# Patient Record
Sex: MALE | Race: WHITE | URBAN - METROPOLITAN AREA
[De-identification: names, ages, dates, MRNs, and addresses within clinical notes are randomized per-mention and may not be internally consistent; named-entity substitution may affect disease eponyms.]

---

## 2018-05-24 ENCOUNTER — APPOINTMENT (RX ONLY)
Dept: URBAN - METROPOLITAN AREA CLINIC 97 | Facility: CLINIC | Age: 83
Setting detail: DERMATOLOGY
End: 2018-05-24

## 2018-05-24 DIAGNOSIS — L85.3 XEROSIS CUTIS: ICD-10-CM

## 2018-05-24 DIAGNOSIS — L81.4 OTHER MELANIN HYPERPIGMENTATION: ICD-10-CM

## 2018-05-24 DIAGNOSIS — D485 NEOPLASM OF UNCERTAIN BEHAVIOR OF SKIN: ICD-10-CM

## 2018-05-24 DIAGNOSIS — L82.1 OTHER SEBORRHEIC KERATOSIS: ICD-10-CM

## 2018-05-24 DIAGNOSIS — L57.0 ACTINIC KERATOSIS: ICD-10-CM

## 2018-05-24 PROBLEM — D48.5 NEOPLASM OF UNCERTAIN BEHAVIOR OF SKIN: Status: ACTIVE | Noted: 2018-05-24

## 2018-05-24 PROCEDURE — 11101: CPT

## 2018-05-24 PROCEDURE — ? COUNSELING

## 2018-05-24 PROCEDURE — ? BIOPSY BY SHAVE METHOD

## 2018-05-24 PROCEDURE — 17003 DESTRUCT PREMALG LES 2-14: CPT

## 2018-05-24 PROCEDURE — 11100: CPT | Mod: 59

## 2018-05-24 PROCEDURE — ? TREATMENT REGIMEN

## 2018-05-24 PROCEDURE — 99212 OFFICE O/P EST SF 10 MIN: CPT | Mod: 25

## 2018-05-24 PROCEDURE — ? LIQUID NITROGEN

## 2018-05-24 PROCEDURE — 17000 DESTRUCT PREMALG LESION: CPT

## 2018-05-24 ASSESSMENT — LOCATION DETAILED DESCRIPTION DERM
LOCATION DETAILED: RIGHT DISTAL PRETIBIAL REGION
LOCATION DETAILED: LEFT ANTERIOR PROXIMAL UPPER ARM
LOCATION DETAILED: LEFT PROXIMAL DORSAL FOREARM
LOCATION DETAILED: RIGHT PROXIMAL DORSAL FOREARM
LOCATION DETAILED: RIGHT MEDIAL FRONTAL SCALP
LOCATION DETAILED: EPIGASTRIC SKIN
LOCATION DETAILED: INFERIOR THORACIC SPINE
LOCATION DETAILED: RIGHT ANTERIOR SHOULDER
LOCATION DETAILED: RIGHT SUPERIOR MEDIAL FOREHEAD
LOCATION DETAILED: SUPERIOR THORACIC SPINE
LOCATION DETAILED: LEFT PROXIMAL PRETIBIAL REGION
LOCATION DETAILED: LEFT ANTERIOR DISTAL THIGH
LOCATION DETAILED: RIGHT SUPERIOR UPPER BACK
LOCATION DETAILED: RIGHT ANTERIOR DISTAL THIGH
LOCATION DETAILED: LEFT SUPERIOR PARIETAL SCALP
LOCATION DETAILED: LEFT SUPERIOR FOREHEAD

## 2018-05-24 ASSESSMENT — LOCATION SIMPLE DESCRIPTION DERM
LOCATION SIMPLE: LEFT UPPER ARM
LOCATION SIMPLE: RIGHT FOREHEAD
LOCATION SIMPLE: LEFT THIGH
LOCATION SIMPLE: RIGHT FOREARM
LOCATION SIMPLE: RIGHT THIGH
LOCATION SIMPLE: RIGHT SCALP
LOCATION SIMPLE: ABDOMEN
LOCATION SIMPLE: SCALP
LOCATION SIMPLE: LEFT FOREARM
LOCATION SIMPLE: RIGHT UPPER BACK
LOCATION SIMPLE: LEFT PRETIBIAL REGION
LOCATION SIMPLE: RIGHT PRETIBIAL REGION
LOCATION SIMPLE: RIGHT SHOULDER
LOCATION SIMPLE: LEFT FOREHEAD
LOCATION SIMPLE: UPPER BACK

## 2018-05-24 ASSESSMENT — LOCATION ZONE DERM
LOCATION ZONE: ARM
LOCATION ZONE: SCALP
LOCATION ZONE: FACE
LOCATION ZONE: LEG
LOCATION ZONE: TRUNK

## 2018-05-24 NOTE — PROCEDURE: BIOPSY BY SHAVE METHOD
Size Of Lesion In Cm: 0
Anesthesia Volume In Cc (Will Not Render If 0): 0.5
Destruction After The Procedure: No
Electrodesiccation Text: The wound bed was treated with electrodesiccation after the biopsy was performed.
Consent: Written consent was obtained and risks were reviewed including but not limited to scarring, infection, bleeding, scabbing, incomplete removal, nerve damage and allergy to anesthesia.
Anesthesia Type: 1% lidocaine with epinephrine
Detail Level: Simple
Hemostasis: Zee's
Lab: [facility]
Wound Care: Petrolatum
Dressing: bandage
Was A Bandage Applied: Yes
Type Of Destruction Used: Curettage
Biopsy Type: H and E
Biopsy Method: sterile single edge surgical blade
Body Location Override (Optional - Billing Will Still Be Based On Selected Body Map Location If Applicable): Mid Back
Electrodesiccation And Curettage Text: The wound bed was treated with electrodesiccation and curettage after the biopsy was performed.
Post-Care Instructions: I reviewed with the patient in detail post-care instructions. Patient is to keep the biopsy site dry overnight, and then apply bacitracin twice daily until healed. Patient may apply hydrogen peroxide soaks to remove any crusting.
Lab Facility: [id number] [facility]
Notification Instructions: Patient will be notified of biopsy results. However, patient instructed to call the office if not contacted within 2 weeks.
Silver Nitrate Text: The wound bed was treated with silver nitrate after the biopsy was performed.
Billing Type: [employer]
Billing Type: Third-Party Bill
Lab Facility: [id number]
Lab: [id number]
Body Location Override (Optional - Billing Will Still Be Based On Selected Body Map Location If Applicable): Right Shoulder

## 2018-07-13 ENCOUNTER — EMERGENCY (EMERGENCY)
Facility: HOSPITAL | Age: 83
LOS: 0 days | Discharge: HOME | End: 2018-07-13
Attending: EMERGENCY MEDICINE | Admitting: EMERGENCY MEDICINE

## 2018-07-13 VITALS
DIASTOLIC BLOOD PRESSURE: 68 MMHG | OXYGEN SATURATION: 99 % | RESPIRATION RATE: 18 BRPM | HEART RATE: 65 BPM | SYSTOLIC BLOOD PRESSURE: 171 MMHG | TEMPERATURE: 96 F

## 2018-07-13 VITALS
RESPIRATION RATE: 17 BRPM | OXYGEN SATURATION: 98 % | HEART RATE: 58 BPM | DIASTOLIC BLOOD PRESSURE: 85 MMHG | SYSTOLIC BLOOD PRESSURE: 160 MMHG

## 2018-07-13 DIAGNOSIS — R20.2 PARESTHESIA OF SKIN: ICD-10-CM

## 2018-07-13 DIAGNOSIS — E78.5 HYPERLIPIDEMIA, UNSPECIFIED: ICD-10-CM

## 2018-07-13 DIAGNOSIS — I10 ESSENTIAL (PRIMARY) HYPERTENSION: ICD-10-CM

## 2018-07-13 LAB
ALBUMIN SERPL ELPH-MCNC: 4.1 G/DL — SIGNIFICANT CHANGE UP (ref 3.5–5.2)
ALP SERPL-CCNC: 87 U/L — SIGNIFICANT CHANGE UP (ref 30–115)
ALT FLD-CCNC: 8 U/L — SIGNIFICANT CHANGE UP (ref 0–41)
ANION GAP SERPL CALC-SCNC: 11 MMOL/L — SIGNIFICANT CHANGE UP (ref 7–14)
APTT BLD: 30.1 SEC — SIGNIFICANT CHANGE UP (ref 27–39.2)
AST SERPL-CCNC: 12 U/L — SIGNIFICANT CHANGE UP (ref 0–41)
BASOPHILS # BLD AUTO: 0.09 K/UL — SIGNIFICANT CHANGE UP (ref 0–0.2)
BASOPHILS NFR BLD AUTO: 1 % — SIGNIFICANT CHANGE UP (ref 0–1)
BILIRUB SERPL-MCNC: 0.5 MG/DL — SIGNIFICANT CHANGE UP (ref 0.2–1.2)
BUN SERPL-MCNC: 15 MG/DL — SIGNIFICANT CHANGE UP (ref 10–20)
CALCIUM SERPL-MCNC: 9 MG/DL — SIGNIFICANT CHANGE UP (ref 8.5–10.1)
CHLORIDE SERPL-SCNC: 102 MMOL/L — SIGNIFICANT CHANGE UP (ref 98–110)
CK MB CFR SERPL CALC: 2.1 NG/ML — SIGNIFICANT CHANGE UP (ref 0.6–6.3)
CK SERPL-CCNC: 76 U/L — SIGNIFICANT CHANGE UP (ref 0–225)
CO2 SERPL-SCNC: 28 MMOL/L — SIGNIFICANT CHANGE UP (ref 17–32)
CREAT SERPL-MCNC: 0.8 MG/DL — SIGNIFICANT CHANGE UP (ref 0.7–1.5)
EOSINOPHIL # BLD AUTO: 0.47 K/UL — SIGNIFICANT CHANGE UP (ref 0–0.7)
EOSINOPHIL NFR BLD AUTO: 5.4 % — SIGNIFICANT CHANGE UP (ref 0–8)
GLUCOSE SERPL-MCNC: 93 MG/DL — SIGNIFICANT CHANGE UP (ref 70–99)
HCT VFR BLD CALC: 40 % — LOW (ref 42–52)
HGB BLD-MCNC: 13.6 G/DL — LOW (ref 14–18)
IMM GRANULOCYTES NFR BLD AUTO: 0.2 % — SIGNIFICANT CHANGE UP (ref 0.1–0.3)
INR BLD: 1.1 RATIO — SIGNIFICANT CHANGE UP (ref 0.65–1.3)
LYMPHOCYTES # BLD AUTO: 1.76 K/UL — SIGNIFICANT CHANGE UP (ref 1.2–3.4)
LYMPHOCYTES # BLD AUTO: 20.2 % — LOW (ref 20.5–51.1)
MCHC RBC-ENTMCNC: 30.2 PG — SIGNIFICANT CHANGE UP (ref 27–31)
MCHC RBC-ENTMCNC: 34 G/DL — SIGNIFICANT CHANGE UP (ref 32–37)
MCV RBC AUTO: 88.7 FL — SIGNIFICANT CHANGE UP (ref 80–94)
MONOCYTES # BLD AUTO: 1.05 K/UL — HIGH (ref 0.1–0.6)
MONOCYTES NFR BLD AUTO: 12.1 % — HIGH (ref 1.7–9.3)
NEUTROPHILS # BLD AUTO: 5.31 K/UL — SIGNIFICANT CHANGE UP (ref 1.4–6.5)
NEUTROPHILS NFR BLD AUTO: 61.1 % — SIGNIFICANT CHANGE UP (ref 42.2–75.2)
PLATELET # BLD AUTO: 207 K/UL — SIGNIFICANT CHANGE UP (ref 130–400)
POTASSIUM SERPL-MCNC: 4.3 MMOL/L — SIGNIFICANT CHANGE UP (ref 3.5–5)
POTASSIUM SERPL-SCNC: 4.3 MMOL/L — SIGNIFICANT CHANGE UP (ref 3.5–5)
PROT SERPL-MCNC: 6.7 G/DL — SIGNIFICANT CHANGE UP (ref 6–8)
PROTHROM AB SERPL-ACNC: 11.9 SEC — SIGNIFICANT CHANGE UP (ref 9.95–12.87)
RBC # BLD: 4.51 M/UL — LOW (ref 4.7–6.1)
RBC # FLD: 14.4 % — SIGNIFICANT CHANGE UP (ref 11.5–14.5)
SODIUM SERPL-SCNC: 141 MMOL/L — SIGNIFICANT CHANGE UP (ref 135–146)
TROPONIN T SERPL-MCNC: <0.01 NG/ML — SIGNIFICANT CHANGE UP
WBC # BLD: 8.7 K/UL — SIGNIFICANT CHANGE UP (ref 4.8–10.8)
WBC # FLD AUTO: 8.7 K/UL — SIGNIFICANT CHANGE UP (ref 4.8–10.8)

## 2018-07-13 NOTE — ED PROVIDER NOTE - PROGRESS NOTE DETAILS
ATTENDING NOTE:   84 y/o M had outpatient XR showing  questionable fracture of cervical spine so sent to ED for CT scan. Here pt notes +associated tingling sensation of LUE. Denies trauma.  No HA, neck pain, CP, SOB, abdominal pain, rash.   AVSS; exam as noted. (+)TTP to cervical spine. L arm tingling reproduced with axial load. CTAB. RRR. Abdomen soft NTND, (+) bowel sounds. Neuro nonfocal. Labs, imaging reviewed. Will discharge.

## 2018-07-13 NOTE — ED ADULT TRIAGE NOTE - CHIEF COMPLAINT QUOTE
Pt went to urgent care for eval of left arm tingling x 1 month, was found to have C-spine FX, referral for ortho Pt went to urgent care for eval of left arm tingling x 1 month, was found to have C-spine FX, referral for ortho & sent for "abnormal EKG" pt denies CP

## 2018-07-13 NOTE — ED ADULT NURSE NOTE - CHIEF COMPLAINT QUOTE
Pt went to urgent care for eval of left arm tingling x 1 month, was found to have C-spine FX, referral for ortho & sent for "abnormal EKG" pt denies CP

## 2018-07-13 NOTE — ED PROVIDER NOTE - NS ED ROS FT
Constitutional: No fever, chills.  Eyes: No visual changes.  ENT: No hearing changes. No sore throat.  Neck: No neck pain or stiffness.  Cardiovascular: No chest pain, palpitations, edema.  Pulmonary: No SOB, cough. No hemoptysis.  Abdominal: No abdominal pain, nausea, vomiting, diarrhea.  : No dysuria, frequency.  Neuro: No headache, syncope, dizziness. + tingling to left forearm.  MS: No back pain. No calf pain/swelling.  Psych: No suicidal ideations.

## 2018-07-13 NOTE — ED PROVIDER NOTE - OBJECTIVE STATEMENT
Pt is an 82 y/o male with hx of HTN, DLD, who presents to ED for tingling to left forearm. PT states has been having intermittent tingling for 4 weeks, non exertional. No chest pain, SOB, diaphoresis, n/v. PT was seen at urgent care last night, had EKg which was abnormal and Cspine XR which showed possible fx so was sent here for eval. No neck pain. Pt had negative stress test last year.

## 2018-07-13 NOTE — ED ADULT NURSE NOTE - NS PRO PASSIVE SMOKE EXP
Called the number provided pt not available left msg with Latisha Anna/Wife to have pt call Dr Ramirez's office. Voiced understanding   No

## 2018-07-13 NOTE — ED PROVIDER NOTE - PHYSICAL EXAMINATION
Constitutional: Well developed, well nourished. NAD.  Head: Normocephalic, atraumatic.  Eyes: PERRL. EOMI.  ENT: No nasal discharge. Mucous membranes moist.  Neck: Supple. Painless ROM. No midline tenderness. + Spurlings.  Cardiovascular: Normal S1, S2. Regular rate and rhythm. No murmurs, rubs, or gallops.  Pulmonary: Normal respiratory rate and effort. Lungs clear to auscultation bilaterally. No wheezing, rales, or rhonchi.  Abdominal: Soft. Nondistended. Nontender. No rebound, guarding, rigidity.  Extremities. Pelvis stable. No lower extremity edema, symmetric calves.  Skin: No rashes, cyanosis.  Neuro: AAOx3. No focal neurological deficits.  Psych: Normal mood. Normal affect.

## 2018-10-23 PROBLEM — E78.00 PURE HYPERCHOLESTEROLEMIA, UNSPECIFIED: Chronic | Status: ACTIVE | Noted: 2018-07-13

## 2018-10-23 PROBLEM — I10 ESSENTIAL (PRIMARY) HYPERTENSION: Chronic | Status: ACTIVE | Noted: 2018-07-13

## 2018-10-29 ENCOUNTER — OUTPATIENT (OUTPATIENT)
Dept: OUTPATIENT SERVICES | Facility: HOSPITAL | Age: 83
LOS: 1 days | Discharge: HOME | End: 2018-10-29

## 2018-10-29 DIAGNOSIS — R25.1 TREMOR, UNSPECIFIED: ICD-10-CM

## 2018-10-29 DIAGNOSIS — R41.3 OTHER AMNESIA: ICD-10-CM

## 2018-11-02 ENCOUNTER — OUTPATIENT (OUTPATIENT)
Dept: OUTPATIENT SERVICES | Facility: HOSPITAL | Age: 83
LOS: 1 days | Discharge: HOME | End: 2018-11-02

## 2018-11-02 DIAGNOSIS — R25.1 TREMOR, UNSPECIFIED: ICD-10-CM

## 2018-11-02 DIAGNOSIS — G20 PARKINSON'S DISEASE: ICD-10-CM

## 2018-11-19 ENCOUNTER — EMERGENCY (EMERGENCY)
Facility: HOSPITAL | Age: 83
LOS: 0 days | Discharge: HOME | End: 2018-11-19
Attending: EMERGENCY MEDICINE | Admitting: EMERGENCY MEDICINE

## 2018-11-19 VITALS
TEMPERATURE: 98 F | SYSTOLIC BLOOD PRESSURE: 158 MMHG | DIASTOLIC BLOOD PRESSURE: 70 MMHG | OXYGEN SATURATION: 99 % | RESPIRATION RATE: 18 BRPM | HEART RATE: 71 BPM

## 2018-11-19 VITALS
RESPIRATION RATE: 18 BRPM | DIASTOLIC BLOOD PRESSURE: 80 MMHG | SYSTOLIC BLOOD PRESSURE: 145 MMHG | OXYGEN SATURATION: 100 % | HEART RATE: 71 BPM | TEMPERATURE: 98 F

## 2018-11-19 DIAGNOSIS — R11.0 NAUSEA: ICD-10-CM

## 2018-11-19 DIAGNOSIS — R06.2 WHEEZING: ICD-10-CM

## 2018-11-19 DIAGNOSIS — R05 COUGH: ICD-10-CM

## 2018-11-19 DIAGNOSIS — R07.89 OTHER CHEST PAIN: ICD-10-CM

## 2018-11-19 DIAGNOSIS — R09.81 NASAL CONGESTION: ICD-10-CM

## 2018-11-19 DIAGNOSIS — R06.02 SHORTNESS OF BREATH: ICD-10-CM

## 2018-11-19 LAB
ALBUMIN SERPL ELPH-MCNC: 4.5 G/DL — SIGNIFICANT CHANGE UP (ref 3.5–5.2)
ALP SERPL-CCNC: 86 U/L — SIGNIFICANT CHANGE UP (ref 30–115)
ALT FLD-CCNC: 15 U/L — SIGNIFICANT CHANGE UP (ref 0–41)
ANION GAP SERPL CALC-SCNC: 16 MMOL/L — HIGH (ref 7–14)
AST SERPL-CCNC: 14 U/L — SIGNIFICANT CHANGE UP (ref 0–41)
BILIRUB SERPL-MCNC: 0.5 MG/DL — SIGNIFICANT CHANGE UP (ref 0.2–1.2)
BUN SERPL-MCNC: 25 MG/DL — HIGH (ref 10–20)
CALCIUM SERPL-MCNC: 9.7 MG/DL — SIGNIFICANT CHANGE UP (ref 8.5–10.1)
CHLORIDE SERPL-SCNC: 101 MMOL/L — SIGNIFICANT CHANGE UP (ref 98–110)
CO2 SERPL-SCNC: 26 MMOL/L — SIGNIFICANT CHANGE UP (ref 17–32)
CREAT SERPL-MCNC: 0.9 MG/DL — SIGNIFICANT CHANGE UP (ref 0.7–1.5)
GAS PNL BLDV: SIGNIFICANT CHANGE UP
GLUCOSE SERPL-MCNC: 117 MG/DL — HIGH (ref 70–99)
HCT VFR BLD CALC: 41.9 % — LOW (ref 42–52)
HGB BLD-MCNC: 14 G/DL — SIGNIFICANT CHANGE UP (ref 14–18)
LIDOCAIN IGE QN: 21 U/L — SIGNIFICANT CHANGE UP (ref 7–60)
MAGNESIUM SERPL-MCNC: 1.8 MG/DL — SIGNIFICANT CHANGE UP (ref 1.8–2.4)
MCHC RBC-ENTMCNC: 30.4 PG — SIGNIFICANT CHANGE UP (ref 27–31)
MCHC RBC-ENTMCNC: 33.4 G/DL — SIGNIFICANT CHANGE UP (ref 32–37)
MCV RBC AUTO: 90.9 FL — SIGNIFICANT CHANGE UP (ref 80–94)
NRBC # BLD: 0 /100 WBCS — SIGNIFICANT CHANGE UP (ref 0–0)
NT-PROBNP SERPL-SCNC: 135 PG/ML — SIGNIFICANT CHANGE UP (ref 0–300)
PLATELET # BLD AUTO: 270 K/UL — SIGNIFICANT CHANGE UP (ref 130–400)
POTASSIUM SERPL-MCNC: 4.8 MMOL/L — SIGNIFICANT CHANGE UP (ref 3.5–5)
POTASSIUM SERPL-SCNC: 4.8 MMOL/L — SIGNIFICANT CHANGE UP (ref 3.5–5)
PROT SERPL-MCNC: 7.1 G/DL — SIGNIFICANT CHANGE UP (ref 6–8)
RBC # BLD: 4.61 M/UL — LOW (ref 4.7–6.1)
RBC # FLD: 14.4 % — SIGNIFICANT CHANGE UP (ref 11.5–14.5)
SODIUM SERPL-SCNC: 143 MMOL/L — SIGNIFICANT CHANGE UP (ref 135–146)
TROPONIN T SERPL-MCNC: <0.01 NG/ML — SIGNIFICANT CHANGE UP
WBC # BLD: 15.75 K/UL — HIGH (ref 4.8–10.8)
WBC # FLD AUTO: 15.75 K/UL — HIGH (ref 4.8–10.8)

## 2018-11-19 NOTE — ED PROVIDER NOTE - NS_ ATTENDINGSCRIBEDETAILS _ED_A_ED_FT
I personally performed the services described in the documentation  recorded by the scribe in my presence, and it accurately and completely records my words and action

## 2018-11-19 NOTE — ED PROVIDER NOTE - OBJECTIVE STATEMENT
82 y/o M pmh HTN, HLD, parkinson's p/w burning, non radiating, mid chest pain that started last night, associated w/ SOB and nausea. Pt states that he has had cough, wheezing and congestion for past two weeks, recently saw Dr Huber and was diagnosed w/ URI, given prednisone with improvement in cough. Pt denies calf pain, admits to some b/l LE edema.

## 2018-11-19 NOTE — ED PROVIDER NOTE - ATTENDING CONTRIBUTION TO CARE
82 y/o M with no significant PMHx here c/o burning sensation in esophagus, occurring last night while pt was asleep into this AM. Pt states the burning sensation is intermittent. and feels like it is going up and down his throat. Pt denies SOB, abdominal pain, or neck pain. He was recently tx with prednisone for bronchitis for the past 2 weeks. he has no exertional symptoms.  PE: heart RRR, lungs CTAB, abdomen soft, NTND, FROM all ext. IMP: likely GI related given burning occurring at night without exertional SX. SX are intermittent and pt had normal stress test x8 months ago. Plan: one set of enzymes, EKG and treat for GI related acid reflux.

## 2018-11-19 NOTE — ED PROVIDER NOTE - MEDICAL DECISION MAKING DETAILS
evaluated for cough and burning chest pain. given his sytmposm appears to be more gi related and unliekly cardiac. we did perform 1 set of enyzmes hwoever seamus refused to wait for full resutls. I discussed that these results are essential to evaluate his sytmpoms and without it I can not make a determination of his sytmpoms. he undrstands this limitations and he will leave ama. I attempted to call lab to speed results but it still did not return in time, he is of sound mind, aox3 and able to comprehend his decisions of leaving without all the blood results.

## 2018-11-19 NOTE — ED ADULT NURSE NOTE - NSIMPLEMENTINTERV_GEN_ALL_ED
Implemented All Universal Safety Interventions:  Lee to call system. Call bell, personal items and telephone within reach. Instruct patient to call for assistance. Room bathroom lighting operational. Non-slip footwear when patient is off stretcher. Physically safe environment: no spills, clutter or unnecessary equipment. Stretcher in lowest position, wheels locked, appropriate side rails in place.

## 2018-11-19 NOTE — ED PROVIDER NOTE - PHYSICAL EXAMINATION
General: AOx4, Non toxic appearing, NAD, speaking in full sentences.   Skin: Warm and dry, no acute rash.   Head:  Normocephalic, atraumatic.   EENT: PERRLA/EOMI, conjunctiva and sclera clear. MM moist, no nasal discharge.  Pharynx and TM's unremarkable.  No mastoid or temporal ttp.   Neck: Supple nt, no meningeal signs.   Heart RRR s1s2 nl, no rub/murmur.   Lungs- No retractions, BS equal, CTAB.   Abdomen: Soft ntnd no r/g.   Extremities- moves all, +equal distal pulses, brisk cap refill. +1 LE edema, calves nttp b/l.  Neuro: Sensation wnl, CN 2-12 grossly intact. +5/5 muscle strength throughout.  Psych: Cooperative, appropriate

## 2018-11-19 NOTE — ED PROVIDER NOTE - NS ED ROS FT
Constitutional: NAD  Eyes: No visual changes, eye pain or discharge.  ENMT:+URI sx. No neck pain or stiffness.  Cardiac: +chest pain, SOB, edema. No chest pain with exertion.  Respiratory: +cough, SOB  GI: + nausea. No vomiting, diarrhea or abdominal pain.  : No dysuria, frequency or burning.  MS: No myalgia, muscle weakness, joint pain or back pain.  Neuro: No headache or weakness. No LOC.  Skin: No skin rash.  Heme: No bruising

## 2018-11-19 NOTE — ED ADULT NURSE NOTE - CHPI ED NUR SYMPTOMS NEG
no chills/no decreased eating/drinking/no fever/no nausea/no pain/no vomiting/no dizziness/no tingling/no weakness

## 2019-03-14 ENCOUNTER — APPOINTMENT (OUTPATIENT)
Dept: SURGERY | Facility: CLINIC | Age: 84
End: 2019-03-14
Payer: MEDICARE

## 2019-03-14 VITALS
BODY MASS INDEX: 23.46 KG/M2 | HEIGHT: 66 IN | WEIGHT: 146 LBS | DIASTOLIC BLOOD PRESSURE: 78 MMHG | SYSTOLIC BLOOD PRESSURE: 118 MMHG

## 2019-03-14 DIAGNOSIS — R15.9 FULL INCONTINENCE OF FECES: ICD-10-CM

## 2019-03-14 DIAGNOSIS — F17.200 NICOTINE DEPENDENCE, UNSPECIFIED, UNCOMPLICATED: ICD-10-CM

## 2019-03-14 DIAGNOSIS — Z78.9 OTHER SPECIFIED HEALTH STATUS: ICD-10-CM

## 2019-03-14 PROCEDURE — 99203 OFFICE O/P NEW LOW 30 MIN: CPT | Mod: 25

## 2019-03-14 PROCEDURE — 46600 DIAGNOSTIC ANOSCOPY SPX: CPT

## 2019-03-14 NOTE — HISTORY OF PRESENT ILLNESS
[FreeTextEntry1] : This is a new patient visit for Mr. POPE who is referred by Dr. Arechiga with anal incontinence. Mr. Pope states that he had a hemorrhoidectomy approximately a 10 years ago and since that time he's had difficulty controlling his bowel movements. He states that he has occasional accidents. He has not noted that this is become now worse over time but that it has stayed constant.

## 2019-03-14 NOTE — PROCEDURE
[FreeTextEntry1] : Anoscopy performed using a disposable anoscope.  The patient was place in the left lateral decubitus position. After LIGIA was performed the anoscope was inserted and the distal rectum was evaluated along with the anal canal and anal margin.\par \par Findings:\par Perianal tissues are without significant erythema induration, mild fecal soiling. moderate perianal skin tags.\par \par LIGIA, reasonable tone, reasonable squeeze, no palpable mass.\par \par Anoscopy, grade 2-3 internal hemorrhoids.

## 2019-03-14 NOTE — ASSESSMENT
[FreeTextEntry1] : Mr. Pinon has complained of anal incontinence with fecal soilage. We will get him scheduled for anorectal manometry. The procedure was described to him in detail and informed consent was obtained. The procedure will be scheduled for him at his earliest convenience.

## 2019-03-14 NOTE — PHYSICAL EXAM
[Excoriation] : excoriations [Reduce Spontaneously] : a spontaneously reducible (grade II) [Skin Tags] : residual hemorrhoidal skin tags were noted [Normal] : was normal [None] : there was no rectal mass  [Alert] : alert [Oriented to Person] : oriented to person [Oriented to Place] : oriented to place [Oriented to Time] : oriented to time [Calm] : calm [Multiple Sinus Tracts] : no perianal sinus tracts [Fistula] : no fistulas [Wart] : no warts [Ulcer ___ cm] : no ulcers [Pilonidal Cyst] : no pilonidal cysts [Pilonidal Sinus] : no pilonidal sinus [Pilonidal Sinus Draining] : no pilonidal sinus drainage [de-identified] : Healthy male, NAD [de-identified] : Full range of motion [de-identified] : No focal deficits.

## 2019-03-14 NOTE — CONSULT LETTER
[Dear  ___] : Dear  [unfilled], [Consult Letter:] : I had the pleasure of evaluating your patient, [unfilled]. [Please see my note below.] : Please see my note below. [Consult Closing:] : Thank you very much for allowing me to participate in the care of this patient.  If you have any questions, please do not hesitate to contact me. [Sincerely,] : Sincerely, [FreeTextEntry1] : Mr. Pinon has complained of anal incontinence with fecal soilage. We will get him scheduled for anorectal manometry. The procedure was described to him in detail and informed consent was obtained. The procedure will be scheduled for him at his earliest convenience. [FreeTextEntry3] : Steve Bland MD.\par Colon and Rectal Surgery\par [facility]\par [facility]\par [facility], 3rd floor\par [address] [place] [address]\par [place], [place], [address]\par [phone]\par \par \par

## 2019-03-14 NOTE — PHYSICAL EXAM
[Excoriation] : excoriations [Reduce Spontaneously] : a spontaneously reducible (grade II) [Skin Tags] : residual hemorrhoidal skin tags were noted [Normal] : was normal [None] : there was no rectal mass  [Alert] : alert [Oriented to Person] : oriented to person [Oriented to Place] : oriented to place [Oriented to Time] : oriented to time [Calm] : calm [Multiple Sinus Tracts] : no perianal sinus tracts [Fistula] : no fistulas [Wart] : no warts [Ulcer ___ cm] : no ulcers [Pilonidal Cyst] : no pilonidal cysts [Pilonidal Sinus] : no pilonidal sinus [Pilonidal Sinus Draining] : no pilonidal sinus drainage [de-identified] : Healthy male, NAD [de-identified] : Full range of motion [de-identified] : No focal deficits.

## 2019-03-14 NOTE — CONSULT LETTER
[Dear  ___] : Dear  [unfilled], [Consult Letter:] : I had the pleasure of evaluating your patient, [unfilled]. [Please see my note below.] : Please see my note below. [Consult Closing:] : Thank you very much for allowing me to participate in the care of this patient.  If you have any questions, please do not hesitate to contact me. [Sincerely,] : Sincerely, [FreeTextEntry1] : Mr. Pinon has complained of anal incontinence with fecal soilage. We will get him scheduled for anorectal manometry. The procedure was described to him in detail and informed consent was obtained. The procedure will be scheduled for him at his earliest convenience. [FreeTextEntry3] : Steve Bland MD.\par Colon and Rectal Surgery\par [facility]\par [facility]\par [facility], 3rd floor\par [address]\par [place], [place], [address]\par [phone]\par \par \par

## 2019-03-19 ENCOUNTER — MESSAGE (OUTPATIENT)
Age: 84
End: 2019-03-19

## 2019-03-22 ENCOUNTER — MESSAGE (OUTPATIENT)
Age: 84
End: 2019-03-22

## 2019-05-13 ENCOUNTER — INPATIENT (INPATIENT)
Facility: HOSPITAL | Age: 84
LOS: 1 days | Discharge: NURSING, REHAB OR LONG-TERM CARE | End: 2019-05-15
Attending: INTERNAL MEDICINE | Admitting: INTERNAL MEDICINE
Payer: MEDICARE

## 2019-05-13 VITALS
RESPIRATION RATE: 17 BRPM | OXYGEN SATURATION: 99 % | DIASTOLIC BLOOD PRESSURE: 88 MMHG | SYSTOLIC BLOOD PRESSURE: 203 MMHG | HEART RATE: 75 BPM | TEMPERATURE: 97 F

## 2019-05-13 LAB
ALBUMIN SERPL ELPH-MCNC: 4.3 G/DL — SIGNIFICANT CHANGE UP (ref 3.5–5.2)
ALP SERPL-CCNC: 102 U/L — SIGNIFICANT CHANGE UP (ref 30–115)
ALT FLD-CCNC: 24 U/L — SIGNIFICANT CHANGE UP (ref 0–41)
ANION GAP SERPL CALC-SCNC: 13 MMOL/L — SIGNIFICANT CHANGE UP (ref 7–14)
AST SERPL-CCNC: 14 U/L — SIGNIFICANT CHANGE UP (ref 0–41)
BASOPHILS # BLD AUTO: 0.1 K/UL — SIGNIFICANT CHANGE UP (ref 0–0.2)
BASOPHILS NFR BLD AUTO: 0.8 % — SIGNIFICANT CHANGE UP (ref 0–1)
BILIRUB SERPL-MCNC: 0.3 MG/DL — SIGNIFICANT CHANGE UP (ref 0.2–1.2)
BUN SERPL-MCNC: 22 MG/DL — HIGH (ref 10–20)
CALCIUM SERPL-MCNC: 9.8 MG/DL — SIGNIFICANT CHANGE UP (ref 8.5–10.1)
CHLORIDE SERPL-SCNC: 107 MMOL/L — SIGNIFICANT CHANGE UP (ref 98–110)
CO2 SERPL-SCNC: 27 MMOL/L — SIGNIFICANT CHANGE UP (ref 17–32)
CREAT SERPL-MCNC: 1 MG/DL — SIGNIFICANT CHANGE UP (ref 0.7–1.5)
EOSINOPHIL # BLD AUTO: 0.53 K/UL — SIGNIFICANT CHANGE UP (ref 0–0.7)
EOSINOPHIL NFR BLD AUTO: 4.5 % — SIGNIFICANT CHANGE UP (ref 0–8)
GLUCOSE SERPL-MCNC: 85 MG/DL — SIGNIFICANT CHANGE UP (ref 70–99)
HCT VFR BLD CALC: 45.4 % — SIGNIFICANT CHANGE UP (ref 42–52)
HGB BLD-MCNC: 15 G/DL — SIGNIFICANT CHANGE UP (ref 14–18)
IMM GRANULOCYTES NFR BLD AUTO: 0.7 % — HIGH (ref 0.1–0.3)
LYMPHOCYTES # BLD AUTO: 1.46 K/UL — SIGNIFICANT CHANGE UP (ref 1.2–3.4)
LYMPHOCYTES # BLD AUTO: 12.3 % — LOW (ref 20.5–51.1)
MCHC RBC-ENTMCNC: 30.2 PG — SIGNIFICANT CHANGE UP (ref 27–31)
MCHC RBC-ENTMCNC: 33 G/DL — SIGNIFICANT CHANGE UP (ref 32–37)
MCV RBC AUTO: 91.5 FL — SIGNIFICANT CHANGE UP (ref 80–94)
MONOCYTES # BLD AUTO: 1.08 K/UL — HIGH (ref 0.1–0.6)
MONOCYTES NFR BLD AUTO: 9.1 % — SIGNIFICANT CHANGE UP (ref 1.7–9.3)
NEUTROPHILS # BLD AUTO: 8.65 K/UL — HIGH (ref 1.4–6.5)
NEUTROPHILS NFR BLD AUTO: 72.6 % — SIGNIFICANT CHANGE UP (ref 42.2–75.2)
NRBC # BLD: 0 /100 WBCS — SIGNIFICANT CHANGE UP (ref 0–0)
PLATELET # BLD AUTO: 241 K/UL — SIGNIFICANT CHANGE UP (ref 130–400)
POTASSIUM SERPL-MCNC: 4.5 MMOL/L — SIGNIFICANT CHANGE UP (ref 3.5–5)
POTASSIUM SERPL-SCNC: 4.5 MMOL/L — SIGNIFICANT CHANGE UP (ref 3.5–5)
PROT SERPL-MCNC: 6.7 G/DL — SIGNIFICANT CHANGE UP (ref 6–8)
RBC # BLD: 4.96 M/UL — SIGNIFICANT CHANGE UP (ref 4.7–6.1)
RBC # FLD: 13.9 % — SIGNIFICANT CHANGE UP (ref 11.5–14.5)
SODIUM SERPL-SCNC: 147 MMOL/L — HIGH (ref 135–146)
WBC # BLD: 11.9 K/UL — HIGH (ref 4.8–10.8)
WBC # FLD AUTO: 11.9 K/UL — HIGH (ref 4.8–10.8)

## 2019-05-13 PROCEDURE — 99285 EMERGENCY DEPT VISIT HI MDM: CPT | Mod: GC

## 2019-05-13 PROCEDURE — 73552 X-RAY EXAM OF FEMUR 2/>: CPT | Mod: 26,LT

## 2019-05-13 PROCEDURE — 73110 X-RAY EXAM OF WRIST: CPT | Mod: 26,LT

## 2019-05-13 PROCEDURE — 71045 X-RAY EXAM CHEST 1 VIEW: CPT | Mod: 26

## 2019-05-13 PROCEDURE — 73562 X-RAY EXAM OF KNEE 3: CPT | Mod: 26,LT

## 2019-05-13 PROCEDURE — 72192 CT PELVIS W/O DYE: CPT | Mod: 26

## 2019-05-13 PROCEDURE — 72170 X-RAY EXAM OF PELVIS: CPT | Mod: 26

## 2019-05-13 RX ORDER — LABETALOL HCL 100 MG
10 TABLET ORAL ONCE
Refills: 0 | Status: DISCONTINUED | OUTPATIENT
Start: 2019-05-13 | End: 2019-05-15

## 2019-05-13 RX ORDER — LISINOPRIL 2.5 MG/1
1 TABLET ORAL
Qty: 0 | Refills: 0 | DISCHARGE

## 2019-05-13 RX ORDER — ACETAMINOPHEN 500 MG
650 TABLET ORAL ONCE
Refills: 0 | Status: COMPLETED | OUTPATIENT
Start: 2019-05-13 | End: 2019-05-13

## 2019-05-13 RX ORDER — MORPHINE SULFATE 50 MG/1
2 CAPSULE, EXTENDED RELEASE ORAL EVERY 4 HOURS
Refills: 0 | Status: DISCONTINUED | OUTPATIENT
Start: 2019-05-13 | End: 2019-05-15

## 2019-05-13 RX ORDER — IBUPROFEN 200 MG
400 TABLET ORAL EVERY 6 HOURS
Refills: 0 | Status: DISCONTINUED | OUTPATIENT
Start: 2019-05-13 | End: 2019-05-15

## 2019-05-13 RX ORDER — SODIUM CHLORIDE 9 MG/ML
1000 INJECTION INTRAMUSCULAR; INTRAVENOUS; SUBCUTANEOUS
Refills: 0 | Status: DISCONTINUED | OUTPATIENT
Start: 2019-05-13 | End: 2019-05-14

## 2019-05-13 RX ADMIN — Medication 650 MILLIGRAM(S): at 19:24

## 2019-05-13 RX ADMIN — Medication 650 MILLIGRAM(S): at 17:06

## 2019-05-13 NOTE — ED ADULT NURSE REASSESSMENT NOTE - NS ED NURSE REASSESS COMMENT FT1
Report given to oncoming RN Taylor, pts history, current condition and reason for admission discussed, safety concerns addressed and reviewed, pt currently in stable condition, IV flushes for patency and site shows no signs or symptoms of infiltrate, dressing is clean dry and intact, pt is aware of plan of care. Pt education deemed successful at time of report after patient demonstrates successful teach back for proficiency.
Dr. Ordoñez made aware of pts BP and pain

## 2019-05-13 NOTE — ED PROVIDER NOTE - OBJECTIVE STATEMENT
84y/oi M w/ hx of htn and high cholesterol presents after mechanical fall to l. hip, no trauma to head no loc.  no cp or sob before or after fall.  pt was able to walk a few steps after fall, but then had to stop up because could bare weight to l.

## 2019-05-13 NOTE — ED PROVIDER NOTE - PHYSICAL EXAMINATION
VITAL SIGNS: I have reviewed nursing notes and confirm.  CONSTITUTIONAL: Well-developed; well-nourished; in no acute distress. pt comfortable.  SKIN: skin exam is warm and dry, no acute rash.   HEAD: Normocephalic; atraumatic.  EYES:  EOM intact; conjunctiva and sclera clear.  ENT: No nasal discharge; airway clear. moist oral mucosa;  NECK: Supple; non tender.  CARD: S1, S2 normal; no murmurs, gallops, or rubs. Regular rate and rhythm. posterior tibial and radial pulses 2+  RESP: No wheezes, rales or rhonchi. cta b/l. no use of accessory muscles. no retractions  ABD: Normal bowel sounds; soft; non-distended; non-tender; no rebound.   EXT: Normal ROM. No  cyanosis or edema.  BACK: No cva tenderness  LYMPH: No acute cervical adenopathy.  NEURO: Alert, oriented, grossly unremarkable.  +limping.  PSYCH: Cooperative, appropriate.

## 2019-05-13 NOTE — ED ADULT NURSE NOTE - OBJECTIVE STATEMENT
Pt is an 84YOM who is here after suffering from a fall, pt was ambulating at home on the street when he kicked an piece of the ground that was not level and fell down, ladning on his left hip and arm. Pt denies hitting his head, pt denies any LOC, pt denies any blood thinners but states he takes an 81mg ASA.

## 2019-05-14 LAB
ALBUMIN SERPL ELPH-MCNC: 3.9 G/DL — SIGNIFICANT CHANGE UP (ref 3.5–5.2)
ALP SERPL-CCNC: 85 U/L — SIGNIFICANT CHANGE UP (ref 30–115)
ALT FLD-CCNC: 16 U/L — SIGNIFICANT CHANGE UP (ref 0–41)
ANION GAP SERPL CALC-SCNC: 12 MMOL/L — SIGNIFICANT CHANGE UP (ref 7–14)
AST SERPL-CCNC: 10 U/L — SIGNIFICANT CHANGE UP (ref 0–41)
BASOPHILS # BLD AUTO: 0.1 K/UL — SIGNIFICANT CHANGE UP (ref 0–0.2)
BASOPHILS NFR BLD AUTO: 1.1 % — HIGH (ref 0–1)
BILIRUB SERPL-MCNC: 0.3 MG/DL — SIGNIFICANT CHANGE UP (ref 0.2–1.2)
BLD GP AB SCN SERPL QL: SIGNIFICANT CHANGE UP
BUN SERPL-MCNC: 15 MG/DL — SIGNIFICANT CHANGE UP (ref 10–20)
CALCIUM SERPL-MCNC: 9 MG/DL — SIGNIFICANT CHANGE UP (ref 8.5–10.1)
CHLORIDE SERPL-SCNC: 106 MMOL/L — SIGNIFICANT CHANGE UP (ref 98–110)
CK SERPL-CCNC: 41 U/L — SIGNIFICANT CHANGE UP (ref 0–225)
CO2 SERPL-SCNC: 25 MMOL/L — SIGNIFICANT CHANGE UP (ref 17–32)
CREAT SERPL-MCNC: 0.8 MG/DL — SIGNIFICANT CHANGE UP (ref 0.7–1.5)
EOSINOPHIL # BLD AUTO: 0.49 K/UL — SIGNIFICANT CHANGE UP (ref 0–0.7)
EOSINOPHIL NFR BLD AUTO: 5.5 % — SIGNIFICANT CHANGE UP (ref 0–8)
GLUCOSE SERPL-MCNC: 89 MG/DL — SIGNIFICANT CHANGE UP (ref 70–99)
HCT VFR BLD CALC: 41.6 % — LOW (ref 42–52)
HGB BLD-MCNC: 14 G/DL — SIGNIFICANT CHANGE UP (ref 14–18)
IMM GRANULOCYTES NFR BLD AUTO: 0.2 % — SIGNIFICANT CHANGE UP (ref 0.1–0.3)
LYMPHOCYTES # BLD AUTO: 1.23 K/UL — SIGNIFICANT CHANGE UP (ref 1.2–3.4)
LYMPHOCYTES # BLD AUTO: 13.9 % — LOW (ref 20.5–51.1)
MAGNESIUM SERPL-MCNC: 1.8 MG/DL — SIGNIFICANT CHANGE UP (ref 1.8–2.4)
MCHC RBC-ENTMCNC: 30.7 PG — SIGNIFICANT CHANGE UP (ref 27–31)
MCHC RBC-ENTMCNC: 33.7 G/DL — SIGNIFICANT CHANGE UP (ref 32–37)
MCV RBC AUTO: 91.2 FL — SIGNIFICANT CHANGE UP (ref 80–94)
MONOCYTES # BLD AUTO: 1.03 K/UL — HIGH (ref 0.1–0.6)
MONOCYTES NFR BLD AUTO: 11.6 % — HIGH (ref 1.7–9.3)
NEUTROPHILS # BLD AUTO: 6 K/UL — SIGNIFICANT CHANGE UP (ref 1.4–6.5)
NEUTROPHILS NFR BLD AUTO: 67.7 % — SIGNIFICANT CHANGE UP (ref 42.2–75.2)
NRBC # BLD: 0 /100 WBCS — SIGNIFICANT CHANGE UP (ref 0–0)
PLATELET # BLD AUTO: 213 K/UL — SIGNIFICANT CHANGE UP (ref 130–400)
POTASSIUM SERPL-MCNC: 3.7 MMOL/L — SIGNIFICANT CHANGE UP (ref 3.5–5)
POTASSIUM SERPL-SCNC: 3.7 MMOL/L — SIGNIFICANT CHANGE UP (ref 3.5–5)
PROT SERPL-MCNC: 6 G/DL — SIGNIFICANT CHANGE UP (ref 6–8)
RBC # BLD: 4.56 M/UL — LOW (ref 4.7–6.1)
RBC # FLD: 14 % — SIGNIFICANT CHANGE UP (ref 11.5–14.5)
SODIUM SERPL-SCNC: 143 MMOL/L — SIGNIFICANT CHANGE UP (ref 135–146)
TYPE + AB SCN PNL BLD: SIGNIFICANT CHANGE UP
WBC # BLD: 8.87 K/UL — SIGNIFICANT CHANGE UP (ref 4.8–10.8)
WBC # FLD AUTO: 8.87 K/UL — SIGNIFICANT CHANGE UP (ref 4.8–10.8)

## 2019-05-14 PROCEDURE — 93010 ELECTROCARDIOGRAM REPORT: CPT

## 2019-05-14 RX ORDER — ASPIRIN/CALCIUM CARB/MAGNESIUM 324 MG
81 TABLET ORAL DAILY
Refills: 0 | Status: DISCONTINUED | OUTPATIENT
Start: 2019-05-14 | End: 2019-05-15

## 2019-05-14 RX ORDER — BUPROPION HYDROCHLORIDE 150 MG/1
300 TABLET, EXTENDED RELEASE ORAL DAILY
Refills: 0 | Status: DISCONTINUED | OUTPATIENT
Start: 2019-05-14 | End: 2019-05-15

## 2019-05-14 RX ORDER — ATORVASTATIN CALCIUM 80 MG/1
80 TABLET, FILM COATED ORAL AT BEDTIME
Refills: 0 | Status: DISCONTINUED | OUTPATIENT
Start: 2019-05-14 | End: 2019-05-15

## 2019-05-14 RX ORDER — ENOXAPARIN SODIUM 100 MG/ML
40 INJECTION SUBCUTANEOUS DAILY
Refills: 0 | Status: DISCONTINUED | OUTPATIENT
Start: 2019-05-14 | End: 2019-05-15

## 2019-05-14 RX ORDER — DILTIAZEM HCL 120 MG
240 CAPSULE, EXT RELEASE 24 HR ORAL DAILY
Refills: 0 | Status: DISCONTINUED | OUTPATIENT
Start: 2019-05-14 | End: 2019-05-15

## 2019-05-14 RX ORDER — PROPRANOLOL HCL 160 MG
20 CAPSULE, EXTENDED RELEASE 24HR ORAL DAILY
Refills: 0 | Status: DISCONTINUED | OUTPATIENT
Start: 2019-05-14 | End: 2019-05-14

## 2019-05-14 RX ORDER — CHLORHEXIDINE GLUCONATE 213 G/1000ML
1 SOLUTION TOPICAL
Refills: 0 | Status: DISCONTINUED | OUTPATIENT
Start: 2019-05-14 | End: 2019-05-15

## 2019-05-14 RX ORDER — LISINOPRIL 2.5 MG/1
20 TABLET ORAL DAILY
Refills: 0 | Status: DISCONTINUED | OUTPATIENT
Start: 2019-05-14 | End: 2019-05-15

## 2019-05-14 RX ORDER — ENOXAPARIN SODIUM 100 MG/ML
30 INJECTION SUBCUTANEOUS DAILY
Refills: 0 | Status: DISCONTINUED | OUTPATIENT
Start: 2019-05-14 | End: 2019-05-14

## 2019-05-14 RX ADMIN — MORPHINE SULFATE 2 MILLIGRAM(S): 50 CAPSULE, EXTENDED RELEASE ORAL at 13:15

## 2019-05-14 RX ADMIN — ENOXAPARIN SODIUM 40 MILLIGRAM(S): 100 INJECTION SUBCUTANEOUS at 12:37

## 2019-05-14 RX ADMIN — Medication 400 MILLIGRAM(S): at 00:10

## 2019-05-14 RX ADMIN — ATORVASTATIN CALCIUM 80 MILLIGRAM(S): 80 TABLET, FILM COATED ORAL at 21:45

## 2019-05-14 RX ADMIN — Medication 81 MILLIGRAM(S): at 12:37

## 2019-05-14 RX ADMIN — Medication 240 MILLIGRAM(S): at 06:26

## 2019-05-14 RX ADMIN — SODIUM CHLORIDE 50 MILLILITER(S): 9 INJECTION INTRAMUSCULAR; INTRAVENOUS; SUBCUTANEOUS at 01:16

## 2019-05-14 RX ADMIN — LISINOPRIL 20 MILLIGRAM(S): 2.5 TABLET ORAL at 06:26

## 2019-05-14 RX ADMIN — MORPHINE SULFATE 2 MILLIGRAM(S): 50 CAPSULE, EXTENDED RELEASE ORAL at 12:45

## 2019-05-14 RX ADMIN — BUPROPION HYDROCHLORIDE 300 MILLIGRAM(S): 150 TABLET, EXTENDED RELEASE ORAL at 12:37

## 2019-05-14 NOTE — H&P ADULT - ASSESSMENT
# Mechanical Fall  -Trauma workup negative except for non displaced fracture in left anterior acetabulum  -Pain control, PT rehab evaluation.  -No motor or sensory weakness grossly.   -Slight Hypernatremia, Give IV fluids and Monitor CMP    # HTN  - Continue with Diltiazem, lisinopril and aspirin    # Dyslipidemia  -On Statin    # Smoking cessation  -On Bupropion    # DVT prophylaxis  -On Lovenox    # Patient Unsure of the Medication Dosages. Denies any tremor or parkinson's disease. Confirm from the Pharmacy in the morning.

## 2019-05-14 NOTE — H&P ADULT - HISTORY OF PRESENT ILLNESS
JOSEPH POPE 84y Male  MRN#: [id number]       SUBJECTIVE  84y old Male with past medical history of HTN and dyslipidemia presented here after a mechanical fall. As per the patient this afternoon, he was walking on the side walk going for his physiotherapy session. There was construction work going on the side walk where he slipped and landed on his left hip. He denies hitting his head or back, no loss of consciousness, no bleeding, no change in vision, no headaches, no chest pain or abdominal pain. He only complains of sharp radiating pain in his left leg starting from his groin to the knee and is not able to move his leg because of pain. He denies any weakness or numbness.

## 2019-05-14 NOTE — H&P ADULT - ATTENDING COMMENTS
pt seen and examined, I have read and agree with above exam and poa,  wife at bedside  mild pain    unsteady gait  s/p mech fall  fracture acetabulem  no loc/head trauma    ortho eval  pain meds  stool softerners  dvt proph  rehab/pt will need str/snf

## 2019-05-14 NOTE — H&P ADULT - NSHPPHYSICALEXAM_GEN_ALL_CORE
GENERAL: NAD, well-developed, AAOx3  HEENT:  Atraumatic, Normocephalic.  PULMONARY: Clear to auscultation bilaterally  CARDIOVASCULAR: Regular rate and rhythm  GASTROINTESTINAL: Soft, Nontender, Nondistended, Left groin tenderness noted.   EXTREMITIES:  2+ Peripheral Pulses, No edema  NEUROLOGY: non-focal, Lower extremity left movement restricted due to pain, pain unable to bear weight. Right lower extremity motor strength normal. Reflexes +2 knee bilaterally.

## 2019-05-14 NOTE — CONSULT NOTE ADULT - SUBJECTIVE AND OBJECTIVE BOX
HPI:  JOSEPH POPE 84y Male  MRN#: [id number]       SUBJECTIVE  84y old Male with past medical history of HTN and dyslipidemia presented here after a mechanical fall. As per the patient this afternoon, he was walking on the side walk going for his physiotherapy session. There was construction work going on the side walk where he slipped and landed on his left hip. He denies hitting his head or back, no loss of consciousness, no bleeding, no change in vision, no headaches, no chest pain or abdominal pain. He only complains of sharp radiating pain in his left leg starting from his groin to the knee and is not able to move his leg because of pain. He denies any weakness or numbness. (14 May 2019 00:17)      PAST MEDICAL & SURGICAL HISTORY:  High cholesterol  Hypertension, unspecified type      Hospital Course:  He fell tripping over unlevel surface with sidewalk/road contruction. He suffered a left acetabular ant column fx. he is cleared for WBAT LLE. He was going to PT for right knee OA.  TODAY'S SUBJECTIVE & REVIEW OF SYMPTOMS:     Constitutional WNL   Cardio WNL   Resp WNL   GI WNL  Heme WNL  Endo WNL  Skin WNL  MSK WNL  Neuro WNL  Cognitive WNL  Psych WNL      MEDICATIONS  (STANDING):  aspirin  chewable 81 milliGRAM(s) Oral daily  atorvastatin 80 milliGRAM(s) Oral at bedtime  buPROPion XL . 300 milliGRAM(s) Oral daily  chlorhexidine 4% Liquid 1 Application(s) Topical <User Schedule>  diltiazem    milliGRAM(s) Oral daily  enoxaparin Injectable 40 milliGRAM(s) SubCutaneous daily  labetalol Injectable 10 milliGRAM(s) IV Push once  lisinopril 20 milliGRAM(s) Oral daily    MEDICATIONS  (PRN):  ibuprofen  Tablet. 400 milliGRAM(s) Oral every 6 hours PRN Mild Pain (1 - 3)  morphine  - Injectable 2 milliGRAM(s) IV Push every 4 hours PRN Moderate Pain (4 - 6)      FAMILY HISTORY:      Allergies    No Known Allergies    Intolerances        SOCIAL HISTORY:    [  ] Etoh  [  ] Smoking  [  ] Substance abuse     Home Environment:  [  ] Home Alone  [ x ] Lives with friend  [  ] Home Health Aid    Dwelling:  [  ] Apartment  [  ] Private House  [  ] Adult Home  [  ] Skilled Nursing Facility      [  ] Short Term  [  ] Long Term  [  ] Stairs       Elevator [  ]    FUNCTIONAL STATUS PTA: (Check all that apply)  Ambulation: [   ]Independent    [  ] Dependent     [  ] Non-Ambulatory  Assistive Device: [  ] SA Cane  [  ]  Q Cane  [  ] Walker  [  ]  Wheelchair  ADL : [  ] Independent  [  ]  Dependent       Vital Signs Last 24 Hrs  T(C): 36.1 (14 May 2019 14:12), Max: 37.1 (13 May 2019 19:24)  T(F): 97 (14 May 2019 14:12), Max: 98.7 (13 May 2019 19:24)  HR: 67 (14 May 2019 14:12) (65 - 89)  BP: 168/69 (14 May 2019 14:12) (130/60 - 200/87)  BP(mean): --  RR: 18 (14 May 2019 14:12) (16 - 18)  SpO2: 100% (14 May 2019 12:34) (98% - 100%)      PHYSICAL EXAM: Alert & Oriented X3  GENERAL: NAD, well-groomed, well-developed  HEAD:  Atraumatic, Normocephalic  EYES: EOMI, PERRLA, conjunctiva and sclera clear  NECK: Supple, No JVD, Normal thyroid  CHEST/LUNG: Clear to percussion bilaterally; No rales, rhonchi, wheezing, or rubs  HEART: Regular rate and rhythm; No murmurs, rubs, or gallops  ABDOMEN: Soft, Nontender, Nondistended; Bowel sounds present  EXTREMITIES:  2+ Peripheral Pulses, No clubbing, cyanosis, or edema    NERVOUS SYSTEM:  Cranial Nerves 2-12 intact [  ] Abnormal  [  ]  ROM: WFL all extremities [  ]  Abnormal [  ]  Motor Strength: WFL all extremities  [  ]  Abnormal [  ]some pain with left hip flexion; ankle DF/PF 5/5  Sensation: intact to light touch [  ] Abnormal [  ]  Reflexes: Symmetric [  ]  Abnormal [  ]    FUNCTIONAL STATUS:  Bed Mobility: Independent [  ]  Supervision [  ]  Needs Assistance [  ]  N/A [  ]  Transfers: Independent [  ]  Supervision [  ]  Needs Assistance [  ]  N/A [  ]   Ambulation: Independent [  ]  Supervision [  ]  Needs Assistance [  ]  N/A [  ]  ADL: Independent [  ] Requires Assistance [  ] N/A [  ]      LABS:                        14.0   8.87  )-----------( 213      ( 14 May 2019 11:56 )             41.6     05-14    143  |  106  |  15  ----------------------------<  89  3.7   |  25  |  0.8    Ca    9.0      14 May 2019 11:56  Mg     1.8     05-14    TPro  6.0  /  Alb  3.9  /  TBili  0.3  /  DBili  x   /  AST  10  /  ALT  16  /  AlkPhos  85  05-14          RADIOLOGY & ADDITIONAL STUDIES:    Assesment:

## 2019-05-14 NOTE — CONSULT NOTE ADULT - SUBJECTIVE AND OBJECTIVE BOX
ORTHO INPATIENT CONSULT  JOSEPH POPE    84M community ambulator without assistive device presents with left hip pain. The patient states he was walking outdoors when he fell onto his left hip. Complaining of pain localized to his left hip. Denies pain or injury to rest of extremity. Denies head trauma or LOC.    PMHx:  HTN, HLD    PSx:  Denies    ALL:  NKDA    Social Hx:  Denies alcohol or tobacco use    PE :  B/L UE :  No open skin or wounds  NTTP extremity  FAROM extremity  AIN PIN U motor intact  SILT R U M Ax  2+ radial pulse    RLE :  No open skin or wounds  No pain with log roll, or axial loading at the hip  Able to SLR  NTTP extremity  FAROM extremity without pain  EHL TA GS motor intact  SILT distally  Foot warm and perfused    LLE :  No open skin or wounds  Mild pain with log roll, axial loading at the hip  Able to SLR  NTTP extremity  FAROM knee and ankle without pain  EHL TA GS motor intact  SILT distally  Foot warm and perfused    Imaging reviewed: Left anterior column acetabulum/high superior pubic ramus fracture nondisplaced    A/P  84M with left nondisplaced anterior column acetabulum fracture:  - No acute ortho intervention  - WBAT LLE  - PT OT OOBTC  - Dispo per primary team  - Medical management per primary team  - Follow-up with Cayden Sauceda MD as outpatient, call  to make appointment

## 2019-05-14 NOTE — H&P ADULT - NSHPLABSRESULTS_GEN_ALL_CORE
VITAL SIGNS: Last 24 Hours  T(C): 36.8 (13 May 2019 22:23), Max: 37.1 (13 May 2019 19:24)  T(F): 98.2 (13 May 2019 22:23), Max: 98.7 (13 May 2019 19:24)  HR: 89 (13 May 2019 22:23) (75 - 89)  BP: 136/80 (13 May 2019 22:23) (136/80 - 203/88)  BP(mean): --  RR: 18 (13 May 2019 22:23) (16 - 18)  SpO2: 99% (13 May 2019 22:23) (98% - 99%)    LABS:                        15.0   11.90 )-----------( 241      ( 13 May 2019 17:20 )             45.4     05-13    147<H>  |  107  |  22<H>  ----------------------------<  85  4.5   |  27  |  1.0    Ca    9.8      13 May 2019 17:20    TPro  6.7  /  Alb  4.3  /  TBili  0.3  /  DBili  x   /  AST  14  /  ALT  24  /  AlkPhos  102  05-13    < from: CT Cervical Spine No Cont (07.13.18 @ 13:07) >    IMPRESSION:    1.  No evidence of acute cervical spine fracture or subluxation.    2.  Multilevel degenerative changes as described.    < end of copied text >    < from: CT Pelvis No Cont (05.13.19 @ 18:55) >    Impression:    Nondisplaced fracture of the left anterior acetabulum.    Wall thickening of the terminal ileum, may be postinfectious or   inflammatory nature. Further evaluation is necessary as underlying   neoplasm is not excluded    < end of copied text >    < from: Xray Chest 1 View- PORTABLE-Routine (05.13.19 @ 15:36) >    Impression:      No radiographic evidence of acute cardiopulmonary disease.    < end of copied text >    < from: Xray Wrist 3 Views, Left (05.13.19 @ 15:36) >    Impression:  No evidence of acute fracture.    < from: Xray Femur 2 Views, Left (05.13.19 @ 15:34) >    Impression:  No evidence of acute fracture.    < end of copied text >    < from: Xray Knee 3 Views, Left (05.13.19 @ 15:33) >      Impression:  No evidence of acute fracture.    < end of copied text >

## 2019-05-14 NOTE — CONSULT NOTE ADULT - ASSESSMENT
IMPRESSION: Rehab of  left acetabular fx anterior column; right knee OA    PRECAUTIONS: [ x ] Cardiac  [  ] Respiratory  [  ] Seizures [  ] Contact Isolation  [  ] Droplet Isolation  [  ] Other    Weight Bearing Status: WBAT LLE    RECOMMENDATION:    Out of Bed to Chair     DVT/Decubiti Prophylaxis    REHAB PLAN:     [  xx ] Bedside P/T 3-5 times a week   [   ]   Bedside O/T  2-3 times a week             [   ] No Rehab Therapy Indicated                   [   ]  Speech Therapy   Conditioning/ROM                                    ADL  Bed Mobility                                               Conditioning/ROM  Transfers                                                     Bed Mobility  Sitting /Standing Balance                         Transfers                                        Gait Training                                               Sitting/Standing Balance  Stair Training [   ]Applicable                    Home equipment Eval                                                                        Splinting  [   ] Only      GOALS:   ADL   [ x  ]   Independent                    Transfers  [ x  ] Independent                          Ambulation  [  x ] Independent     [    ] With device                            [   ]  CG                                                         [   ]  CG                                                                  [   ] CG                            [    ] Min A                                                   [   ] Min A                                                              [   ] Min  A          DISCHARGE PLAN:   [   ]  Good candidate for Intensive Rehabilitation/Hospital based-[facility]                                             Will tolerate 3hrs Intensive Rehab Daily                                       [    ]  Short Term Rehab in Skilled Nursing Facility                                       [    ]  Home with Outpatient or  services                                         [    ]  Possible Candidate for Intensive Hospital based Rehab

## 2019-05-15 ENCOUNTER — TRANSCRIPTION ENCOUNTER (OUTPATIENT)
Age: 84
End: 2019-05-15

## 2019-05-15 ENCOUNTER — INPATIENT (INPATIENT)
Facility: HOSPITAL | Age: 84
LOS: 8 days | Discharge: HOME | End: 2019-05-24
Attending: PHYSICAL MEDICINE & REHABILITATION | Admitting: PHYSICAL MEDICINE & REHABILITATION
Payer: MEDICARE

## 2019-05-15 VITALS
RESPIRATION RATE: 18 BRPM | SYSTOLIC BLOOD PRESSURE: 144 MMHG | DIASTOLIC BLOOD PRESSURE: 66 MMHG | HEART RATE: 64 BPM | TEMPERATURE: 97 F

## 2019-05-15 LAB
ANION GAP SERPL CALC-SCNC: 9 MMOL/L — SIGNIFICANT CHANGE UP (ref 7–14)
BASOPHILS # BLD AUTO: 0.09 K/UL — SIGNIFICANT CHANGE UP (ref 0–0.2)
BASOPHILS NFR BLD AUTO: 1 % — SIGNIFICANT CHANGE UP (ref 0–1)
BUN SERPL-MCNC: 15 MG/DL — SIGNIFICANT CHANGE UP (ref 10–20)
CALCIUM SERPL-MCNC: 9.3 MG/DL — SIGNIFICANT CHANGE UP (ref 8.5–10.1)
CHLORIDE SERPL-SCNC: 105 MMOL/L — SIGNIFICANT CHANGE UP (ref 98–110)
CO2 SERPL-SCNC: 28 MMOL/L — SIGNIFICANT CHANGE UP (ref 17–32)
CREAT SERPL-MCNC: 0.9 MG/DL — SIGNIFICANT CHANGE UP (ref 0.7–1.5)
EOSINOPHIL # BLD AUTO: 0.64 K/UL — SIGNIFICANT CHANGE UP (ref 0–0.7)
EOSINOPHIL NFR BLD AUTO: 7 % — SIGNIFICANT CHANGE UP (ref 0–8)
GLUCOSE SERPL-MCNC: 102 MG/DL — HIGH (ref 70–99)
HCT VFR BLD CALC: 42.2 % — SIGNIFICANT CHANGE UP (ref 42–52)
HGB BLD-MCNC: 14.1 G/DL — SIGNIFICANT CHANGE UP (ref 14–18)
IMM GRANULOCYTES NFR BLD AUTO: 0.2 % — SIGNIFICANT CHANGE UP (ref 0.1–0.3)
LYMPHOCYTES # BLD AUTO: 1.49 K/UL — SIGNIFICANT CHANGE UP (ref 1.2–3.4)
LYMPHOCYTES # BLD AUTO: 16.4 % — LOW (ref 20.5–51.1)
MCHC RBC-ENTMCNC: 30.9 PG — SIGNIFICANT CHANGE UP (ref 27–31)
MCHC RBC-ENTMCNC: 33.4 G/DL — SIGNIFICANT CHANGE UP (ref 32–37)
MCV RBC AUTO: 92.5 FL — SIGNIFICANT CHANGE UP (ref 80–94)
MONOCYTES # BLD AUTO: 1.11 K/UL — HIGH (ref 0.1–0.6)
MONOCYTES NFR BLD AUTO: 12.2 % — HIGH (ref 1.7–9.3)
NEUTROPHILS # BLD AUTO: 5.73 K/UL — SIGNIFICANT CHANGE UP (ref 1.4–6.5)
NEUTROPHILS NFR BLD AUTO: 63.2 % — SIGNIFICANT CHANGE UP (ref 42.2–75.2)
NRBC # BLD: 0 /100 WBCS — SIGNIFICANT CHANGE UP (ref 0–0)
PLATELET # BLD AUTO: 212 K/UL — SIGNIFICANT CHANGE UP (ref 130–400)
POTASSIUM SERPL-MCNC: 5.2 MMOL/L — HIGH (ref 3.5–5)
POTASSIUM SERPL-SCNC: 5.2 MMOL/L — HIGH (ref 3.5–5)
RBC # BLD: 4.56 M/UL — LOW (ref 4.7–6.1)
RBC # FLD: 14.2 % — SIGNIFICANT CHANGE UP (ref 11.5–14.5)
SODIUM SERPL-SCNC: 142 MMOL/L — SIGNIFICANT CHANGE UP (ref 135–146)
WBC # BLD: 9.08 K/UL — SIGNIFICANT CHANGE UP (ref 4.8–10.8)
WBC # FLD AUTO: 9.08 K/UL — SIGNIFICANT CHANGE UP (ref 4.8–10.8)

## 2019-05-15 RX ORDER — OXYCODONE HYDROCHLORIDE 5 MG/1
5 TABLET ORAL EVERY 4 HOURS
Refills: 0 | Status: DISCONTINUED | OUTPATIENT
Start: 2019-05-15 | End: 2019-05-17

## 2019-05-15 RX ORDER — BUPROPION HYDROCHLORIDE 150 MG/1
300 TABLET, EXTENDED RELEASE ORAL DAILY
Refills: 0 | Status: DISCONTINUED | OUTPATIENT
Start: 2019-05-15 | End: 2019-05-24

## 2019-05-15 RX ORDER — ATORVASTATIN CALCIUM 80 MG/1
80 TABLET, FILM COATED ORAL AT BEDTIME
Refills: 0 | Status: DISCONTINUED | OUTPATIENT
Start: 2019-05-15 | End: 2019-05-24

## 2019-05-15 RX ORDER — ACETAMINOPHEN 500 MG
650 TABLET ORAL EVERY 4 HOURS
Refills: 0 | Status: DISCONTINUED | OUTPATIENT
Start: 2019-05-15 | End: 2019-05-17

## 2019-05-15 RX ORDER — DILTIAZEM HCL 120 MG
240 CAPSULE, EXT RELEASE 24 HR ORAL DAILY
Refills: 0 | Status: DISCONTINUED | OUTPATIENT
Start: 2019-05-15 | End: 2019-05-24

## 2019-05-15 RX ORDER — OXYCODONE HYDROCHLORIDE 5 MG/1
10 TABLET ORAL EVERY 4 HOURS
Refills: 0 | Status: DISCONTINUED | OUTPATIENT
Start: 2019-05-15 | End: 2019-05-17

## 2019-05-15 RX ORDER — ASPIRIN/CALCIUM CARB/MAGNESIUM 324 MG
81 TABLET ORAL DAILY
Refills: 0 | Status: DISCONTINUED | OUTPATIENT
Start: 2019-05-15 | End: 2019-05-24

## 2019-05-15 RX ORDER — LISINOPRIL 2.5 MG/1
20 TABLET ORAL DAILY
Refills: 0 | Status: DISCONTINUED | OUTPATIENT
Start: 2019-05-15 | End: 2019-05-24

## 2019-05-15 RX ORDER — MAGNESIUM HYDROXIDE 400 MG/1
30 TABLET, CHEWABLE ORAL AT BEDTIME
Refills: 0 | Status: DISCONTINUED | OUTPATIENT
Start: 2019-05-15 | End: 2019-05-24

## 2019-05-15 RX ORDER — PROPRANOLOL HCL 160 MG
20 CAPSULE, EXTENDED RELEASE 24HR ORAL DAILY
Refills: 0 | Status: DISCONTINUED | OUTPATIENT
Start: 2019-05-15 | End: 2019-05-24

## 2019-05-15 RX ORDER — DOCUSATE SODIUM 100 MG
100 CAPSULE ORAL THREE TIMES A DAY
Refills: 0 | Status: DISCONTINUED | OUTPATIENT
Start: 2019-05-15 | End: 2019-05-24

## 2019-05-15 RX ORDER — RIVASTIGMINE 4.6 MG/24H
1 PATCH, EXTENDED RELEASE TRANSDERMAL
Refills: 0 | Status: DISCONTINUED | OUTPATIENT
Start: 2019-05-15 | End: 2019-05-21

## 2019-05-15 RX ORDER — LISINOPRIL 2.5 MG/1
1 TABLET ORAL
Qty: 0 | Refills: 0 | DISCHARGE

## 2019-05-15 RX ORDER — LISINOPRIL 2.5 MG/1
1 TABLET ORAL
Qty: 0 | Refills: 0 | DISCHARGE
Start: 2019-05-15

## 2019-05-15 RX ORDER — ENOXAPARIN SODIUM 100 MG/ML
40 INJECTION SUBCUTANEOUS DAILY
Refills: 0 | Status: DISCONTINUED | OUTPATIENT
Start: 2019-05-15 | End: 2019-05-24

## 2019-05-15 RX ADMIN — BUPROPION HYDROCHLORIDE 300 MILLIGRAM(S): 150 TABLET, EXTENDED RELEASE ORAL at 12:35

## 2019-05-15 RX ADMIN — Medication 240 MILLIGRAM(S): at 06:05

## 2019-05-15 RX ADMIN — ENOXAPARIN SODIUM 40 MILLIGRAM(S): 100 INJECTION SUBCUTANEOUS at 12:36

## 2019-05-15 RX ADMIN — ATORVASTATIN CALCIUM 80 MILLIGRAM(S): 80 TABLET, FILM COATED ORAL at 21:11

## 2019-05-15 RX ADMIN — CHLORHEXIDINE GLUCONATE 1 APPLICATION(S): 213 SOLUTION TOPICAL at 06:05

## 2019-05-15 RX ADMIN — Medication 81 MILLIGRAM(S): at 12:35

## 2019-05-15 RX ADMIN — Medication 100 MILLIGRAM(S): at 21:11

## 2019-05-15 RX ADMIN — LISINOPRIL 20 MILLIGRAM(S): 2.5 TABLET ORAL at 06:05

## 2019-05-15 NOTE — DISCHARGE NOTE PROVIDER - HOSPITAL COURSE
84y old Male with past medical history of HTN and dyslipidemia presented here after a mechanical fall. He slipped and landed on his left hip.     On presentation only complaint was radiating pain in his left leg starting from his groin to the knee and is not able to move his leg because of pain, no weakness or numbness.      He was hemodynamically stable with no electrolytes abnormalities, CT Pelvis No Cont (05.13.19 @ 18:55) >Nondisplaced fracture of the left anterior acetabulum and Wall thickeningof the terminal ileum, may be postinfectious or     inflammatory nature. Further evaluation recommended as outpatient. Xray Wrist 3 Views, Left (05.13.19 @ 15:36) >No evidence of acute fracture.     Xray Knee 3 Views, Left (05.13.19 @ 15:33) >No evidence of acute fracture. Xray Pelvis AP only (05.13.19 @ 15:33) >No evidence of acute fracture.    evaluated by ortho, weight bearing status is WBAT, no intervention is indicated. PT evaluated the patient to determine th discharge plan. will discharge patient to 84y old Male with past medical history of HTN and dyslipidemia presented here after a mechanical fall. He slipped and landed on his left hip.     On presentation only complaint was radiating pain in his left leg starting from his groin to the knee and is not able to move his leg because of pain, no weakness or numbness.      He was hemodynamically stable with no electrolytes abnormalities, CT Pelvis No Cont (05.13.19 @ 18:55) >Nondisplaced fracture of the left anterior acetabulum and Wall thickeningof the terminal ileum, may be postinfectious or     inflammatory nature. Further evaluation recommended as outpatient. Xray Wrist 3 Views, Left (05.13.19 @ 15:36) >No evidence of acute fracture.     Xray Knee 3 Views, Left (05.13.19 @ 15:33) >No evidence of acute fracture. Xray Pelvis AP only (05.13.19 @ 15:33) >No evidence of acute fracture.    evaluated by ortho, weight bearing status is WBAT, no intervention is indicated. PT evaluated the patient to determine th discharge plan. will discharge patient to SNF for STR

## 2019-05-15 NOTE — PHYSICAL THERAPY INITIAL EVALUATION ADULT - GENERAL OBSERVATIONS, REHAB EVAL
Pt was seen from 8:55-9:30 for a total of 35 minutes. Pt encountered ambulating from bathroom to bed using rolling walker, No apparent distress. Patients significant other Ines was present.

## 2019-05-15 NOTE — DISCHARGE NOTE PROVIDER - PROVIDER TOKENS
PROVIDER:[TOKEN:[[id number]]],FREE:[LAST:[Cayden Sauceda MD],PHONE:[(   )    -],FAX:[(   )    -],ADDRESS:[call  to make appointment]]

## 2019-05-15 NOTE — PROGRESS NOTE ADULT - ASSESSMENT
unsteady gait  s/p mech fall  fracture acetabulem    rehab/pt  pain meds  stool sfteners  dvt proph  awaiting auth

## 2019-05-15 NOTE — PROGRESS NOTE ADULT - SUBJECTIVE AND OBJECTIVE BOX
Patient was seen and examined. Spoke with RN. Chart reviewed.  ambulated with pt  awaiting auth for str    No events overnight.  Vital Signs Last 24 Hrs  T(F): 97.3 (15 May 2019 13:08), Max: 98.4 (14 May 2019 16:00)  HR: 64 (15 May 2019 13:08) (60 - 67)  BP: 144/66 (15 May 2019 13:08) (127/60 - 168/69)  SpO2: --  MEDICATIONS  (STANDING):  aspirin  chewable 81 milliGRAM(s) Oral daily  atorvastatin 80 milliGRAM(s) Oral at bedtime  buPROPion XL . 300 milliGRAM(s) Oral daily  chlorhexidine 4% Liquid 1 Application(s) Topical <User Schedule>  diltiazem    milliGRAM(s) Oral daily  enoxaparin Injectable 40 milliGRAM(s) SubCutaneous daily  labetalol Injectable 10 milliGRAM(s) IV Push once  lisinopril 20 milliGRAM(s) Oral daily    MEDICATIONS  (PRN):  ibuprofen  Tablet. 400 milliGRAM(s) Oral every 6 hours PRN Mild Pain (1 - 3)  morphine  - Injectable 2 milliGRAM(s) IV Push every 4 hours PRN Moderate Pain (4 - 6)    Labs:                        14.1   9.08  )-----------( 212      ( 15 May 2019 06:47 )             42.2                         14.0   8.87  )-----------( 213      ( 14 May 2019 11:56 )             41.6     15 May 2019 06:47    142    |  105    |  15     ----------------------------<  102    5.2     |  28     |  0.9    14 May 2019 11:56    143    |  106    |  15     ----------------------------<  89     3.7     |  25     |  0.8      Ca    9.3        15 May 2019 06:47  Ca    9.0        14 May 2019 11:56  Mg     1.8       14 May 2019 11:56    TPro  6.0    /  Alb  3.9    /  TBili  0.3    /  DBili  x      /  AST  10     /  ALT  16     /  AlkPhos  85     14 May 2019 11:56  TPro  6.7    /  Alb  4.3    /  TBili  0.3    /  DBili  x      /  AST  14     /  ALT  24     /  AlkPhos  102    13 May 2019 17:20            Radiology:    General: comfortable, NAD  Neurology: A&Ox3, nonfocal  Head:  Normocephalic, atraumatic  ENT:  Mucosa moist, no ulcerations  Neck:  Supple, no JVD,   Skin: no breakdowns (as per RN)  Resp: CTA B/L  CV: RRR, S1S2,   GI: Soft, NT, bowel sounds  MS: No edema, + peripheral pulses, FROM all 4 extremity

## 2019-05-15 NOTE — DISCHARGE NOTE PROVIDER - NSDCCPCAREPLAN_GEN_ALL_CORE_FT
PRINCIPAL DISCHARGE DIAGNOSIS  Diagnosis: Acetabular fracture  Assessment and Plan of Treatment: no surgical intervention is indicated for this type of fractures, you need extensive physical therapy and strenghthining pelvic muscles. follow up with DR Cayden Sauceda MD as outpatient, call  to make appointment      SECONDARY DISCHARGE DIAGNOSES  Diagnosis: Disorder of ileum  Assessment and Plan of Treatment: CT scan showed thickining of the terminal ileum which can be a resolving inflammatory process of malignancy ( abnormal growth ), please follow up with your primary care doctor and gastroenterologist for further work up.

## 2019-05-15 NOTE — PHYSICAL THERAPY INITIAL EVALUATION ADULT - PERTINENT HX OF CURRENT PROBLEM, REHAB EVAL
Pt adm due fall on 5/14. Imaging revealed left acetabular fx Pt adm due fall on 5/14. Imaging revealed left acetabular fx, pt cleared by ortho to be WBAT LLE.

## 2019-05-15 NOTE — DISCHARGE NOTE NURSING/CASE MANAGEMENT/SOCIAL WORK - NSDCDPATPORTLINK_GEN_ALL_CORE
You can access the [facility] Patient Portal, offered by [facility], by registering with the following website: [email or web address]

## 2019-05-15 NOTE — PHYSICAL THERAPY INITIAL EVALUATION ADULT - GAIT DEVIATIONS NOTED, PT EVAL
decreased weight-shifting ability/Stepping too far from device, limited heel-toe gait mechanics/decreased step length/decreased jose g decreased step length/decreased weight-shifting ability/Stepping too far from device, limited heel-toe gait mechanics. Pt cued to correct and she was unable to perform./decreased jose g

## 2019-05-15 NOTE — PHYSICAL THERAPY INITIAL EVALUATION ADULT - PLANNED THERAPY INTERVENTIONS, PT EVAL
ROM/bed mobility training/strengthening/transfer training/balance training/gait training/neuromuscular re-education/postural re-education

## 2019-05-16 LAB
APPEARANCE UR: CLEAR — SIGNIFICANT CHANGE UP
BILIRUB UR-MCNC: NEGATIVE — SIGNIFICANT CHANGE UP
COLOR SPEC: YELLOW — SIGNIFICANT CHANGE UP
DIFF PNL FLD: NEGATIVE — SIGNIFICANT CHANGE UP
GLUCOSE UR QL: NEGATIVE MG/DL — SIGNIFICANT CHANGE UP
KETONES UR-MCNC: NEGATIVE — SIGNIFICANT CHANGE UP
LEUKOCYTE ESTERASE UR-ACNC: NEGATIVE — SIGNIFICANT CHANGE UP
NITRITE UR-MCNC: NEGATIVE — SIGNIFICANT CHANGE UP
PH UR: 6 — SIGNIFICANT CHANGE UP (ref 5–8)
PROT UR-MCNC: NEGATIVE MG/DL — SIGNIFICANT CHANGE UP
SP GR SPEC: 1.02 — SIGNIFICANT CHANGE UP (ref 1.01–1.03)
UROBILINOGEN FLD QL: 0.2 MG/DL — SIGNIFICANT CHANGE UP (ref 0.2–0.2)

## 2019-05-16 PROCEDURE — 93010 ELECTROCARDIOGRAM REPORT: CPT

## 2019-05-16 RX ADMIN — Medication 81 MILLIGRAM(S): at 14:05

## 2019-05-16 RX ADMIN — LISINOPRIL 20 MILLIGRAM(S): 2.5 TABLET ORAL at 05:15

## 2019-05-16 RX ADMIN — BUPROPION HYDROCHLORIDE 300 MILLIGRAM(S): 150 TABLET, EXTENDED RELEASE ORAL at 14:07

## 2019-05-16 RX ADMIN — Medication 100 MILLIGRAM(S): at 14:06

## 2019-05-16 RX ADMIN — RIVASTIGMINE 1 PATCH: 4.6 PATCH, EXTENDED RELEASE TRANSDERMAL at 19:30

## 2019-05-16 RX ADMIN — Medication 100 MILLIGRAM(S): at 05:14

## 2019-05-16 RX ADMIN — Medication 650 MILLIGRAM(S): at 19:29

## 2019-05-16 RX ADMIN — ATORVASTATIN CALCIUM 80 MILLIGRAM(S): 80 TABLET, FILM COATED ORAL at 21:26

## 2019-05-16 RX ADMIN — Medication 20 MILLIGRAM(S): at 05:16

## 2019-05-16 RX ADMIN — Medication 100 MILLIGRAM(S): at 21:26

## 2019-05-16 RX ADMIN — Medication 240 MILLIGRAM(S): at 05:16

## 2019-05-16 RX ADMIN — ENOXAPARIN SODIUM 40 MILLIGRAM(S): 100 INJECTION SUBCUTANEOUS at 14:06

## 2019-05-16 RX ADMIN — Medication 650 MILLIGRAM(S): at 19:59

## 2019-05-17 LAB
ANION GAP SERPL CALC-SCNC: 10 MMOL/L — SIGNIFICANT CHANGE UP (ref 7–14)
BUN SERPL-MCNC: 15 MG/DL — SIGNIFICANT CHANGE UP (ref 10–20)
CALCIUM SERPL-MCNC: 9 MG/DL — SIGNIFICANT CHANGE UP (ref 8.5–10.1)
CHLORIDE SERPL-SCNC: 105 MMOL/L — SIGNIFICANT CHANGE UP (ref 98–110)
CO2 SERPL-SCNC: 26 MMOL/L — SIGNIFICANT CHANGE UP (ref 17–32)
CREAT SERPL-MCNC: 1 MG/DL — SIGNIFICANT CHANGE UP (ref 0.7–1.5)
GLUCOSE SERPL-MCNC: 103 MG/DL — HIGH (ref 70–99)
HCT VFR BLD CALC: 41.9 % — LOW (ref 42–52)
HGB BLD-MCNC: 13.9 G/DL — LOW (ref 14–18)
MCHC RBC-ENTMCNC: 30.6 PG — SIGNIFICANT CHANGE UP (ref 27–31)
MCHC RBC-ENTMCNC: 33.2 G/DL — SIGNIFICANT CHANGE UP (ref 32–37)
MCV RBC AUTO: 92.3 FL — SIGNIFICANT CHANGE UP (ref 80–94)
NRBC # BLD: 0 /100 WBCS — SIGNIFICANT CHANGE UP (ref 0–0)
PLATELET # BLD AUTO: 206 K/UL — SIGNIFICANT CHANGE UP (ref 130–400)
POTASSIUM SERPL-MCNC: 5 MMOL/L — SIGNIFICANT CHANGE UP (ref 3.5–5)
POTASSIUM SERPL-SCNC: 5 MMOL/L — SIGNIFICANT CHANGE UP (ref 3.5–5)
RBC # BLD: 4.54 M/UL — LOW (ref 4.7–6.1)
RBC # FLD: 14 % — SIGNIFICANT CHANGE UP (ref 11.5–14.5)
SODIUM SERPL-SCNC: 141 MMOL/L — SIGNIFICANT CHANGE UP (ref 135–146)
WBC # BLD: 8.3 K/UL — SIGNIFICANT CHANGE UP (ref 4.8–10.8)
WBC # FLD AUTO: 8.3 K/UL — SIGNIFICANT CHANGE UP (ref 4.8–10.8)

## 2019-05-17 RX ORDER — LIDOCAINE 4 G/100G
1 CREAM TOPICAL DAILY
Refills: 0 | Status: DISCONTINUED | OUTPATIENT
Start: 2019-05-17 | End: 2019-05-24

## 2019-05-17 RX ORDER — OXYCODONE HYDROCHLORIDE 5 MG/1
5 TABLET ORAL EVERY 4 HOURS
Refills: 0 | Status: DISCONTINUED | OUTPATIENT
Start: 2019-05-17 | End: 2019-05-17

## 2019-05-17 RX ORDER — ACETAMINOPHEN 500 MG
650 TABLET ORAL EVERY 6 HOURS
Refills: 0 | Status: DISCONTINUED | OUTPATIENT
Start: 2019-05-17 | End: 2019-05-24

## 2019-05-17 RX ORDER — OXYCODONE HYDROCHLORIDE 5 MG/1
5 TABLET ORAL EVERY 6 HOURS
Refills: 0 | Status: DISCONTINUED | OUTPATIENT
Start: 2019-05-17 | End: 2019-05-17

## 2019-05-17 RX ADMIN — Medication 240 MILLIGRAM(S): at 05:54

## 2019-05-17 RX ADMIN — ENOXAPARIN SODIUM 40 MILLIGRAM(S): 100 INJECTION SUBCUTANEOUS at 12:33

## 2019-05-17 RX ADMIN — Medication 650 MILLIGRAM(S): at 04:01

## 2019-05-17 RX ADMIN — Medication 100 MILLIGRAM(S): at 12:34

## 2019-05-17 RX ADMIN — OXYCODONE HYDROCHLORIDE 5 MILLIGRAM(S): 5 TABLET ORAL at 12:30

## 2019-05-17 RX ADMIN — Medication 20 MILLIGRAM(S): at 05:56

## 2019-05-17 RX ADMIN — LISINOPRIL 20 MILLIGRAM(S): 2.5 TABLET ORAL at 05:56

## 2019-05-17 RX ADMIN — Medication 81 MILLIGRAM(S): at 12:32

## 2019-05-17 RX ADMIN — LIDOCAINE 1 PATCH: 4 CREAM TOPICAL at 17:44

## 2019-05-17 RX ADMIN — RIVASTIGMINE 1 PATCH: 4.6 PATCH, EXTENDED RELEASE TRANSDERMAL at 17:47

## 2019-05-17 RX ADMIN — OXYCODONE HYDROCHLORIDE 5 MILLIGRAM(S): 5 TABLET ORAL at 08:13

## 2019-05-17 RX ADMIN — RIVASTIGMINE 1 PATCH: 4.6 PATCH, EXTENDED RELEASE TRANSDERMAL at 20:02

## 2019-05-17 RX ADMIN — Medication 100 MILLIGRAM(S): at 05:56

## 2019-05-17 RX ADMIN — RIVASTIGMINE 1 PATCH: 4.6 PATCH, EXTENDED RELEASE TRANSDERMAL at 06:02

## 2019-05-17 RX ADMIN — LIDOCAINE 1 PATCH: 4 CREAM TOPICAL at 13:53

## 2019-05-17 RX ADMIN — BUPROPION HYDROCHLORIDE 300 MILLIGRAM(S): 150 TABLET, EXTENDED RELEASE ORAL at 12:32

## 2019-05-17 RX ADMIN — Medication 650 MILLIGRAM(S): at 01:55

## 2019-05-17 RX ADMIN — RIVASTIGMINE 1 PATCH: 4.6 PATCH, EXTENDED RELEASE TRANSDERMAL at 20:00

## 2019-05-18 PROCEDURE — 99222 1ST HOSP IP/OBS MODERATE 55: CPT

## 2019-05-18 RX ADMIN — BUPROPION HYDROCHLORIDE 300 MILLIGRAM(S): 150 TABLET, EXTENDED RELEASE ORAL at 12:33

## 2019-05-18 RX ADMIN — RIVASTIGMINE 1 PATCH: 4.6 PATCH, EXTENDED RELEASE TRANSDERMAL at 20:53

## 2019-05-18 RX ADMIN — Medication 100 MILLIGRAM(S): at 06:21

## 2019-05-18 RX ADMIN — Medication 240 MILLIGRAM(S): at 06:21

## 2019-05-18 RX ADMIN — Medication 100 MILLIGRAM(S): at 21:45

## 2019-05-18 RX ADMIN — RIVASTIGMINE 1 PATCH: 4.6 PATCH, EXTENDED RELEASE TRANSDERMAL at 19:30

## 2019-05-18 RX ADMIN — Medication 81 MILLIGRAM(S): at 12:33

## 2019-05-18 RX ADMIN — Medication 100 MILLIGRAM(S): at 13:18

## 2019-05-18 RX ADMIN — ENOXAPARIN SODIUM 40 MILLIGRAM(S): 100 INJECTION SUBCUTANEOUS at 12:34

## 2019-05-18 RX ADMIN — RIVASTIGMINE 1 PATCH: 4.6 PATCH, EXTENDED RELEASE TRANSDERMAL at 10:17

## 2019-05-18 RX ADMIN — Medication 20 MILLIGRAM(S): at 06:21

## 2019-05-18 RX ADMIN — LISINOPRIL 20 MILLIGRAM(S): 2.5 TABLET ORAL at 06:21

## 2019-05-18 RX ADMIN — ATORVASTATIN CALCIUM 80 MILLIGRAM(S): 80 TABLET, FILM COATED ORAL at 21:45

## 2019-05-18 RX ADMIN — LIDOCAINE 1 PATCH: 4 CREAM TOPICAL at 01:53

## 2019-05-19 PROCEDURE — 70450 CT HEAD/BRAIN W/O DYE: CPT | Mod: 26

## 2019-05-19 RX ORDER — QUETIAPINE FUMARATE 200 MG/1
25 TABLET, FILM COATED ORAL EVERY 6 HOURS
Refills: 0 | Status: DISCONTINUED | OUTPATIENT
Start: 2019-05-19 | End: 2019-05-24

## 2019-05-19 RX ADMIN — LISINOPRIL 20 MILLIGRAM(S): 2.5 TABLET ORAL at 05:52

## 2019-05-19 RX ADMIN — RIVASTIGMINE 1 PATCH: 4.6 PATCH, EXTENDED RELEASE TRANSDERMAL at 06:00

## 2019-05-19 RX ADMIN — BUPROPION HYDROCHLORIDE 300 MILLIGRAM(S): 150 TABLET, EXTENDED RELEASE ORAL at 12:13

## 2019-05-19 RX ADMIN — RIVASTIGMINE 1 PATCH: 4.6 PATCH, EXTENDED RELEASE TRANSDERMAL at 21:34

## 2019-05-19 RX ADMIN — Medication 240 MILLIGRAM(S): at 05:52

## 2019-05-19 RX ADMIN — Medication 100 MILLIGRAM(S): at 21:33

## 2019-05-19 RX ADMIN — RIVASTIGMINE 1 PATCH: 4.6 PATCH, EXTENDED RELEASE TRANSDERMAL at 21:32

## 2019-05-19 RX ADMIN — ATORVASTATIN CALCIUM 80 MILLIGRAM(S): 80 TABLET, FILM COATED ORAL at 21:33

## 2019-05-19 RX ADMIN — Medication 20 MILLIGRAM(S): at 05:52

## 2019-05-19 RX ADMIN — ENOXAPARIN SODIUM 40 MILLIGRAM(S): 100 INJECTION SUBCUTANEOUS at 12:16

## 2019-05-19 RX ADMIN — Medication 100 MILLIGRAM(S): at 13:06

## 2019-05-19 RX ADMIN — Medication 100 MILLIGRAM(S): at 05:52

## 2019-05-19 RX ADMIN — Medication 81 MILLIGRAM(S): at 12:13
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
## 2019-05-20 DIAGNOSIS — R26.2 DIFFICULTY IN WALKING, NOT ELSEWHERE CLASSIFIED: ICD-10-CM

## 2019-05-20 DIAGNOSIS — E87.0 HYPEROSMOLALITY AND HYPERNATREMIA: ICD-10-CM

## 2019-05-20 DIAGNOSIS — F17.200 NICOTINE DEPENDENCE, UNSPECIFIED, UNCOMPLICATED: ICD-10-CM
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
## 2019-05-20 DIAGNOSIS — M17.11 UNILATERAL PRIMARY OSTEOARTHRITIS, RIGHT KNEE: ICD-10-CM

## 2019-05-20 DIAGNOSIS — E78.5 HYPERLIPIDEMIA, UNSPECIFIED: ICD-10-CM

## 2019-05-20 DIAGNOSIS — S32.492A OTHER SPECIFIED FRACTURE OF LEFT ACETABULUM, INITIAL ENCOUNTER FOR CLOSED FRACTURE: ICD-10-CM

## 2019-05-20 DIAGNOSIS — I10 ESSENTIAL (PRIMARY) HYPERTENSION: ICD-10-CM

## 2019-05-20 DIAGNOSIS — M25.552 PAIN IN LEFT HIP: ICD-10-CM
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
## 2019-05-20 LAB
ANION GAP SERPL CALC-SCNC: 14 MMOL/L — SIGNIFICANT CHANGE UP (ref 7–14)
BUN SERPL-MCNC: 17 MG/DL — SIGNIFICANT CHANGE UP (ref 10–20)
CALCIUM SERPL-MCNC: 9.4 MG/DL — SIGNIFICANT CHANGE UP (ref 8.5–10.1)
CHLORIDE SERPL-SCNC: 102 MMOL/L — SIGNIFICANT CHANGE UP (ref 98–110)
CO2 SERPL-SCNC: 25 MMOL/L — SIGNIFICANT CHANGE UP (ref 17–32)
CREAT SERPL-MCNC: 0.9 MG/DL — SIGNIFICANT CHANGE UP (ref 0.7–1.5)
GLUCOSE SERPL-MCNC: 111 MG/DL — HIGH (ref 70–99)
HCT VFR BLD CALC: 42.4 % — SIGNIFICANT CHANGE UP (ref 42–52)
HGB BLD-MCNC: 14.5 G/DL — SIGNIFICANT CHANGE UP (ref 14–18)
MCHC RBC-ENTMCNC: 30.9 PG — SIGNIFICANT CHANGE UP (ref 27–31)
MCHC RBC-ENTMCNC: 34.2 G/DL — SIGNIFICANT CHANGE UP (ref 32–37)
MCV RBC AUTO: 90.4 FL — SIGNIFICANT CHANGE UP (ref 80–94)
NRBC # BLD: 0 /100 WBCS — SIGNIFICANT CHANGE UP (ref 0–0)
PLATELET # BLD AUTO: 232 K/UL — SIGNIFICANT CHANGE UP (ref 130–400)
POTASSIUM SERPL-MCNC: 4.7 MMOL/L — SIGNIFICANT CHANGE UP (ref 3.5–5)
POTASSIUM SERPL-SCNC: 4.7 MMOL/L — SIGNIFICANT CHANGE UP (ref 3.5–5)
RBC # BLD: 4.69 M/UL — LOW (ref 4.7–6.1)
RBC # FLD: 14.1 % — SIGNIFICANT CHANGE UP (ref 11.5–14.5)
SODIUM SERPL-SCNC: 141 MMOL/L — SIGNIFICANT CHANGE UP (ref 135–146)
WBC # BLD: 11.18 K/UL — HIGH (ref 4.8–10.8)
WBC # FLD AUTO: 11.18 K/UL — HIGH (ref 4.8–10.8)

## 2019-05-20 PROCEDURE — 95819 EEG AWAKE AND ASLEEP: CPT | Mod: 26

## 2019-05-20 PROCEDURE — 71045 X-RAY EXAM CHEST 1 VIEW: CPT | Mod: 26,59

## 2019-05-20 PROCEDURE — 71046 X-RAY EXAM CHEST 2 VIEWS: CPT | Mod: 26

## 2019-05-20 RX ORDER — LORATADINE 10 MG/1
10 TABLET ORAL DAILY
Refills: 0 | Status: DISCONTINUED | OUTPATIENT
Start: 2019-05-20 | End: 2019-05-24

## 2019-05-20 RX ADMIN — Medication 100 MILLIGRAM(S): at 06:13

## 2019-05-20 RX ADMIN — LIDOCAINE 1 PATCH: 4 CREAM TOPICAL at 12:27

## 2019-05-20 RX ADMIN — RIVASTIGMINE 1 PATCH: 4.6 PATCH, EXTENDED RELEASE TRANSDERMAL at 20:13

## 2019-05-20 RX ADMIN — Medication 100 MILLIGRAM(S): at 21:32

## 2019-05-20 RX ADMIN — LIDOCAINE 1 PATCH: 4 CREAM TOPICAL at 19:55

## 2019-05-20 RX ADMIN — Medication 650 MILLIGRAM(S): at 02:49

## 2019-05-20 RX ADMIN — Medication 81 MILLIGRAM(S): at 12:26

## 2019-05-20 RX ADMIN — Medication 200 MILLIGRAM(S): at 15:04

## 2019-05-20 RX ADMIN — Medication 200 MILLIGRAM(S): at 17:50

## 2019-05-20 RX ADMIN — LORATADINE 10 MILLIGRAM(S): 10 TABLET ORAL at 17:55

## 2019-05-20 RX ADMIN — ENOXAPARIN SODIUM 40 MILLIGRAM(S): 100 INJECTION SUBCUTANEOUS at 12:27

## 2019-05-20 RX ADMIN — RIVASTIGMINE 1 PATCH: 4.6 PATCH, EXTENDED RELEASE TRANSDERMAL at 09:37

## 2019-05-20 RX ADMIN — BUPROPION HYDROCHLORIDE 300 MILLIGRAM(S): 150 TABLET, EXTENDED RELEASE ORAL at 12:28

## 2019-05-20 RX ADMIN — LISINOPRIL 20 MILLIGRAM(S): 2.5 TABLET ORAL at 06:12

## 2019-05-20 RX ADMIN — Medication 100 MILLIGRAM(S): at 12:26

## 2019-05-20 RX ADMIN — Medication 20 MILLIGRAM(S): at 06:13

## 2019-05-20 RX ADMIN — ATORVASTATIN CALCIUM 80 MILLIGRAM(S): 80 TABLET, FILM COATED ORAL at 21:32

## 2019-05-20 RX ADMIN — Medication 100 MILLIGRAM(S): at 06:35

## 2019-05-20 RX ADMIN — Medication 240 MILLIGRAM(S): at 06:12

## 2019-05-20 RX ADMIN — RIVASTIGMINE 1 PATCH: 4.6 PATCH, EXTENDED RELEASE TRANSDERMAL at 21:30

## 2019-05-20 RX ADMIN — RIVASTIGMINE 1 PATCH: 4.6 PATCH, EXTENDED RELEASE TRANSDERMAL at 19:58

## 2019-05-21 PROCEDURE — 99232 SBSQ HOSP IP/OBS MODERATE 35: CPT

## 2019-05-21 PROCEDURE — 70551 MRI BRAIN STEM W/O DYE: CPT | Mod: 26

## 2019-05-21 RX ORDER — ONDANSETRON 8 MG/1
4 TABLET, FILM COATED ORAL EVERY 6 HOURS
Refills: 0 | Status: DISCONTINUED | OUTPATIENT
Start: 2019-05-21 | End: 2019-05-24

## 2019-05-21 RX ORDER — IPRATROPIUM/ALBUTEROL SULFATE 18-103MCG
3 AEROSOL WITH ADAPTER (GRAM) INHALATION ONCE
Refills: 0 | Status: COMPLETED | OUTPATIENT
Start: 2019-05-21 | End: 2019-05-21

## 2019-05-21 RX ORDER — IPRATROPIUM/ALBUTEROL SULFATE 18-103MCG
3 AEROSOL WITH ADAPTER (GRAM) INHALATION EVERY 6 HOURS
Refills: 0 | Status: DISCONTINUED | OUTPATIENT
Start: 2019-05-21 | End: 2019-05-24

## 2019-05-21 RX ADMIN — Medication 200 MILLIGRAM(S): at 11:00

## 2019-05-21 RX ADMIN — Medication 200 MILLIGRAM(S): at 17:38

## 2019-05-21 RX ADMIN — Medication 20 MILLIGRAM(S): at 05:21

## 2019-05-21 RX ADMIN — Medication 100 MILLIGRAM(S): at 21:23

## 2019-05-21 RX ADMIN — Medication 81 MILLIGRAM(S): at 11:00

## 2019-05-21 RX ADMIN — Medication 200 MILLIGRAM(S): at 23:50

## 2019-05-21 RX ADMIN — BUPROPION HYDROCHLORIDE 300 MILLIGRAM(S): 150 TABLET, EXTENDED RELEASE ORAL at 11:00

## 2019-05-21 RX ADMIN — LIDOCAINE 1 PATCH: 4 CREAM TOPICAL at 00:04

## 2019-05-21 RX ADMIN — Medication 100 MILLIGRAM(S): at 05:21

## 2019-05-21 RX ADMIN — LISINOPRIL 20 MILLIGRAM(S): 2.5 TABLET ORAL at 05:21

## 2019-05-21 RX ADMIN — RIVASTIGMINE 1 PATCH: 4.6 PATCH, EXTENDED RELEASE TRANSDERMAL at 21:23

## 2019-05-21 RX ADMIN — Medication 240 MILLIGRAM(S): at 05:21

## 2019-05-21 RX ADMIN — ATORVASTATIN CALCIUM 80 MILLIGRAM(S): 80 TABLET, FILM COATED ORAL at 21:23

## 2019-05-21 RX ADMIN — Medication 200 MILLIGRAM(S): at 00:04

## 2019-05-21 RX ADMIN — Medication 200 MILLIGRAM(S): at 05:21

## 2019-05-21 RX ADMIN — Medication 100 MILLIGRAM(S): at 14:51

## 2019-05-21 RX ADMIN — ENOXAPARIN SODIUM 40 MILLIGRAM(S): 100 INJECTION SUBCUTANEOUS at 11:00

## 2019-05-21 RX ADMIN — LORATADINE 10 MILLIGRAM(S): 10 TABLET ORAL at 11:03

## 2019-05-21 RX ADMIN — RIVASTIGMINE 1 PATCH: 4.6 PATCH, EXTENDED RELEASE TRANSDERMAL at 06:30

## 2019-05-22 RX ADMIN — Medication 200 MILLIGRAM(S): at 06:10

## 2019-05-22 RX ADMIN — Medication 100 MILLIGRAM(S): at 21:46

## 2019-05-22 RX ADMIN — Medication 20 MILLIGRAM(S): at 06:09

## 2019-05-22 RX ADMIN — Medication 100 MILLIGRAM(S): at 14:51

## 2019-05-22 RX ADMIN — Medication 3 MILLILITER(S): at 00:01

## 2019-05-22 RX ADMIN — ATORVASTATIN CALCIUM 80 MILLIGRAM(S): 80 TABLET, FILM COATED ORAL at 21:46

## 2019-05-22 RX ADMIN — Medication 81 MILLIGRAM(S): at 11:34

## 2019-05-22 RX ADMIN — Medication 200 MILLIGRAM(S): at 17:31

## 2019-05-22 RX ADMIN — LISINOPRIL 20 MILLIGRAM(S): 2.5 TABLET ORAL at 06:09

## 2019-05-22 RX ADMIN — Medication 240 MILLIGRAM(S): at 06:09

## 2019-05-22 RX ADMIN — LORATADINE 10 MILLIGRAM(S): 10 TABLET ORAL at 11:34

## 2019-05-22 RX ADMIN — BUPROPION HYDROCHLORIDE 300 MILLIGRAM(S): 150 TABLET, EXTENDED RELEASE ORAL at 11:34

## 2019-05-22 RX ADMIN — ENOXAPARIN SODIUM 40 MILLIGRAM(S): 100 INJECTION SUBCUTANEOUS at 11:33

## 2019-05-22 RX ADMIN — Medication 200 MILLIGRAM(S): at 11:34

## 2019-05-22 RX ADMIN — Medication 100 MILLIGRAM(S): at 06:09

## 2019-05-23 ENCOUNTER — TRANSCRIPTION ENCOUNTER (OUTPATIENT)
Age: 84
End: 2019-05-23

## 2019-05-23 RX ORDER — ROSUVASTATIN CALCIUM 5 MG/1
1 TABLET ORAL
Qty: 0 | Refills: 0 | DISCHARGE

## 2019-05-23 RX ORDER — DILTIAZEM HCL 120 MG
1 CAPSULE, EXT RELEASE 24 HR ORAL
Qty: 30 | Refills: 0
Start: 2019-05-23 | End: 2019-06-21

## 2019-05-23 RX ORDER — ACETAMINOPHEN 500 MG
2 TABLET ORAL
Qty: 180 | Refills: 1
Start: 2019-05-23 | End: 2019-07-21

## 2019-05-23 RX ORDER — BUPROPION HYDROCHLORIDE 150 MG/1
1 TABLET, EXTENDED RELEASE ORAL
Qty: 30 | Refills: 1
Start: 2019-05-23 | End: 2019-07-21

## 2019-05-23 RX ORDER — OXYCODONE HYDROCHLORIDE 5 MG/1
1 TABLET ORAL
Qty: 28 | Refills: 0
Start: 2019-05-23 | End: 2019-05-29

## 2019-05-23 RX ORDER — BUPROPION HYDROCHLORIDE 150 MG/1
1 TABLET, EXTENDED RELEASE ORAL
Qty: 0 | Refills: 0 | DISCHARGE

## 2019-05-23 RX ORDER — LISINOPRIL 2.5 MG/1
1 TABLET ORAL
Qty: 30 | Refills: 1
Start: 2019-05-23 | End: 2019-07-21

## 2019-05-23 RX ORDER — ATORVASTATIN CALCIUM 80 MG/1
1 TABLET, FILM COATED ORAL
Qty: 30 | Refills: 1
Start: 2019-05-23 | End: 2019-07-21

## 2019-05-23 RX ORDER — PROPRANOLOL HCL 160 MG
1 CAPSULE, EXTENDED RELEASE 24HR ORAL
Qty: 30 | Refills: 0
Start: 2019-05-23 | End: 2019-06-21

## 2019-05-23 RX ORDER — BUPROPION HYDROCHLORIDE 150 MG/1
1 TABLET, EXTENDED RELEASE ORAL
Qty: 30 | Refills: 0
Start: 2019-05-23 | End: 2019-06-21

## 2019-05-23 RX ORDER — ACETAMINOPHEN 500 MG
2 TABLET ORAL
Qty: 180 | Refills: 0
Start: 2019-05-23 | End: 2019-06-21

## 2019-05-23 RX ORDER — QUETIAPINE FUMARATE 200 MG/1
1 TABLET, FILM COATED ORAL
Qty: 30 | Refills: 0
Start: 2019-05-23 | End: 2019-06-21

## 2019-05-23 RX ORDER — ASPIRIN/CALCIUM CARB/MAGNESIUM 324 MG
1 TABLET ORAL
Qty: 30 | Refills: 1
Start: 2019-05-23 | End: 2019-07-21

## 2019-05-23 RX ORDER — ASPIRIN/CALCIUM CARB/MAGNESIUM 324 MG
1 TABLET ORAL
Qty: 0 | Refills: 0 | DISCHARGE

## 2019-05-23 RX ORDER — PROPRANOLOL HCL 160 MG
1 CAPSULE, EXTENDED RELEASE 24HR ORAL
Qty: 0 | Refills: 0 | DISCHARGE

## 2019-05-23 RX ORDER — ATORVASTATIN CALCIUM 80 MG/1
1 TABLET, FILM COATED ORAL
Qty: 30 | Refills: 0
Start: 2019-05-23 | End: 2019-06-21

## 2019-05-23 RX ORDER — LISINOPRIL 2.5 MG/1
1 TABLET ORAL
Qty: 30 | Refills: 0
Start: 2019-05-23 | End: 2019-06-21

## 2019-05-23 RX ORDER — DILTIAZEM HCL 120 MG
1 CAPSULE, EXT RELEASE 24 HR ORAL
Qty: 0 | Refills: 0 | DISCHARGE

## 2019-05-23 RX ORDER — ASPIRIN/CALCIUM CARB/MAGNESIUM 324 MG
1 TABLET ORAL
Qty: 30 | Refills: 0
Start: 2019-05-23 | End: 2019-06-21

## 2019-05-23 RX ORDER — QUETIAPINE FUMARATE 200 MG/1
25 TABLET, FILM COATED ORAL ONCE
Refills: 0 | Status: COMPLETED | OUTPATIENT
Start: 2019-05-23 | End: 2019-05-23

## 2019-05-23 RX ORDER — RIVASTIGMINE 4.6 MG/24H
1 PATCH, EXTENDED RELEASE TRANSDERMAL
Qty: 0 | Refills: 0 | DISCHARGE

## 2019-05-23 RX ADMIN — Medication 81 MILLIGRAM(S): at 12:59

## 2019-05-23 RX ADMIN — BUPROPION HYDROCHLORIDE 300 MILLIGRAM(S): 150 TABLET, EXTENDED RELEASE ORAL at 12:59

## 2019-05-23 RX ADMIN — Medication 100 MILLIGRAM(S): at 21:33

## 2019-05-23 RX ADMIN — ENOXAPARIN SODIUM 40 MILLIGRAM(S): 100 INJECTION SUBCUTANEOUS at 12:59

## 2019-05-23 RX ADMIN — Medication 200 MILLIGRAM(S): at 05:39

## 2019-05-23 RX ADMIN — QUETIAPINE FUMARATE 25 MILLIGRAM(S): 200 TABLET, FILM COATED ORAL at 12:59

## 2019-05-23 RX ADMIN — Medication 100 MILLIGRAM(S): at 05:37

## 2019-05-23 RX ADMIN — Medication 200 MILLIGRAM(S): at 17:18

## 2019-05-23 RX ADMIN — Medication 200 MILLIGRAM(S): at 00:01

## 2019-05-23 RX ADMIN — Medication 200 MILLIGRAM(S): at 12:58

## 2019-05-23 RX ADMIN — Medication 240 MILLIGRAM(S): at 05:37

## 2019-05-23 RX ADMIN — LISINOPRIL 20 MILLIGRAM(S): 2.5 TABLET ORAL at 05:37

## 2019-05-23 RX ADMIN — Medication 20 MILLIGRAM(S): at 05:37

## 2019-05-23 RX ADMIN — Medication 100 MILLIGRAM(S): at 13:00

## 2019-05-23 RX ADMIN — LORATADINE 10 MILLIGRAM(S): 10 TABLET ORAL at 13:00

## 2019-05-23 RX ADMIN — ATORVASTATIN CALCIUM 80 MILLIGRAM(S): 80 TABLET, FILM COATED ORAL at 21:33

## 2019-05-23 NOTE — DISCHARGE NOTE PROVIDER - NSDCFUADDINST_GEN_ALL_CORE_FT
Patient is allowed to fly to [place] after being discharged in order to continue his care  Weight bearing as tolerated for left hip

## 2019-05-23 NOTE — DISCHARGE NOTE PROVIDER - PROVIDER TOKENS
PROVIDER:[TOKEN:[[id number]],FOLLOWUP:[1 week]],FREE:[LAST:[Komal,],FIRST:[Cayden],PHONE:[phone]],FAX:[(   )    -],ADDRESS:[Please call to make an appointment],FOLLOWUP:[1 week]]

## 2019-05-23 NOTE — DISCHARGE NOTE PROVIDER - CARE PROVIDER_API CALL
Sean Huber)  Internal Medicine  [address] [place] [address]  Phone: [phone]  Fax: [phone]  Follow Up Time: 1 week    Sauceda Jonathan  Please call to make an appointment  Phone: [phone]  Fax: (   )    -  Follow Up Time: 1 week

## 2019-05-23 NOTE — DISCHARGE NOTE PROVIDER - HOSPITAL COURSE
HPI:     84y old Male with past medical history of HTN and dyslipidemia presented here after a mechanical fall. He slipped and landed on his left hip.     On presentation only complaint was radiating pain in his left leg starting from his groin to the knee and is not able to move his leg because of pain, no weakness or numbness.      He was hemodynamically stable with no electrolytes abnormalities, CT Pelvis No Cont (05.13.19 @ 18:55) >Nondisplaced fracture of the left anterior acetabulum and Wall thickeningof the terminal ileum, may be postinfectious or inflammatory nature. Further evaluation recommended as outpatient. Xray Wrist 3 Views, Left (05.13.19 @ 15:36) >No evidence of acute fracture.             Prior functional status:         Admission Physical Exam:    General: Comfortable, NAD    HENT:  NC/AT    Neck:  Supple, no JVD     Skin: No Skin Breakdowns    Resp: CTA B/L    CV: RRR, S1S2,     GI: Soft, N/ND, bowel sounds +    Neurology: Follows some commands, impulsive, MMT 4/5 in LEs, 4+/5 in RUE, LUE not tested. Sensation grossly intact to touch         Hospital Course: The patient was admitted to the acute inpatient rehab unit presenting with a decline in functional status. The patient participated in three hours of multidisciplinary therapy 5-6 days per week. The patient was continued on all home medications or equivalent alternatives as deemed appropriate. The patient received prophylactic anticoagulation medication and was monitored closely with no complications.        Discharge functional status:         Discharge disposition: Home with Home Care HPI:     84y old Male with past medical history of HTN and dyslipidemia presented here after a mechanical fall. He slipped and landed on his left hip. On presentation only complaint was radiating pain in his left leg starting from his groin to the knee and is not able to move his leg because of pain, no weakness or numbness.      He was hemodynamically stable with no electrolytes abnormalities, CT Pelvis No Cont (05.13.19 @ 18:55) >Nondisplaced fracture of the left anterior acetabulum and Wall thickeningof the terminal ileum, may be postinfectious or inflammatory nature. Further evaluation recommended as outpatient. Xray Wrist 3 Views, Left (05.13.19 @ 15:36) >No evidence of acute fracture.         Prior functional status: Patient was independent with ADLs        Admission Physical Exam:    General: Comfortable, NAD    HENT:  NC/AT    Neck:  Supple, no JVD     Skin: No Skin Breakdowns    Resp: CTA B/L    CV: RRR, S1S2,     GI: Soft, N/ND, bowel sounds +    Neurology: Follows some commands, impulsive, MMT 4/5 LLE, all else 5/5        Hospital Course: The patient was admitted to the acute inpatient rehab unit presenting with a decline in functional status. The patient participated in three hours of multidisciplinary therapy 5-6 days per week. The patient was continued on all home medications or equivalent alternatives as deemed appropriate. The patient received prophylactic anticoagulation medication and was monitored closely with no complications. Patient was having episode of delirium. 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 MRI was ordered that was also consistent; no intervention needed. Patient was most likely having sundowning episode. Patient was having productive cough, white count and got a 5 day course of Levaquin 750mg. Patient's family request the patient go to his home in [place]. He is discharged and will follow up with doctors there.             Discharge functional status: Patient is supervision/contact guard assist for bed mobility and transfers. Patient ambulated [id number] with rolling walker, contact guard assist        Discharge disposition: Home with outpatient therapy

## 2019-05-23 NOTE — DISCHARGE NOTE PROVIDER - NSDCCPCAREPLAN_GEN_ALL_CORE_FT
PRINCIPAL DISCHARGE DIAGNOSIS  Diagnosis: Acetabular fracture  Assessment and Plan of Treatment: no surgical intervention is indicated for this type of fractures, you need extensive physical therapy and strenghthining pelvic muscles. follow up with      SECONDARY DISCHARGE DIAGNOSES  Diagnosis: Disorder of ilium  Assessment and Plan of Treatment: CT scan showed thickining of the terminal ileum which can be a resolving inflammatory process of malignancy ( abnormal growth ), please follow up with your primary care doctor and gastroenterologist for further work up    Diagnosis: Delirium  Assessment and Plan of Treatment: PRINCIPAL DISCHARGE DIAGNOSIS  Diagnosis: Acetabular fracture  Assessment and Plan of Treatment: -No surgical intervention is indicated for this type of fractures.  -Continue with physical therapy   -Follow      SECONDARY DISCHARGE DIAGNOSES  Diagnosis: Disorder of ilium  Assessment and Plan of Treatment: CT scan showed thickining of the terminal ileum which can be a resolving inflammatory process of malignancy ( abnormal growth ), please follow up with your primary care doctor and gastroenterologist for further work up    Diagnosis: Delirium  Assessment and Plan of Treatment:

## 2019-05-23 NOTE — DISCHARGE NOTE PROVIDER - NSDCFUADDAPPT_GEN_ALL_CORE_FT
-Please follow up with your medicine doctor and an Orthopedic surgeon in [place]  -Please start physical therapy in [place]; if the referral given to you is invalid, visit your primary care doctor who can refer you to therapy

## 2019-05-24 ENCOUNTER — TRANSCRIPTION ENCOUNTER (OUTPATIENT)
Age: 84
End: 2019-05-24

## 2019-05-24 RX ADMIN — Medication 200 MILLIGRAM(S): at 05:32

## 2019-05-24 RX ADMIN — Medication 20 MILLIGRAM(S): at 05:32

## 2019-05-24 RX ADMIN — LORATADINE 10 MILLIGRAM(S): 10 TABLET ORAL at 09:30

## 2019-05-24 RX ADMIN — Medication 100 MILLIGRAM(S): at 05:33

## 2019-05-24 RX ADMIN — Medication 81 MILLIGRAM(S): at 09:30

## 2019-05-24 RX ADMIN — LISINOPRIL 20 MILLIGRAM(S): 2.5 TABLET ORAL at 05:32

## 2019-05-24 RX ADMIN — BUPROPION HYDROCHLORIDE 300 MILLIGRAM(S): 150 TABLET, EXTENDED RELEASE ORAL at 09:30

## 2019-05-24 RX ADMIN — Medication 240 MILLIGRAM(S): at 05:33

## 2019-05-24 NOTE — DISCHARGE NOTE NURSING/CASE MANAGEMENT/SOCIAL WORK - NSDCPNDISPN_GEN_ALL_CORE
Activities of daily living, including home environment that might     exacerbate pain or reduce effectiveness of the pain management plan of care as well as strategies to address these issues/Education provided on the pain management plan of care

## 2019-05-24 NOTE — DISCHARGE NOTE NURSING/CASE MANAGEMENT/SOCIAL WORK - NSDCDPATPORTLINK_GEN_ALL_CORE
You can access the [facility] Patient Portal, offered by [facility], by registering with the following website: [email or web address]
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
## 2019-05-31 DIAGNOSIS — Z79.82 LONG TERM (CURRENT) USE OF ASPIRIN: ICD-10-CM

## 2019-05-31 DIAGNOSIS — R45.1 RESTLESSNESS AND AGITATION: ICD-10-CM
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
## 2019-05-31 DIAGNOSIS — S32.402D UNSPECIFIED FRACTURE OF LEFT ACETABULUM, SUBSEQUENT ENCOUNTER FOR FRACTURE WITH ROUTINE HEALING: ICD-10-CM

## 2019-05-31 DIAGNOSIS — F05 DELIRIUM DUE TO KNOWN PHYSIOLOGICAL CONDITION: ICD-10-CM

## 2019-05-31 DIAGNOSIS — R05 COUGH: ICD-10-CM

## 2019-05-31 DIAGNOSIS — E78.5 HYPERLIPIDEMIA, UNSPECIFIED: ICD-10-CM

## 2019-05-31 DIAGNOSIS — M17.12 UNILATERAL PRIMARY OSTEOARTHRITIS, LEFT KNEE: ICD-10-CM

## 2019-05-31 DIAGNOSIS — F32.9 MAJOR DEPRESSIVE DISORDER, SINGLE EPISODE, UNSPECIFIED: ICD-10-CM

## 2019-05-31 DIAGNOSIS — R06.2 WHEEZING: ICD-10-CM

## 2019-05-31 DIAGNOSIS — F03.90 UNSPECIFIED DEMENTIA WITHOUT BEHAVIORAL DISTURBANCE: ICD-10-CM

## 2019-05-31 DIAGNOSIS — G91.0 COMMUNICATING HYDROCEPHALUS: ICD-10-CM

## 2019-05-31 DIAGNOSIS — D72.828 OTHER ELEVATED WHITE BLOOD CELL COUNT: ICD-10-CM
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
## 2019-05-31 DIAGNOSIS — I10 ESSENTIAL (PRIMARY) HYPERTENSION: ICD-10-CM

## 2019-08-12 ENCOUNTER — EMERGENCY (EMERGENCY)
Facility: HOSPITAL | Age: 84
LOS: 0 days | Discharge: HOME | End: 2019-08-12
Attending: EMERGENCY MEDICINE | Admitting: EMERGENCY MEDICINE
Payer: MEDICARE

## 2019-08-12 VITALS
RESPIRATION RATE: 17 BRPM | HEART RATE: 74 BPM | SYSTOLIC BLOOD PRESSURE: 188 MMHG | TEMPERATURE: 97 F | DIASTOLIC BLOOD PRESSURE: 79 MMHG | OXYGEN SATURATION: 98 %

## 2019-08-12 VITALS
HEIGHT: 64 IN | TEMPERATURE: 98 F | SYSTOLIC BLOOD PRESSURE: 191 MMHG | OXYGEN SATURATION: 98 % | DIASTOLIC BLOOD PRESSURE: 83 MMHG | RESPIRATION RATE: 18 BRPM | WEIGHT: 149.91 LBS | HEART RATE: 75 BPM

## 2019-08-12 DIAGNOSIS — K59.00 CONSTIPATION, UNSPECIFIED: ICD-10-CM

## 2019-08-12 DIAGNOSIS — R33.9 RETENTION OF URINE, UNSPECIFIED: ICD-10-CM

## 2019-08-12 DIAGNOSIS — Z79.899 OTHER LONG TERM (CURRENT) DRUG THERAPY: ICD-10-CM

## 2019-08-12 DIAGNOSIS — E78.5 HYPERLIPIDEMIA, UNSPECIFIED: ICD-10-CM

## 2019-08-12 DIAGNOSIS — Z79.82 LONG TERM (CURRENT) USE OF ASPIRIN: ICD-10-CM

## 2019-08-12 DIAGNOSIS — I10 ESSENTIAL (PRIMARY) HYPERTENSION: ICD-10-CM

## 2019-08-12 DIAGNOSIS — Z98.890 OTHER SPECIFIED POSTPROCEDURAL STATES: Chronic | ICD-10-CM

## 2019-08-12 LAB
ALBUMIN SERPL ELPH-MCNC: 4.3 G/DL — SIGNIFICANT CHANGE UP (ref 3.5–5.2)
ALP SERPL-CCNC: 99 U/L — SIGNIFICANT CHANGE UP (ref 30–115)
ALT FLD-CCNC: 11 U/L — SIGNIFICANT CHANGE UP (ref 0–41)
ANION GAP SERPL CALC-SCNC: 12 MMOL/L — SIGNIFICANT CHANGE UP (ref 7–14)
APPEARANCE UR: CLEAR — SIGNIFICANT CHANGE UP
AST SERPL-CCNC: 16 U/L — SIGNIFICANT CHANGE UP (ref 0–41)
BASOPHILS # BLD AUTO: 0.07 K/UL — SIGNIFICANT CHANGE UP (ref 0–0.2)
BASOPHILS NFR BLD AUTO: 0.5 % — SIGNIFICANT CHANGE UP (ref 0–1)
BILIRUB DIRECT SERPL-MCNC: <0.2 MG/DL — SIGNIFICANT CHANGE UP (ref 0–0.2)
BILIRUB INDIRECT FLD-MCNC: >0.2 MG/DL — SIGNIFICANT CHANGE UP (ref 0.2–1.2)
BILIRUB SERPL-MCNC: 0.4 MG/DL — SIGNIFICANT CHANGE UP (ref 0.2–1.2)
BILIRUB UR-MCNC: NEGATIVE — SIGNIFICANT CHANGE UP
BUN SERPL-MCNC: 16 MG/DL — SIGNIFICANT CHANGE UP (ref 10–20)
CALCIUM SERPL-MCNC: 9.3 MG/DL — SIGNIFICANT CHANGE UP (ref 8.5–10.1)
CHLORIDE SERPL-SCNC: 98 MMOL/L — SIGNIFICANT CHANGE UP (ref 98–110)
CO2 SERPL-SCNC: 25 MMOL/L — SIGNIFICANT CHANGE UP (ref 17–32)
COLOR SPEC: YELLOW — SIGNIFICANT CHANGE UP
CREAT SERPL-MCNC: 0.9 MG/DL — SIGNIFICANT CHANGE UP (ref 0.7–1.5)
DIFF PNL FLD: ABNORMAL
EOSINOPHIL # BLD AUTO: 0.44 K/UL — SIGNIFICANT CHANGE UP (ref 0–0.7)
EOSINOPHIL NFR BLD AUTO: 3.1 % — SIGNIFICANT CHANGE UP (ref 0–8)
EPI CELLS # UR: ABNORMAL /HPF
GLUCOSE SERPL-MCNC: 113 MG/DL — HIGH (ref 70–99)
GLUCOSE UR QL: NEGATIVE MG/DL — SIGNIFICANT CHANGE UP
HCT VFR BLD CALC: 41.5 % — LOW (ref 42–52)
HGB BLD-MCNC: 13.9 G/DL — LOW (ref 14–18)
IMM GRANULOCYTES NFR BLD AUTO: 0.4 % — HIGH (ref 0.1–0.3)
KETONES UR-MCNC: NEGATIVE — SIGNIFICANT CHANGE UP
LACTATE SERPL-SCNC: 1.1 MMOL/L — SIGNIFICANT CHANGE UP (ref 0.5–2.2)
LEUKOCYTE ESTERASE UR-ACNC: NEGATIVE — SIGNIFICANT CHANGE UP
LIDOCAIN IGE QN: 15 U/L — SIGNIFICANT CHANGE UP (ref 7–60)
LYMPHOCYTES # BLD AUTO: 1.28 K/UL — SIGNIFICANT CHANGE UP (ref 1.2–3.4)
LYMPHOCYTES # BLD AUTO: 9.1 % — LOW (ref 20.5–51.1)
MCHC RBC-ENTMCNC: 30.8 PG — SIGNIFICANT CHANGE UP (ref 27–31)
MCHC RBC-ENTMCNC: 33.5 G/DL — SIGNIFICANT CHANGE UP (ref 32–37)
MCV RBC AUTO: 91.8 FL — SIGNIFICANT CHANGE UP (ref 80–94)
MONOCYTES # BLD AUTO: 1.3 K/UL — HIGH (ref 0.1–0.6)
MONOCYTES NFR BLD AUTO: 9.3 % — SIGNIFICANT CHANGE UP (ref 1.7–9.3)
NEUTROPHILS # BLD AUTO: 10.86 K/UL — HIGH (ref 1.4–6.5)
NEUTROPHILS NFR BLD AUTO: 77.6 % — HIGH (ref 42.2–75.2)
NITRITE UR-MCNC: NEGATIVE — SIGNIFICANT CHANGE UP
NRBC # BLD: 0 /100 WBCS — SIGNIFICANT CHANGE UP (ref 0–0)
PH UR: 6.5 — SIGNIFICANT CHANGE UP (ref 5–8)
PLATELET # BLD AUTO: 213 K/UL — SIGNIFICANT CHANGE UP (ref 130–400)
POTASSIUM SERPL-MCNC: 4.4 MMOL/L — SIGNIFICANT CHANGE UP (ref 3.5–5)
POTASSIUM SERPL-SCNC: 4.4 MMOL/L — SIGNIFICANT CHANGE UP (ref 3.5–5)
PROT SERPL-MCNC: 6.3 G/DL — SIGNIFICANT CHANGE UP (ref 6–8)
PROT UR-MCNC: NEGATIVE MG/DL — SIGNIFICANT CHANGE UP
RBC # BLD: 4.52 M/UL — LOW (ref 4.7–6.1)
RBC # FLD: 13.2 % — SIGNIFICANT CHANGE UP (ref 11.5–14.5)
RBC CASTS # UR COMP ASSIST: ABNORMAL /HPF
SODIUM SERPL-SCNC: 135 MMOL/L — SIGNIFICANT CHANGE UP (ref 135–146)
SP GR SPEC: 1.02 — SIGNIFICANT CHANGE UP (ref 1.01–1.03)
UROBILINOGEN FLD QL: 0.2 MG/DL — SIGNIFICANT CHANGE UP (ref 0.2–0.2)
WBC # BLD: 14 K/UL — HIGH (ref 4.8–10.8)
WBC # FLD AUTO: 14 K/UL — HIGH (ref 4.8–10.8)

## 2019-08-12 PROCEDURE — 74177 CT ABD & PELVIS W/CONTRAST: CPT | Mod: 26

## 2019-08-12 PROCEDURE — 99284 EMERGENCY DEPT VISIT MOD MDM: CPT

## 2019-08-12 PROCEDURE — 71045 X-RAY EXAM CHEST 1 VIEW: CPT | Mod: 26

## 2019-08-12 RX ORDER — SODIUM CHLORIDE 9 MG/ML
1000 INJECTION INTRAMUSCULAR; INTRAVENOUS; SUBCUTANEOUS ONCE
Refills: 0 | Status: COMPLETED | OUTPATIENT
Start: 2019-08-12 | End: 2019-08-12

## 2019-08-12 RX ADMIN — SODIUM CHLORIDE 1000 MILLILITER(S): 9 INJECTION INTRAMUSCULAR; INTRAVENOUS; SUBCUTANEOUS at 06:05

## 2019-08-12 NOTE — ED PROVIDER NOTE - OBJECTIVE STATEMENT
84 year old male past medical history of Hypertension and HLD currently being worked up neurologist for parkinson's and was started on carbidopa and levodopa 3 days ago. patient states since starting the medicine he has had decrease in urination and states has had really no out put since yesterday and has not had BM for 2 days. patient states no having pain across lower abd.

## 2019-08-12 NOTE — ED PROVIDER NOTE - ATTENDING CONTRIBUTION TO CARE
I personally evaluated the patient. I reviewed the Resident’s or Physician Assistant’s note (as assigned above), and agree with the findings and plan except as documented in my note.  Chart reviewed. H/O HTN, HLD, hernia repair, presents with lower abdominal pain, inability to urinate and constipation for 2 days. No fever or vomiting. Exam shows alert patient in no distress, HEENT NCAT, lungs clear, RR S1S2, abdomen soft +lower abdominal tenderness +BS, no rebound or guarding, no CCE.

## 2019-08-12 NOTE — ED PROVIDER NOTE - CARE PROVIDER_API CALL
Viola Dobson)  Urology  [address] [place] [address], [address]  [place] [address]  Phone: [phone]  Fax: [phone]  Follow Up Time:

## 2019-08-12 NOTE — ED ADULT NURSE NOTE - NSIMPLEMENTINTERV_GEN_ALL_ED
Implemented All Universal Safety Interventions:  [place] to call system. Call bell, personal items and telephone within reach. Instruct patient to call for assistance. Room bathroom lighting operational. Non-slip footwear when patient is off stretcher. Physically safe environment: no spills, clutter or unnecessary equipment. Stretcher in lowest position, wheels locked, appropriate side rails in place.

## 2019-08-12 NOTE — ED PROVIDER NOTE - NSFOLLOWUPINSTRUCTIONS_ED_ALL_ED_FT
Indwelling Urinary Catheter Care, Adult  An indwelling urinary catheter is a thin, flexible, germ-free (sterile) tube that is placed into the bladder to help drain urine out of the body. The catheter is inserted into the part of the body that drains urine from the bladder (urethra). Urine drains from the catheter into a drainage bag outside of the body.    Taking good care of your catheter will keep it working properly and help to prevent problems from developing.    What are the risks?  Bacteria may get into your bladder and cause a urinary tract infection.  Urine flow can become blocked. This can happen if the catheter is not working correctly, or if you have sediment or a blood clot in your bladder or the catheter.  Tissue near the catheter may become irritated and bleed.  How to wear your catheter and your drainage bag  Supplies needed     Adhesive tape or a leg strap.  Alcohol wipe or soap and water (if you use tape).  A clean towel (if you use tape).  Overnight drainage bag.  Smaller drainage bag (leg bag).  Wearing your catheter and bag     Use adhesive tape or a leg strap to attach your catheter to your leg.    Make sure the catheter is not pulled tight.  If a leg strap gets wet, replace it with a dry one.  If you use adhesive tape:    Use an alcohol wipe or soap and water to wash off any stickiness on your skin where you had tape before.  Use a clean towel to pat-dry the area.  Apply the new tape.      You should have received a large overnight drainage bag and a smaller leg bag that fits underneath clothing.     You may wear the overnight bag at any time, but you should not wear the leg bag at night.  Always wear the leg bag below your knee.  Make sure the overnight drainage bag is always lower than the level of your bladder, but do not let it touch the floor. Before you go to sleep, hang the bag inside a wastebasket that is covered by a clean plastic bag.    How to care for your skin around the catheter  Supplies needed     A clean washcloth.  Water and mild soap.  A clean towel.  Caring for your skin and catheter     Image Image   Every day, use a clean washcloth and soapy water to clean the skin around your catheter.    Wash your hands with soap and water.  Wet a washcloth in warm water and mild soap.  Clean the skin around your urethra.    If you are female:    Use one hand to gently spread the folds of skin around your vagina (labia).  With the washcloth in your other hand, wipe the inner side of your labia on each side. Do this in a front-to-back direction.    If you are male:    Use one hand to pull back any skin that covers the end of your penis (foreskin).  With the washcloth in your other hand, wipe your penis in small circles. Start wiping at the tip of your penis, then move outward from the catheter.      With your free hand, hold the catheter close to where it enters your body. Keep holding the catheter during cleaning so it does not get pulled out.  Use your other hand to clean the catheter with the washcloth.    Only wipe downward on the catheter.  Do not wipe upward toward your body, because that may push bacteria into your urethra and cause infection.    Use a clean towel to pat-dry the catheter and the skin around it. Make sure to wipe off all soap.  Wash your hands with soap and water.    Shower every day. Do not take baths.  Do not use cream, ointment, or lotion on the area where the catheter enters your body, unless your health care provider tells you to do that.  Do not use powders, sprays, or lotions on your genital area.  Check your skin around the catheter every day for signs of infection. Check for:    Redness, swelling, or pain.  Fluid or blood.  Warmth.  Pus or a bad smell.    How to empty the drainage bag  Supplies needed     Rubbing alcohol.  Gauze pad or cotton ball.  Adhesive tape or a leg strap.  Emptying the bag     Empty your drainage bag (your overnight drainage bag or your leg bag) when it is ?–½ full, or at least 2–3 times a day. Do not clean your drainage bag unless your health care provider tells you to do that.    Wash your hands with soap and water.    Detach the drainage bag from your leg.    Hold the drainage bag over the toilet or a clean container. Make sure the drainage bag is lower than your hips and bladder. This stops urine from going back into the tubing and into your bladder.    Open the pour spout at the bottom of the bag.    Empty the urine into the toilet or container. Do not let the pour spout touch any surface. This precaution is important to prevent bacteria from getting in the bag and causing infection.    Apply rubbing alcohol to a gauze pad or cotton ball.    Use the gauze pad or cotton ball to clean the pour spout.    Close the pour spout.    Attach the bag to your leg with adhesive tape or a leg strap.    Wash your hands with soap and water.      How to change the drainage bag  Supplies needed:     Alcohol wipes.  A clean drainage bag.  Adhesive tape or a leg strap.  Changing the bag     Replace your drainage bag with a clean bag once a month. Replace the bag sooner if it leaks, starts to smell bad, or looks dirty.    Wash your hands with soap and water.    Detach the dirty drainage bag from your leg.    Pinch the catheter with your fingers so that urine does not spill out.    Disconnect the catheter tube from the drainage tube at the connection valve. Do not let the tubes touch any surface.    Clean the end of the catheter tube with an alcohol wipe. Use a different alcohol wipe to clean the end of the drainage tube.    Connect the catheter tube to the drainage tube of the clean bag.    Attach the clean bag to your leg with adhesive tape or a leg strap. Avoid attaching the new bag too tightly.    Wash your hands with soap and water.      General instructions  Never pull on your catheter or try to remove it. Pulling can damage your internal tissues.  Always wash your hands before and after you handle your catheter or drainage bag. Use a mild, fragrance-free soap. If soap and water are not available, use hand .  Always make sure there are no twists or bends (kinks) in the catheter tube.  Always make sure there are no leaks in the catheter or drainage bag.  Drink enough fluid to keep your urine pale yellow.  Do not take baths, swim, or use a hot tub.  ImageIf you are female, wipe from front to back after having a bowel movement.  Contact a health care provider if:  Your urine is cloudy.  Your urine smells unusually bad.  Your catheter gets clogged.  Your catheter starts to leak.  Your bladder feels full.  Get help right away if:  You have redness, swelling, or pain where the catheter enters your body.  You have fluid, blood, pus, or a bad smell coming from the area where the catheter enters your body.  The area where the catheter enters your body feels warm to the touch.  You have a fever.  You have pain in your abdomen, legs, lower back, or bladder.  You see blood in the catheter.  Your urine is pink or red.  You have nausea, vomiting, or chills.  Your urine is not draining into the bag.  Your catheter gets pulled out.  Summary  An indwelling urinary catheter is a thin, flexible, germ-free (sterile) tube that is placed into the bladder to help drain urine out of the body.  The catheter is inserted into the part of the body that drains urine from the bladder (urethra).  Take good care of your catheter to keep it working properly and help prevent problems from developing.  Always wash your hands before and after you handle your catheter or drainage bag.  Never pull on your catheter or try to remove it.  This information is not intended to replace advice given to you by your health care provider. Make sure you discuss any questions you have with your health care provider.    Constipation    Constipation is when a person has fewer than three bowel movements a week, has difficulty having a bowel movement, or has stools that are dry, hard, or larger than normal. Other symptoms can include abdominal pain or bloating. As people grow older, constipation is more common. A low-fiber diet, not taking in enough fluids, and taking certain medicines may make constipation worse. Treatment varies but may include dietary modifications (more fiber-rich foods), lifestyle modifications, and possible medications.     SEEK IMMEDIATE MEDICAL CARE IF YOU HAVE THE FOLLOWING SYMPTOMS: bright red blood in your stool, constipation for longer than 4 days, abdominal or rectal pain, unexplained weight loss, or inability to pass gas.

## 2019-08-12 NOTE — ED ADULT NURSE NOTE - NSFALLRSKHARMRISK_ED_ALL_ED
Patient's symptom is most likely muscular origin  Continue therapy to rehabilitate his right shoulder  Will continue to limit his work activities  I will see patient back in 4 weeks for re-evaluation  All patient's questions were answered to his satisfaction  This note is created using dictation transcription  It may contain typographical errors, grammatical errors, improperly dictated words, background noise and other errors 
no

## 2019-08-12 NOTE — ED PROVIDER NOTE - NSTIMEPROVIDERCAREINITIATE_GEN_ER
12-Aug-2019 04:08 negative Alert & oriented; no sensory, motor or coordination deficits, normal reflexes

## 2019-08-12 NOTE — ED PROVIDER NOTE - CLINICAL SUMMARY MEDICAL DECISION MAKING FREE TEXT BOX
Labs noted for WBC 14k. UA +blood. CT abdo enlarged prostate, no acute pathology. Torrez cath inserted with 400 cc output. Will D/C on leg bag and refer to urology. Labs noted for WBC 14k. UA +blood. CT abdo enlarged prostate, no acute pathology. Torrez cath inserted with 400 cc output. Instructed to take Colace. Will D/C on leg bag and refer to urology.

## 2019-08-15 ENCOUNTER — APPOINTMENT (OUTPATIENT)
Dept: UROLOGY | Facility: CLINIC | Age: 84
End: 2019-08-15

## 2019-08-20 ENCOUNTER — EMERGENCY (EMERGENCY)
Facility: HOSPITAL | Age: 84
LOS: 0 days | Discharge: HOME | End: 2019-08-20
Attending: EMERGENCY MEDICINE | Admitting: EMERGENCY MEDICINE
Payer: MEDICARE

## 2019-08-20 VITALS
DIASTOLIC BLOOD PRESSURE: 75 MMHG | WEIGHT: 139.99 LBS | HEART RATE: 59 BPM | SYSTOLIC BLOOD PRESSURE: 152 MMHG | TEMPERATURE: 97 F | OXYGEN SATURATION: 98 % | RESPIRATION RATE: 19 BRPM
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
## 2019-08-20 DIAGNOSIS — E78.00 PURE HYPERCHOLESTEROLEMIA, UNSPECIFIED: ICD-10-CM

## 2019-08-20 DIAGNOSIS — Z98.890 OTHER SPECIFIED POSTPROCEDURAL STATES: Chronic | ICD-10-CM

## 2019-08-20 DIAGNOSIS — T83.091A OTHER MECHANICAL COMPLICATION OF INDWELLING URETHRAL CATHETER, INITIAL ENCOUNTER: ICD-10-CM
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
## 2019-08-20 DIAGNOSIS — I10 ESSENTIAL (PRIMARY) HYPERTENSION: ICD-10-CM
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
## 2019-08-20 PROCEDURE — 99282 EMERGENCY DEPT VISIT SF MDM: CPT

## 2019-08-20 NOTE — ED PROVIDER NOTE - NSFOLLOWUPINSTRUCTIONS_ED_ALL_ED_FT
Urinary Retention    Urinary retention is the inability to completely empty your bladder. This is a common problem in older men, especially with enlarged prostates. If you are sent home with a salgado catheter and a drainage system make sure to keep the drainage bag emptied and lower than your catheter. Keep the salgado catheter in until you follow up with a urologist.    SEEK IMMEDIATE MEDICAL CARE IF YOU DEVELOP THE FOLLOWING SYMPTOMS: the catheter stops draining urine, the catheter falls out, abdominal pain, nausea/vomiting, or chills/fever.

## 2019-08-20 NOTE — ED PROVIDER NOTE - CLINICAL SUMMARY MEDICAL DECISION MAKING FREE TEXT BOX
Patient presented requesting salgado removal. Otherwise afebrile, HD stable. Abd non-tender. Removed salgado in ED (after lengthy discussion with patient as documented), and patient able to void spontaneously in ED. Patient otherwise ambulatory, tolerates PO. Has outpatient urology follow up upcoming. Agrees to follow up as scheduled. Agrees to return to ED for any new or worsening symptoms.

## 2019-08-20 NOTE — ED PROVIDER NOTE - OBJECTIVE STATEMENT
85 y/o male presents requesting salgado catheter to be removed. patient with salgado catheter placed in the ED 2 weeks ago due to urinary retention and constipation. patient with appt with urology this week. patient denies any increased abdominal pain, back pain, gross hematuria, fever. patient c/o penile irritation.

## 2019-08-20 NOTE — ED PROVIDER NOTE - NS ED ROS FT
Review of Systems    Constitutional: (-) fever or chills  respiratory: (-) cough (-) shortness of breath  Cardiovascular: (-) syncope, palpitations or chest pain  Integumentary: (-) rash or painful lymph nodes  Neurological: (-) altered mental status, headache or head injury  : salgado catheter

## 2019-08-20 NOTE — ED PROVIDER NOTE - PROGRESS NOTE DETAILS
advised patient to follow up with urology and to leave salgado catheter in. patient adamant about taking salgado out. will remove but patient understands he needs to spontaneously urinate in the ED patient voided 250 spontaneously

## 2019-08-20 NOTE — ED PROVIDER NOTE - ATTENDING CONTRIBUTION TO CARE
84 year old male, pmhx as documented above, presenting requesting salgado removal. Patient states it was placed 2 weeks ago for urinary retention and has appointment with urology this week. However patient states he cannot tolerate the salgado and states he will be able to urinate without it. Denies fevers, abdominal pain, back pain or any other symptoms or complaints.    Vital Signs: I have reviewed the initial vital signs.  Constitutional: NAD, well-nourished, appears stated age, no acute distress.  HEENT: Airway patent, moist MM, no erythema/swelling/deformity of oral structures. EOMI, PERRLA.  CV: regular rate, regular rhythm, well-perfused extremities, 2+ b/l DP and radial pulses equal.  Lungs: BCTA, no increased WOB.  ABD: NTND, no guarding or rebound, no pulsatile mass, no hernias.   : Salgado in place with yellow urine draining, no signs of infection  MSK: Neck supple, nontender, nl ROM, no stepoff. Chest nontender. Back nontender in TLS spine or to b/l bony structures or flanks. Ext nontender, nl rom, no deformity.   INTEG: Skin warm, dry, no rash.  NEURO: A&Ox3, normal strength, nl sensation throughout, normal speech.   PSYCH: Calm, cooperative, normal affect and interaction.    Explained to patient that removal of salgado puts him at risk for recurrent retention until urology follow up which would lead to second salgado being placed and therefore more traumatic injury to his urethra. Patient verbalizes understanding of this and refuses to keep salgado in. Will remove salgado and do urine trial.

## 2019-08-27 ENCOUNTER — INPATIENT (INPATIENT)
Facility: HOSPITAL | Age: 84
LOS: 3 days | Discharge: HOME | End: 2019-08-31
Attending: STUDENT IN AN ORGANIZED HEALTH CARE EDUCATION/TRAINING PROGRAM | Admitting: STUDENT IN AN ORGANIZED HEALTH CARE EDUCATION/TRAINING PROGRAM
Payer: MEDICARE

## 2019-08-27 VITALS
OXYGEN SATURATION: 98 % | DIASTOLIC BLOOD PRESSURE: 87 MMHG | TEMPERATURE: 101 F | HEIGHT: 66 IN | SYSTOLIC BLOOD PRESSURE: 148 MMHG | HEART RATE: 98 BPM | RESPIRATION RATE: 18 BRPM | WEIGHT: 145.06 LBS

## 2019-08-27 DIAGNOSIS — Z98.890 OTHER SPECIFIED POSTPROCEDURAL STATES: Chronic | ICD-10-CM

## 2019-08-27 DIAGNOSIS — A41.9 SEPSIS, UNSPECIFIED ORGANISM: ICD-10-CM

## 2019-08-27 DIAGNOSIS — T83.511A INFECTION AND INFLAMMATORY REACTION DUE TO INDWELLING URETHRAL CATHETER, INITIAL ENCOUNTER: ICD-10-CM
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
## 2019-08-27 LAB
ALBUMIN SERPL ELPH-MCNC: 4.1 G/DL — SIGNIFICANT CHANGE UP (ref 3.5–5.2)
ALP SERPL-CCNC: 98 U/L — SIGNIFICANT CHANGE UP (ref 30–115)
ALT FLD-CCNC: 14 U/L — SIGNIFICANT CHANGE UP (ref 0–41)
ANION GAP SERPL CALC-SCNC: 12 MMOL/L — SIGNIFICANT CHANGE UP (ref 7–14)
APPEARANCE UR: CLEAR — SIGNIFICANT CHANGE UP
AST SERPL-CCNC: 12 U/L — SIGNIFICANT CHANGE UP (ref 0–41)
BACTERIA # UR AUTO: ABNORMAL
BASOPHILS # BLD AUTO: 0.06 K/UL — SIGNIFICANT CHANGE UP (ref 0–0.2)
BASOPHILS NFR BLD AUTO: 0.3 % — SIGNIFICANT CHANGE UP (ref 0–1)
BILIRUB SERPL-MCNC: 0.5 MG/DL — SIGNIFICANT CHANGE UP (ref 0.2–1.2)
BILIRUB UR-MCNC: NEGATIVE — SIGNIFICANT CHANGE UP
BUN SERPL-MCNC: 14 MG/DL — SIGNIFICANT CHANGE UP (ref 10–20)
CALCIUM SERPL-MCNC: 9.2 MG/DL — SIGNIFICANT CHANGE UP (ref 8.5–10.1)
CHLORIDE SERPL-SCNC: 101 MMOL/L — SIGNIFICANT CHANGE UP (ref 98–110)
CK SERPL-CCNC: 56 U/L — SIGNIFICANT CHANGE UP (ref 0–225)
CO2 SERPL-SCNC: 27 MMOL/L — SIGNIFICANT CHANGE UP (ref 17–32)
COLOR SPEC: YELLOW — SIGNIFICANT CHANGE UP
CREAT SERPL-MCNC: 0.8 MG/DL — SIGNIFICANT CHANGE UP (ref 0.7–1.5)
DIFF PNL FLD: ABNORMAL
EOSINOPHIL # BLD AUTO: 0.13 K/UL — SIGNIFICANT CHANGE UP (ref 0–0.7)
EOSINOPHIL NFR BLD AUTO: 0.7 % — SIGNIFICANT CHANGE UP (ref 0–8)
EPI CELLS # UR: ABNORMAL /HPF
GLUCOSE SERPL-MCNC: 100 MG/DL — HIGH (ref 70–99)
GLUCOSE UR QL: NEGATIVE MG/DL — SIGNIFICANT CHANGE UP
HCT VFR BLD CALC: 39.3 % — LOW (ref 42–52)
HGB BLD-MCNC: 13.2 G/DL — LOW (ref 14–18)
IMM GRANULOCYTES NFR BLD AUTO: 0.6 % — HIGH (ref 0.1–0.3)
KETONES UR-MCNC: 15
LACTATE SERPL-SCNC: 1.3 MMOL/L — SIGNIFICANT CHANGE UP (ref 0.5–2.2)
LEUKOCYTE ESTERASE UR-ACNC: ABNORMAL
LIDOCAIN IGE QN: 11 U/L — SIGNIFICANT CHANGE UP (ref 7–60)
LYMPHOCYTES # BLD AUTO: 0.89 K/UL — LOW (ref 1.2–3.4)
LYMPHOCYTES # BLD AUTO: 4.5 % — LOW (ref 20.5–51.1)
MCHC RBC-ENTMCNC: 31.1 PG — HIGH (ref 27–31)
MCHC RBC-ENTMCNC: 33.6 G/DL — SIGNIFICANT CHANGE UP (ref 32–37)
MCV RBC AUTO: 92.5 FL — SIGNIFICANT CHANGE UP (ref 80–94)
MONOCYTES # BLD AUTO: 1.65 K/UL — HIGH (ref 0.1–0.6)
MONOCYTES NFR BLD AUTO: 8.4 % — SIGNIFICANT CHANGE UP (ref 1.7–9.3)
NEUTROPHILS # BLD AUTO: 16.76 K/UL — HIGH (ref 1.4–6.5)
NEUTROPHILS NFR BLD AUTO: 85.5 % — HIGH (ref 42.2–75.2)
NITRITE UR-MCNC: POSITIVE
NRBC # BLD: 0 /100 WBCS — SIGNIFICANT CHANGE UP (ref 0–0)
PH UR: 5.5 — SIGNIFICANT CHANGE UP (ref 5–8)
PLATELET # BLD AUTO: 238 K/UL — SIGNIFICANT CHANGE UP (ref 130–400)
POTASSIUM SERPL-MCNC: 3.9 MMOL/L — SIGNIFICANT CHANGE UP (ref 3.5–5)
POTASSIUM SERPL-SCNC: 3.9 MMOL/L — SIGNIFICANT CHANGE UP (ref 3.5–5)
PROT SERPL-MCNC: 6.4 G/DL — SIGNIFICANT CHANGE UP (ref 6–8)
PROT UR-MCNC: NEGATIVE MG/DL — SIGNIFICANT CHANGE UP
RBC # BLD: 4.25 M/UL — LOW (ref 4.7–6.1)
RBC # FLD: 13.3 % — SIGNIFICANT CHANGE UP (ref 11.5–14.5)
RBC CASTS # UR COMP ASSIST: SIGNIFICANT CHANGE UP /HPF
SODIUM SERPL-SCNC: 140 MMOL/L — SIGNIFICANT CHANGE UP (ref 135–146)
SP GR SPEC: 1.02 — SIGNIFICANT CHANGE UP (ref 1.01–1.03)
TROPONIN T SERPL-MCNC: <0.01 NG/ML — SIGNIFICANT CHANGE UP
UROBILINOGEN FLD QL: 0.2 MG/DL — SIGNIFICANT CHANGE UP (ref 0.2–0.2)
WBC # BLD: 19.6 K/UL — HIGH (ref 4.8–10.8)
WBC # FLD AUTO: 19.6 K/UL — HIGH (ref 4.8–10.8)
WBC UR QL: ABNORMAL /HPF

## 2019-08-27 PROCEDURE — 99285 EMERGENCY DEPT VISIT HI MDM: CPT

## 2019-08-27 PROCEDURE — 71045 X-RAY EXAM CHEST 1 VIEW: CPT | Mod: 26

## 2019-08-27 RX ORDER — SODIUM CHLORIDE 9 MG/ML
1000 INJECTION INTRAMUSCULAR; INTRAVENOUS; SUBCUTANEOUS ONCE
Refills: 0 | Status: COMPLETED | OUTPATIENT
Start: 2019-08-27 | End: 2019-08-27

## 2019-08-27 RX ORDER — CEFTRIAXONE 500 MG/1
1000 INJECTION, POWDER, FOR SOLUTION INTRAMUSCULAR; INTRAVENOUS ONCE
Refills: 0 | Status: DISCONTINUED | OUTPATIENT
Start: 2019-08-27 | End: 2019-08-28

## 2019-08-27 RX ADMIN — SODIUM CHLORIDE 1000 MILLILITER(S): 9 INJECTION INTRAMUSCULAR; INTRAVENOUS; SUBCUTANEOUS at 23:44

## 2019-08-27 RX ADMIN — SODIUM CHLORIDE 1000 MILLILITER(S): 9 INJECTION INTRAMUSCULAR; INTRAVENOUS; SUBCUTANEOUS at 20:11

## 2019-08-27 NOTE — ED ADULT NURSE NOTE - NSIMPLEMENTINTERV_GEN_ALL_ED
Implemented All Fall with Harm Risk Interventions:  [place] to call system. Call bell, personal items and telephone within reach. Instruct patient to call for assistance. Room bathroom lighting operational. Non-slip footwear when patient is off stretcher. Physically safe environment: no spills, clutter or unnecessary equipment. Stretcher in lowest position, wheels locked, appropriate side rails in place. Provide visual cue, wrist band, yellow gown, etc. Monitor gait and stability. Monitor for mental status changes and reorient to person, place, and time. Review medications for side effects contributing to fall risk. Reinforce activity limits and safety measures with patient and family. Provide visual clues: red socks.

## 2019-08-28 LAB
ANION GAP SERPL CALC-SCNC: 12 MMOL/L — SIGNIFICANT CHANGE UP (ref 7–14)
BASOPHILS # BLD AUTO: 0.11 K/UL — SIGNIFICANT CHANGE UP (ref 0–0.2)
BASOPHILS NFR BLD AUTO: 0.5 % — SIGNIFICANT CHANGE UP (ref 0–1)
BUN SERPL-MCNC: 14 MG/DL — SIGNIFICANT CHANGE UP (ref 10–20)
CALCIUM SERPL-MCNC: 8.7 MG/DL — SIGNIFICANT CHANGE UP (ref 8.5–10.1)
CHLORIDE SERPL-SCNC: 101 MMOL/L — SIGNIFICANT CHANGE UP (ref 98–110)
CO2 SERPL-SCNC: 25 MMOL/L — SIGNIFICANT CHANGE UP (ref 17–32)
CREAT SERPL-MCNC: 0.9 MG/DL — SIGNIFICANT CHANGE UP (ref 0.7–1.5)
EOSINOPHIL # BLD AUTO: 0.13 K/UL — SIGNIFICANT CHANGE UP (ref 0–0.7)
EOSINOPHIL NFR BLD AUTO: 0.6 % — SIGNIFICANT CHANGE UP (ref 0–8)
GLUCOSE SERPL-MCNC: 117 MG/DL — HIGH (ref 70–99)
HCT VFR BLD CALC: 36.2 % — LOW (ref 42–52)
HGB BLD-MCNC: 12.2 G/DL — LOW (ref 14–18)
IMM GRANULOCYTES NFR BLD AUTO: 1 % — HIGH (ref 0.1–0.3)
LYMPHOCYTES # BLD AUTO: 1.07 K/UL — LOW (ref 1.2–3.4)
LYMPHOCYTES # BLD AUTO: 5 % — LOW (ref 20.5–51.1)
MCHC RBC-ENTMCNC: 31.3 PG — HIGH (ref 27–31)
MCHC RBC-ENTMCNC: 33.7 G/DL — SIGNIFICANT CHANGE UP (ref 32–37)
MCV RBC AUTO: 92.8 FL — SIGNIFICANT CHANGE UP (ref 80–94)
MONOCYTES # BLD AUTO: 1.93 K/UL — HIGH (ref 0.1–0.6)
MONOCYTES NFR BLD AUTO: 9 % — SIGNIFICANT CHANGE UP (ref 1.7–9.3)
NEUTROPHILS # BLD AUTO: 17.9 K/UL — HIGH (ref 1.4–6.5)
NEUTROPHILS NFR BLD AUTO: 83.9 % — HIGH (ref 42.2–75.2)
NRBC # BLD: 0 /100 WBCS — SIGNIFICANT CHANGE UP (ref 0–0)
PLATELET # BLD AUTO: 220 K/UL — SIGNIFICANT CHANGE UP (ref 130–400)
POTASSIUM SERPL-MCNC: 3.2 MMOL/L — LOW (ref 3.5–5)
POTASSIUM SERPL-SCNC: 3.2 MMOL/L — LOW (ref 3.5–5)
RBC # BLD: 3.9 M/UL — LOW (ref 4.7–6.1)
RBC # FLD: 13.5 % — SIGNIFICANT CHANGE UP (ref 11.5–14.5)
SODIUM SERPL-SCNC: 138 MMOL/L — SIGNIFICANT CHANGE UP (ref 135–146)
WBC # BLD: 21.35 K/UL — HIGH (ref 4.8–10.8)
WBC # FLD AUTO: 21.35 K/UL — HIGH (ref 4.8–10.8)

## 2019-08-28 PROCEDURE — 99223 1ST HOSP IP/OBS HIGH 75: CPT | Mod: AI

## 2019-08-28 PROCEDURE — 74177 CT ABD & PELVIS W/CONTRAST: CPT | Mod: 26

## 2019-08-28 RX ORDER — TAMSULOSIN HYDROCHLORIDE 0.4 MG/1
0.4 CAPSULE ORAL AT BEDTIME
Refills: 0 | Status: DISCONTINUED | OUTPATIENT
Start: 2019-08-28 | End: 2019-08-31

## 2019-08-28 RX ORDER — LISINOPRIL 2.5 MG/1
20 TABLET ORAL DAILY
Refills: 0 | Status: DISCONTINUED | OUTPATIENT
Start: 2019-08-28 | End: 2019-08-31

## 2019-08-28 RX ORDER — DILTIAZEM HCL 120 MG
240 CAPSULE, EXT RELEASE 24 HR ORAL DAILY
Refills: 0 | Status: DISCONTINUED | OUTPATIENT
Start: 2019-08-28 | End: 2019-08-31

## 2019-08-28 RX ORDER — SODIUM CHLORIDE 9 MG/ML
1000 INJECTION, SOLUTION INTRAVENOUS
Refills: 0 | Status: DISCONTINUED | OUTPATIENT
Start: 2019-08-28 | End: 2019-08-29

## 2019-08-28 RX ORDER — ACETAMINOPHEN 500 MG
650 TABLET ORAL ONCE
Refills: 0 | Status: COMPLETED | OUTPATIENT
Start: 2019-08-28 | End: 2019-08-28

## 2019-08-28 RX ORDER — CEFTRIAXONE 500 MG/1
1000 INJECTION, POWDER, FOR SOLUTION INTRAMUSCULAR; INTRAVENOUS EVERY 24 HOURS
Refills: 0 | Status: DISCONTINUED | OUTPATIENT
Start: 2019-08-28 | End: 2019-08-31

## 2019-08-28 RX ORDER — DOCUSATE SODIUM 100 MG
100 CAPSULE ORAL
Refills: 0 | Status: DISCONTINUED | OUTPATIENT
Start: 2019-08-28 | End: 2019-08-31

## 2019-08-28 RX ORDER — ATORVASTATIN CALCIUM 80 MG/1
80 TABLET, FILM COATED ORAL AT BEDTIME
Refills: 0 | Status: DISCONTINUED | OUTPATIENT
Start: 2019-08-28 | End: 2019-08-31

## 2019-08-28 RX ORDER — ENOXAPARIN SODIUM 100 MG/ML
40 INJECTION SUBCUTANEOUS EVERY 24 HOURS
Refills: 0 | Status: DISCONTINUED | OUTPATIENT
Start: 2019-08-28 | End: 2019-08-31

## 2019-08-28 RX ORDER — QUETIAPINE FUMARATE 200 MG/1
25 TABLET, FILM COATED ORAL DAILY
Refills: 0 | Status: DISCONTINUED | OUTPATIENT
Start: 2019-08-28 | End: 2019-08-31

## 2019-08-28 RX ORDER — CEFTRIAXONE 500 MG/1
1000 INJECTION, POWDER, FOR SOLUTION INTRAMUSCULAR; INTRAVENOUS ONCE
Refills: 0 | Status: COMPLETED | OUTPATIENT
Start: 2019-08-28 | End: 2019-08-28

## 2019-08-28 RX ADMIN — ENOXAPARIN SODIUM 40 MILLIGRAM(S): 100 INJECTION SUBCUTANEOUS at 06:18

## 2019-08-28 RX ADMIN — TAMSULOSIN HYDROCHLORIDE 0.4 MILLIGRAM(S): 0.4 CAPSULE ORAL at 21:21

## 2019-08-28 RX ADMIN — Medication 240 MILLIGRAM(S): at 06:18

## 2019-08-28 RX ADMIN — CEFTRIAXONE 100 MILLIGRAM(S): 500 INJECTION, POWDER, FOR SOLUTION INTRAMUSCULAR; INTRAVENOUS at 21:20

## 2019-08-28 RX ADMIN — SODIUM CHLORIDE 75 MILLILITER(S): 9 INJECTION, SOLUTION INTRAVENOUS at 23:15

## 2019-08-28 RX ADMIN — SODIUM CHLORIDE 75 MILLILITER(S): 9 INJECTION, SOLUTION INTRAVENOUS at 04:06

## 2019-08-28 RX ADMIN — CEFTRIAXONE 100 MILLIGRAM(S): 500 INJECTION, POWDER, FOR SOLUTION INTRAMUSCULAR; INTRAVENOUS at 00:42

## 2019-08-28 RX ADMIN — ATORVASTATIN CALCIUM 80 MILLIGRAM(S): 80 TABLET, FILM COATED ORAL at 21:21

## 2019-08-28 RX ADMIN — Medication 100 MILLIGRAM(S): at 17:35

## 2019-08-28 RX ADMIN — Medication 650 MILLIGRAM(S): at 04:34

## 2019-08-28 RX ADMIN — Medication 650 MILLIGRAM(S): at 05:12

## 2019-08-28 RX ADMIN — LISINOPRIL 20 MILLIGRAM(S): 2.5 TABLET ORAL at 06:18

## 2019-08-28 NOTE — H&P ADULT - NSHPLABSRESULTS_GEN_ALL_CORE
13.2   19.60 )-----------( 238      ( 27 Aug 2019 20:12 )             39.3         140  |  101  |  14  ----------------------------<  100<H>  3.9   |  27  |  0.8    Ca    9.2      27 Aug 2019 20:12    TPro  6.4  /  Alb  4.1  /  TBili  0.5  /  DBili  x   /  AST  12  /  ALT  14  /  AlkPhos  98      Urinalysis Basic - ( 27 Aug 2019 22:35 )    Color: Yellow / Appearance: Clear / S.025 / pH: x  Gluc: x / Ketone: 15  / Bili: Negative / Urobili: 0.2 mg/dL   Blood: x / Protein: Negative mg/dL / Nitrite: Positive   Leuk Esterase: Trace / RBC: 1-2 /HPF / WBC 10-25 /HPF   Sq Epi: x / Non Sq Epi: Occasional /HPF / Bacteria: Many      EXAM:  CT ABDOMEN AND PELVIS IC        PROCEDURE DATE:  2019      IMPRESSION:  No CT evidence of any acute inflammatory process within the abdomen or   pelvis.

## 2019-08-28 NOTE — H&P ADULT - HISTORY OF PRESENT ILLNESS
85 YO M with a PMH of HTN, HLD, and PD? (currently being worked up as out-pt) who presents with c/o generalized weakness for the past x 2-3 days. Associated with burning with urination and increased frequency. Denies any fevers, chills, cough, CP, N/V/D, ABD pain, or back pain. Of note, the pt recently had a salgado pl;aced for urinary retention and it was removed in the [facility]ED on August 20.    In the ED, the pt's UA was positive for UTI. CT-A/P NEG for acute process. Started on IV ABX and IVFs.

## 2019-08-28 NOTE — H&P ADULT - NSHPPHYSICALEXAM_GEN_ALL_CORE
Vital Signs Last 24 Hrs  T(C): 38.3 (27 Aug 2019 20:40), Max: 38.3 (27 Aug 2019 20:40)  T(F): 101 (27 Aug 2019 20:40), Max: 101 (27 Aug 2019 20:40)  HR: 98 (27 Aug 2019 19:32) (98 - 98)  BP: 148/87 (27 Aug 2019 19:32) (148/87 - 148/87)  BP(mean): --  RR: 18 (27 Aug 2019 19:32) (18 - 18)  SpO2: 98% (27 Aug 2019 19:32) (98% - 98%)    General: WN/WD NAD  Neurology: A&Ox3, nonfocal, GUDINO x 4  Eyes: PERRL/EOMI  ENT/Neck: Neck supple, trachea midline, No JVD,  Respiratory: CTA B/L, No wheezing, rales, rhonchi  CV: RRR, S1S2, no murmurs, rubs or gallops  Abdominal: Soft, NT, ND +BS,   Extremities: No edema, + peripheral pulses  Skin: No Rashes, Hematoma, Ecchymosis

## 2019-08-28 NOTE — ED PROVIDER NOTE - CLINICAL SUMMARY MEDICAL DECISION MAKING FREE TEXT BOX
84yM dementia htn hld, being worked up for  parkinson's  low dose carbidopa levodopa , walks  without assistance  , uses walker sometimes,  pw generalized weakness  - unable to  walk on his own since tonight.   in ED  labs  with leukocytosis  to 19,   febrile  + UTI  CT ap  no acute pathology -  pt admitted for further management

## 2019-08-28 NOTE — H&P ADULT - NSHPREVIEWOFSYSTEMS_GEN_ALL_CORE
REVIEW OF SYSTEMS:    CONSTITUTIONAL: No weakness, fevers or chills  EYES/ENT: No visual changes;  No vertigo or throat pain   NECK: No pain or stiffness  RESPIRATORY: No cough, wheezing, hemoptysis; No shortness of breath  CARDIOVASCULAR: No chest pain or palpitations  GASTROINTESTINAL: No abdominal or epigastric pain. No nausea, vomiting, or hematemesis; No diarrhea or constipation. No melena or hematochezia.  GENITOURINARY: See HPI  NEUROLOGICAL: No numbness or weakness  SKIN: No itching, rashes

## 2019-08-28 NOTE — ED PROVIDER NOTE - OBJECTIVE STATEMENT
84 year old female past medical history of Hypertension and HLD comes to emergency room for weakness over the last few days with burning on urination and increased frequency.

## 2019-08-28 NOTE — PHYSICAL THERAPY INITIAL EVALUATION ADULT - IMPAIRMENTS CONTRIBUTING IMPAIRED BED MOBILITY, REHAB EVAL
abnormal muscle tone/decreased strength/impaired coordination/decreased flexibility/impaired motor control

## 2019-08-28 NOTE — PHYSICAL THERAPY INITIAL EVALUATION ADULT - PATIENT/FAMILY/SIGNIFICANT OTHER GOALS STATEMENT, PT EVAL
Patient has been receiving PT for balance issues . Patient is also undergoing trial of parkinson meds recently

## 2019-08-28 NOTE — H&P ADULT - ASSESSMENT
A/P:    1. Generalized weakness 2/2 UTI  -Send urine Culture  -IVFs (LR)  -IV ABX (Ceftriaxone)     2. Mild met alkalosis 2/2 dehydration  -IVFs (LR)  -Repeat BMP in the AM    3. HX of HTN/HLD  -Restart home meds    GI and DVT PPX A/P:    1. Generalized weakness 2/2 UTI  -Send urine Culture  -IVFs (LR)  -IV ABX (Ceftriaxone)   -Physical therapy consult     2. Leukocytosis 2/2 above    3. Mild met alkalosis 2/2 dehydration  -IVFs (LR)  -Repeat BMP in the AM    4. HX of HTN/HLD  -Restart home meds    GI and DVT PPX

## 2019-08-28 NOTE — PHYSICAL THERAPY INITIAL EVALUATION ADULT - IMPAIRED TRANSFERS: SIT/STAND, REHAB EVAL
impaired coordination/abnormal muscle tone/impaired balance/decreased strength/decreased flexibility

## 2019-08-29 DIAGNOSIS — N39.0 URINARY TRACT INFECTION, SITE NOT SPECIFIED: ICD-10-CM

## 2019-08-29 LAB
ANION GAP SERPL CALC-SCNC: 12 MMOL/L — SIGNIFICANT CHANGE UP (ref 7–14)
BASOPHILS # BLD AUTO: 0.06 K/UL — SIGNIFICANT CHANGE UP (ref 0–0.2)
BASOPHILS NFR BLD AUTO: 0.4 % — SIGNIFICANT CHANGE UP (ref 0–1)
BUN SERPL-MCNC: 11 MG/DL — SIGNIFICANT CHANGE UP (ref 10–20)
CALCIUM SERPL-MCNC: 8.6 MG/DL — SIGNIFICANT CHANGE UP (ref 8.5–10.1)
CHLORIDE SERPL-SCNC: 102 MMOL/L — SIGNIFICANT CHANGE UP (ref 98–110)
CO2 SERPL-SCNC: 26 MMOL/L — SIGNIFICANT CHANGE UP (ref 17–32)
CREAT SERPL-MCNC: 0.8 MG/DL — SIGNIFICANT CHANGE UP (ref 0.7–1.5)
EOSINOPHIL # BLD AUTO: 0.35 K/UL — SIGNIFICANT CHANGE UP (ref 0–0.7)
EOSINOPHIL NFR BLD AUTO: 2.1 % — SIGNIFICANT CHANGE UP (ref 0–8)
GLUCOSE SERPL-MCNC: 84 MG/DL — SIGNIFICANT CHANGE UP (ref 70–99)
HCT VFR BLD CALC: 35.9 % — LOW (ref 42–52)
HGB BLD-MCNC: 12 G/DL — LOW (ref 14–18)
IMM GRANULOCYTES NFR BLD AUTO: 0.6 % — HIGH (ref 0.1–0.3)
LYMPHOCYTES # BLD AUTO: 1.19 K/UL — LOW (ref 1.2–3.4)
LYMPHOCYTES # BLD AUTO: 7.1 % — LOW (ref 20.5–51.1)
MCHC RBC-ENTMCNC: 30.6 PG — SIGNIFICANT CHANGE UP (ref 27–31)
MCHC RBC-ENTMCNC: 33.4 G/DL — SIGNIFICANT CHANGE UP (ref 32–37)
MCV RBC AUTO: 91.6 FL — SIGNIFICANT CHANGE UP (ref 80–94)
MONOCYTES # BLD AUTO: 1.73 K/UL — HIGH (ref 0.1–0.6)
MONOCYTES NFR BLD AUTO: 10.3 % — HIGH (ref 1.7–9.3)
NEUTROPHILS # BLD AUTO: 13.39 K/UL — HIGH (ref 1.4–6.5)
NEUTROPHILS NFR BLD AUTO: 79.5 % — HIGH (ref 42.2–75.2)
NRBC # BLD: 0 /100 WBCS — SIGNIFICANT CHANGE UP (ref 0–0)
PLATELET # BLD AUTO: 208 K/UL — SIGNIFICANT CHANGE UP (ref 130–400)
POTASSIUM SERPL-MCNC: 3.3 MMOL/L — LOW (ref 3.5–5)
POTASSIUM SERPL-SCNC: 3.3 MMOL/L — LOW (ref 3.5–5)
RBC # BLD: 3.92 M/UL — LOW (ref 4.7–6.1)
RBC # FLD: 13.5 % — SIGNIFICANT CHANGE UP (ref 11.5–14.5)
SODIUM SERPL-SCNC: 140 MMOL/L — SIGNIFICANT CHANGE UP (ref 135–146)
WBC # BLD: 16.82 K/UL — HIGH (ref 4.8–10.8)
WBC # FLD AUTO: 16.82 K/UL — HIGH (ref 4.8–10.8)

## 2019-08-29 PROCEDURE — 99233 SBSQ HOSP IP/OBS HIGH 50: CPT

## 2019-08-29 RX ORDER — POTASSIUM CHLORIDE 20 MEQ
40 PACKET (EA) ORAL ONCE
Refills: 0 | Status: COMPLETED | OUTPATIENT
Start: 2019-08-29 | End: 2019-08-29

## 2019-08-29 RX ADMIN — Medication 240 MILLIGRAM(S): at 05:33

## 2019-08-29 RX ADMIN — Medication 40 MILLIEQUIVALENT(S): at 11:51

## 2019-08-29 RX ADMIN — TAMSULOSIN HYDROCHLORIDE 0.4 MILLIGRAM(S): 0.4 CAPSULE ORAL at 21:38

## 2019-08-29 RX ADMIN — Medication 100 MILLIGRAM(S): at 17:24

## 2019-08-29 RX ADMIN — Medication 40 MILLIEQUIVALENT(S): at 08:28

## 2019-08-29 RX ADMIN — CEFTRIAXONE 100 MILLIGRAM(S): 500 INJECTION, POWDER, FOR SOLUTION INTRAMUSCULAR; INTRAVENOUS at 21:35

## 2019-08-29 RX ADMIN — LISINOPRIL 20 MILLIGRAM(S): 2.5 TABLET ORAL at 05:33

## 2019-08-29 RX ADMIN — ENOXAPARIN SODIUM 40 MILLIGRAM(S): 100 INJECTION SUBCUTANEOUS at 05:33

## 2019-08-29 RX ADMIN — ATORVASTATIN CALCIUM 80 MILLIGRAM(S): 80 TABLET, FILM COATED ORAL at 21:38

## 2019-08-29 RX ADMIN — Medication 100 MILLIGRAM(S): at 05:33

## 2019-08-29 NOTE — CONSULT NOTE ADULT - PROBLEM SELECTOR RECOMMENDATION 9
acute illness  recent salgado   CT - prostatitis     wbc lower on IV Rocephin   follow urine cx  at least 14 days of abx

## 2019-08-29 NOTE — CONSULT NOTE ADULT - SUBJECTIVE AND OBJECTIVE BOX
JOSEPH POPE  84y, Male  Allergy: No Known Allergies      CHIEF COMPLAINT: Generalized weakness (28 Aug 2019 13:31)      HPI:  85 YO M with a PMH of HTN, HLD, and PD? (currently being worked up as out-pt) who presents with c/o generalized weakness for the past x 2-3 days. Associated with burning with urination and increased frequency. Denies any fevers, chills, cough, CP, N/V/D, ABD pain, or back pain. Of note, the pt recently had a salgado pl;aced for urinary retention and [facility] [place] [facility] .    In the ED, the pt's UA was positive for UTI. CT-A/P NEG for acute process. Started on IV ABX and IVFs. (28 Aug 2019 01:53)    FAMILY HISTORY:    PAST MEDICAL & SURGICAL HISTORY:  High cholesterol  Hypertension, unspecified type  H/O hernia repair      Substance Use (X  ) never used  (  ) IVDU (  ) Other:  Tobacco Usage:  (   ) never smoked   (   ) former smoker   (  X ) current smoker   Alcohol Usage: (   ) social  (   ) daily use ( X  ) denies  Sexual History: not relevant     ROS  10 system review- neg     VITALS:  T(F): 96.4, Max: 99 (19 @ 22:18)  HR: 60  BP: 138/83  RR: 16Vital Signs Last 24 Hrs  T(C): 35.8 (29 Aug 2019 05:26), Max: 37.2 (28 Aug 2019 22:18)  T(F): 96.4 (29 Aug 2019 05:26), Max: 99 (28 Aug 2019 22:18)  HR: 60 (29 Aug 2019 05:26) (60 - 82)  BP: 138/83 (29 Aug 2019 05:26) (112/58 - 139/63)  BP(mean): --  RR: 16 (29 Aug 2019 05:26) (16 - 16)  SpO2: --    PHYSICAL EXAM:  Gen: NAD, resting in bed  HEENT: Normocephalic, atraumatic  Neck: supple, no lymphadenopathy  CV: s1 s 2+   Lungs: clear  Abdomen: Soft, BS present. non tender  Ext: Warm, well perfused  Neuro: non focal, awake. cannot say why he is in the hospital   Skin: no rash, no erythema    TESTS & MEASUREMENTS:                        12.0   16.82 )-----------( 208      ( 29 Aug 2019 06:47 )             35.9         140  |  102  |  11  ----------------------------<  84  3.3<L>   |  26  |  0.8    Ca    8.6      29 Aug 2019 06:47    TPro  6.4  /  Alb  4.1  /  TBili  0.5  /  DBili  x   /  AST  12  /  ALT  14  /  AlkPhos  98      eGFR if : 95 mL/min/1.73M2 (19 @ 06:47)  eGFR if Non African American: 82 mL/min/1.73M2 (19 @ 06:47)  eGFR if Non African American: 78 mL/min/1.73M2 (19 @ 14:50)  eGFR if : 91 mL/min/1.73M2 (19 @ 14:50)    LIVER FUNCTIONS - ( 27 Aug 2019 20:12 )  Alb: 4.1 g/dL / Pro: 6.4 g/dL / ALK PHOS: 98 U/L / ALT: 14 U/L / AST: 12 U/L / GGT: x           Urinalysis Basic - ( 27 Aug 2019 22:35 )    Color: Yellow / Appearance: Clear / S.025 / pH: x  Gluc: x / Ketone: 15  / Bili: Negative / Urobili: 0.2 mg/dL   Blood: x / Protein: Negative mg/dL / Nitrite: Positive   Leuk Esterase: Trace / RBC: 1-2 /HPF / WBC 10-25 /HPF   Sq Epi: x / Non Sq Epi: Occasional /HPF / Bacteria: Many        Culture - Blood (collected 19 @ 20:12)  Source: .Blood Blood  Preliminary Report (19 @ 05:01):    No growth to date.    Culture - Blood (collected 19 @ 20:12)  Source: .Blood Blood  Preliminary Report (19 @ 05:01):    No growth to date.        Lactate, Blood: 1.3 mmol/L (19 @ 20:12)      INFECTIOUS DISEASES TESTING      RADIOLOGY & ADDITIONAL TESTS:  I have personally reviewed the last Chest xray  CXR      CT  CT Abdomen and Pelvis w/ IV Cont:   EXAM:  CT ABDOMEN AND PELVIS IC            PROCEDURE DATE:  2019            INTERPRETATION:  CLINICAL HISTORY: Fever, UTI.    TECHNIQUE: Contiguous axial CT images were obtained from the lower chest   to the pubic symphysis following administration of Optiray intravenous   contrast. Oral contrast was not administered. Reformatted images in the   coronal and sagittal planes were acquired.    COMPARISON: CT abdomen and pelvis dated 2019.      FINDINGS:    LOWER CHEST: Unremarkable.    HEPATOBILIARY: Redemonstrated is a 2.3 cm left hepatic lobe hemangioma.   Additional subcentimeter hypodensity is too small to further characterize.    SPLEEN: Unremarkable.    PANCREAS: Unremarkable.    ADRENAL GLANDS: Unremarkable.    KIDNEYS: No hydronephrosis bilaterally. Renal contrast enhancement is   symmetric. 4 mm nonobstructing left renal calculus versus vascular   calcification.    ABDOMINOPELVIC NODES: No lymphadenopathy.    PELVIC ORGANS: Prostatomegaly.    PERITONEUM/MESENTERY/BOWEL:Sigmoid diverticulosis without evidence of   acute diverticulitis. No pneumoperitoneum. No abdominopelvic ascites. No   evidence of bowel obstruction. The appendix is not definitely seen. There   is no evidence of pericecal inflammation. Bilateral inguinal hernia   repair.    BONES/SOFT TISSUES: No acute osseous abnormality. Stable degenerative   changes of the lumbar spine. Chronic T12 vertebral body compression   deformity.     OTHER: Arteriosclerotic calcifications of the abdominal aorta.      IMPRESSION:    No CT evidence of any acute inflammatory process within the abdomen or   pelvis.    ADDITIONAL FINDINGS AFTER ATTENDING REVIEW:    There is haziness and fat stranding surrounding the seminal vesicles and   prostate gland consistent with prostatitis.    There is nonspecific wall thickening and mucosal hyperenhancement of a   short segment of distal ileum with an adjacent borderline enlarged lymph   node measuring 11 x 9 mm. Findings are similar to prior CT pelvis dated   2019              HIEN WHITNEY M.D., RESIDENT RADIOLOGIST  This document has been electronically signed.  KEV SHELBY M.D., ATTENDING RADIOLOGIST  This document has been electronically signed. Aug 28 2019  2:37AM             (19 @ 00:34)      CARDIOLOGY TESTING      MEDICATIONS  atorvastatin 80  cefTRIAXone   IVPB 1000  diltiazem     docusate sodium 100  enoxaparin Injectable 40  lactated ringers. 1000  lisinopril 20  propranolol 10  tamsulosin 0.4      ANTIBIOTICS:  cefTRIAXone   IVPB 1000 milliGRAM(s) IV Intermittent every 24 hours

## 2019-08-30 DIAGNOSIS — A41.9 SEPSIS, UNSPECIFIED ORGANISM: ICD-10-CM

## 2019-08-30 LAB
-  AMIKACIN: SIGNIFICANT CHANGE UP
-  AMPICILLIN/SULBACTAM: SIGNIFICANT CHANGE UP
-  AMPICILLIN: SIGNIFICANT CHANGE UP
-  AZTREONAM: SIGNIFICANT CHANGE UP
-  CEFAZOLIN: SIGNIFICANT CHANGE UP
-  CEFEPIME: SIGNIFICANT CHANGE UP
-  CEFOXITIN: SIGNIFICANT CHANGE UP
-  CEFTRIAXONE: SIGNIFICANT CHANGE UP
-  CIPROFLOXACIN: SIGNIFICANT CHANGE UP
-  GENTAMICIN: SIGNIFICANT CHANGE UP
-  IMIPENEM: SIGNIFICANT CHANGE UP
-  LEVOFLOXACIN: SIGNIFICANT CHANGE UP
-  MEROPENEM: SIGNIFICANT CHANGE UP
-  NITROFURANTOIN: SIGNIFICANT CHANGE UP
-  PIPERACILLIN/TAZOBACTAM: SIGNIFICANT CHANGE UP
-  TIGECYCLINE: SIGNIFICANT CHANGE UP
-  TOBRAMYCIN: SIGNIFICANT CHANGE UP
-  TRIMETHOPRIM/SULFAMETHOXAZOLE: SIGNIFICANT CHANGE UP
ANION GAP SERPL CALC-SCNC: 13 MMOL/L — SIGNIFICANT CHANGE UP (ref 7–14)
BASOPHILS # BLD AUTO: 0.06 K/UL — SIGNIFICANT CHANGE UP (ref 0–0.2)
BASOPHILS NFR BLD AUTO: 0.5 % — SIGNIFICANT CHANGE UP (ref 0–1)
BUN SERPL-MCNC: 10 MG/DL — SIGNIFICANT CHANGE UP (ref 10–20)
CALCIUM SERPL-MCNC: 8.9 MG/DL — SIGNIFICANT CHANGE UP (ref 8.5–10.1)
CHLORIDE SERPL-SCNC: 100 MMOL/L — SIGNIFICANT CHANGE UP (ref 98–110)
CO2 SERPL-SCNC: 27 MMOL/L — SIGNIFICANT CHANGE UP (ref 17–32)
CREAT SERPL-MCNC: 0.8 MG/DL — SIGNIFICANT CHANGE UP (ref 0.7–1.5)
CULTURE RESULTS: SIGNIFICANT CHANGE UP
EOSINOPHIL # BLD AUTO: 0.37 K/UL — SIGNIFICANT CHANGE UP (ref 0–0.7)
EOSINOPHIL NFR BLD AUTO: 3.3 % — SIGNIFICANT CHANGE UP (ref 0–8)
GLUCOSE SERPL-MCNC: 89 MG/DL — SIGNIFICANT CHANGE UP (ref 70–99)
HCT VFR BLD CALC: 37.3 % — LOW (ref 42–52)
HGB BLD-MCNC: 12.7 G/DL — LOW (ref 14–18)
IMM GRANULOCYTES NFR BLD AUTO: 0.4 % — HIGH (ref 0.1–0.3)
LYMPHOCYTES # BLD AUTO: 0.79 K/UL — LOW (ref 1.2–3.4)
LYMPHOCYTES # BLD AUTO: 7.1 % — LOW (ref 20.5–51.1)
MAGNESIUM SERPL-MCNC: 1.7 MG/DL — LOW (ref 1.8–2.4)
MCHC RBC-ENTMCNC: 31.1 PG — HIGH (ref 27–31)
MCHC RBC-ENTMCNC: 34 G/DL — SIGNIFICANT CHANGE UP (ref 32–37)
MCV RBC AUTO: 91.2 FL — SIGNIFICANT CHANGE UP (ref 80–94)
METHOD TYPE: SIGNIFICANT CHANGE UP
MONOCYTES # BLD AUTO: 1.22 K/UL — HIGH (ref 0.1–0.6)
MONOCYTES NFR BLD AUTO: 10.9 % — HIGH (ref 1.7–9.3)
NEUTROPHILS # BLD AUTO: 8.67 K/UL — HIGH (ref 1.4–6.5)
NEUTROPHILS NFR BLD AUTO: 77.8 % — HIGH (ref 42.2–75.2)
NRBC # BLD: 0 /100 WBCS — SIGNIFICANT CHANGE UP (ref 0–0)
ORGANISM # SPEC MICROSCOPIC CNT: SIGNIFICANT CHANGE UP
ORGANISM # SPEC MICROSCOPIC CNT: SIGNIFICANT CHANGE UP
PLATELET # BLD AUTO: 222 K/UL — SIGNIFICANT CHANGE UP (ref 130–400)
POTASSIUM SERPL-MCNC: 4.1 MMOL/L — SIGNIFICANT CHANGE UP (ref 3.5–5)
POTASSIUM SERPL-SCNC: 4.1 MMOL/L — SIGNIFICANT CHANGE UP (ref 3.5–5)
RBC # BLD: 4.09 M/UL — LOW (ref 4.7–6.1)
RBC # FLD: 13.3 % — SIGNIFICANT CHANGE UP (ref 11.5–14.5)
SODIUM SERPL-SCNC: 140 MMOL/L — SIGNIFICANT CHANGE UP (ref 135–146)
SPECIMEN SOURCE: SIGNIFICANT CHANGE UP
WBC # BLD: 11.15 K/UL — HIGH (ref 4.8–10.8)
WBC # FLD AUTO: 11.15 K/UL — HIGH (ref 4.8–10.8)

## 2019-08-30 PROCEDURE — 99233 SBSQ HOSP IP/OBS HIGH 50: CPT

## 2019-08-30 PROCEDURE — 76700 US EXAM ABDOM COMPLETE: CPT | Mod: 26

## 2019-08-30 RX ORDER — POLYETHYLENE GLYCOL 3350 17 G/17G
17 POWDER, FOR SOLUTION ORAL DAILY
Refills: 0 | Status: DISCONTINUED | OUTPATIENT
Start: 2019-08-30 | End: 2019-08-31

## 2019-08-30 RX ORDER — MAGNESIUM SULFATE 500 MG/ML
2 VIAL (ML) INJECTION ONCE
Refills: 0 | Status: COMPLETED | OUTPATIENT
Start: 2019-08-30 | End: 2019-08-30

## 2019-08-30 RX ORDER — SENNA PLUS 8.6 MG/1
2 TABLET ORAL AT BEDTIME
Refills: 0 | Status: DISCONTINUED | OUTPATIENT
Start: 2019-08-30 | End: 2019-08-31

## 2019-08-30 RX ADMIN — CEFTRIAXONE 100 MILLIGRAM(S): 500 INJECTION, POWDER, FOR SOLUTION INTRAMUSCULAR; INTRAVENOUS at 22:05

## 2019-08-30 RX ADMIN — SENNA PLUS 2 TABLET(S): 8.6 TABLET ORAL at 22:05

## 2019-08-30 RX ADMIN — Medication 100 MILLIGRAM(S): at 17:23

## 2019-08-30 RX ADMIN — Medication 100 MILLIGRAM(S): at 05:36

## 2019-08-30 RX ADMIN — LISINOPRIL 20 MILLIGRAM(S): 2.5 TABLET ORAL at 05:35

## 2019-08-30 RX ADMIN — ENOXAPARIN SODIUM 40 MILLIGRAM(S): 100 INJECTION SUBCUTANEOUS at 05:36

## 2019-08-30 RX ADMIN — TAMSULOSIN HYDROCHLORIDE 0.4 MILLIGRAM(S): 0.4 CAPSULE ORAL at 22:05

## 2019-08-30 RX ADMIN — Medication 50 GRAM(S): at 14:33

## 2019-08-30 RX ADMIN — ATORVASTATIN CALCIUM 80 MILLIGRAM(S): 80 TABLET, FILM COATED ORAL at 22:05

## 2019-08-30 RX ADMIN — Medication 240 MILLIGRAM(S): at 05:35

## 2019-08-30 NOTE — PROGRESS NOTE ADULT - SUBJECTIVE AND OBJECTIVE BOX
Progress Note:  Provider Speciality                            Hospitalist      JOSEPH POPE [id number] 84y Male     CHIEF PRESENTING COMPLAINT:  Patient is a 84y old  Male who presents with a chief complaint of Generalized weakness (28 Aug 2019 01:53)        SUBJECTIVE:  Patient was seen and examined at bedside.   Reports improvement of gen. weakness /no dysuria/ wife and patient upset about bed alarm and does not want it    No significant overnight events reported.     HISTORY OF PRESENTING ILLNESS:  HPI:  85 YO M with a PMH of HTN, HLD, and PD? (currently being worked up as out-pt) who presents with c/o generalized weakness for the past x 2-3 days. Associated with burning with urination and increased frequency. Denies any fevers, chills, cough, CP, N/V/D, ABD pain, or back pain. Of note, the pt recently had a salgado pl;[facility] retention [facility]    In the ED, the pt's UA was positive for UTI. CT-A/P NEG for acute process. Started on IV ABX and IVFs. (28 Aug 2019 01:53)        REVIEW OF SYSTEMS:    At least 10 systems were reviewed in ROS. All systems reviewed  are within normal limits except for the complaints as described in Subjective.    PAST MEDICAL & SURGICAL HISTORY:  PAST MEDICAL & SURGICAL HISTORY:  High cholesterol  Hypertension, unspecified type  H/O hernia repair          VITAL SIGNS:  Vital Signs Last 24 Hrs  T(C): 35.6 (29 Aug 2019 14:41), Max: 37.2 (28 Aug 2019 22:18)  T(F): 96 (29 Aug 2019 14:41), Max: 99 (28 Aug 2019 22:18)  HR: 61 (29 Aug 2019 14:41) (60 - 82)  BP: 167/68 (29 Aug 2019 14:41) (112/58 - 167/68)  BP(mean): --  RR: 16 (29 Aug 2019 14:41) (16 - 16)  SpO2: --      PHYSICAL EXAMINATION:    General: Awake, alert , Not in acute distress  HEENT:   EOMI, NO JVD, atraumatic  Heart: S1+S2 audible, no murmur  Lungs: bilateral  fair air entry, no wheezing, no crepitations.  Abdomen: Soft, non-tender, non-distended   CNS: AAO  , no focal deficits.  Extremities:  No edema            CONSULTS:  Consultant(s) Notes Reviewed by me.   Care Discussed with Consultants/Other Providers where required.        MEDICATIONS:  MEDICATIONS  (STANDING):  atorvastatin 80 milliGRAM(s) Oral at bedtime  cefTRIAXone   IVPB 1000 milliGRAM(s) IV Intermittent every 24 hours  diltiazem    milliGRAM(s) Oral daily  enoxaparin Injectable 40 milliGRAM(s) SubCutaneous every 24 hours  lactated ringers. 1000 milliLiter(s) (75 mL/Hr) IV Continuous <Continuous>  lisinopril 20 milliGRAM(s) Oral daily  propranolol 10 milliGRAM(s) Oral every 12 hours  tamsulosin 0.4 milliGRAM(s) Oral at bedtime    MEDICATIONS  (PRN):  QUEtiapine 25 milliGRAM(s) Oral daily PRN agitation      LABOROTORY DATA/MICROBIOLOGY/I & O's:                        13.2   19.60 )-----------( 238      ( 27 Aug 2019 20:12 )             39.3         140  |  101  |  14  ----------------------------<  100<H>  3.9   |  27  |  0.8    Ca    9.2      27 Aug 2019 20:12    TPro  6.4  /  Alb  4.1  /  TBili  0.5  /  DBili  x   /  AST  12  /  ALT  14  /  AlkPhos  98        Urinalysis Basic - ( 27 Aug 2019 22:35 )    Color: Yellow / Appearance: Clear / S.025 / pH: x  Gluc: x / Ketone: 15  / Bili: Negative / Urobili: 0.2 mg/dL   Blood: x / Protein: Negative mg/dL / Nitrite: Positive   Leuk Esterase: Trace / RBC: 1-2 /HPF / WBC 10-25 /HPF   Sq Epi: x / Non Sq Epi: Occasional /HPF / Bacteria: Many      CAPILLARY BLOOD GLUCOSE            Urinalysis Basic - ( 27 Aug 2019 22:35 )    Color: Yellow / Appearance: Clear / S.025 / pH: x  Gluc: x / Ketone: 15  / Bili: Negative / Urobili: 0.2 mg/dL   Blood: x / Protein: Negative mg/dL / Nitrite: Positive   Leuk Esterase: Trace / RBC: 1-2 /HPF / WBC 10-25 /HPF   Sq Epi: x / Non Sq Epi: Occasional /HPF / Bacteria: Many            19 @ 07:01  -  19 @ 07:00  --------------------------------------------------------  IN: 0 mL / OUT: 200 mL / NET: -200 mL        ASSESSMENT:  85 YO M with a PMH of HTN, HLD, and PD? (currently being worked up as out-pt) who presents with c/o generalized weakness for the past x 2-3 days.    1. Generalized weakness 2/2 UTI with prostatitis on CT abdomen/pelvis:  afebrile today, wbc improving   ID consult appreciated - c/w ABX for total 14 days   - c/w IV rocephin until UCX results   - fu UCX- pending dc   - dc IVFs (LR)  -Physical therapy consult - walks with a walker     2. Leukocytosis 2/2 above:  trending down /trend daily     3. Mild met alkalosis 2/2 dehydration  - dc IVFs (LR)  -resolved     4. HX of HTN/HLD  -c/w statin/ ditiazem/ lisinopril     5- BPH:  c/w flomax         PT eval     dvt ppx         Progress note handoff:   pending: clinical improvement/ UCX   disposition: home with home care   discussion: with patient and wife in detail - satisfied
Progress Note:  Provider Speciality                            Hospitalist      JOSEPH POPE [id number] 84y Male     CHIEF PRESENTING COMPLAINT:  Patient is a 84y old  Male who presents with a chief complaint of Generalized weakness (28 Aug 2019 01:53)        SUBJECTIVE:  Patient was seen and examined at bedside.   awake, alert, no new complaints / awaiting UCX sensitivities   No significant overnight events reported.     HISTORY OF PRESENTING ILLNESS:  HPI:  85 YO M with a PMH of HTN, HLD, and PD? (currently being worked up as out-pt) who presents with c/o generalized weakness for the past x 2-3 days. Associated with burning with urination and increased frequency. Denies any fevers, chills, cough, CP, N/V/D, ABD pain, or back pain. Of note, the pt recently had [facility] pl;[facility]    In the ED, the pt's UA was positive for UTI. CT-A/P NEG for acute process. Started on IV ABX and IVFs. (28 Aug 2019 01:53)        REVIEW OF SYSTEMS:    At least 10 systems were reviewed in ROS. All systems reviewed  are within normal limits except for the complaints as described in Subjective.    PAST MEDICAL & SURGICAL HISTORY:  PAST MEDICAL & SURGICAL HISTORY:  High cholesterol  Hypertension, unspecified type  H/O hernia repair          VITAL SIGNS:  Vital Signs Last 24 Hrs  T(C): 35.8 (30 Aug 2019 14:14), Max: 37.3 (29 Aug 2019 21:32)  T(F): 96.5 (30 Aug 2019 14:14), Max: 99.1 (29 Aug 2019 21:32)  HR: 67 (30 Aug 2019 14:14) (66 - 71)  BP: 150/70 (30 Aug 2019 14:14) (111/60 - 173/73)  BP(mean): --  RR: 16 (30 Aug 2019 14:14) (16 - 16)  SpO2: --      PHYSICAL EXAMINATION:    General: Awake, alert , Not in acute distress  HEENT:   EOMI, NO JVD, atraumatic  Heart: S1+S2 audible, no murmur  Lungs: bilateral  fair air entry, no wheezing, no crepitations.  Abdomen: Soft, non-tender, non-distended   CNS: AAO  , no focal deficits.  Extremities:  No edema            CONSULTS:  Consultant(s) Notes Reviewed by me.   Care Discussed with Consultants/Other Providers where required.        MEDICATIONS:  MEDICATIONS  (STANDING):  atorvastatin 80 milliGRAM(s) Oral at bedtime  cefTRIAXone   IVPB 1000 milliGRAM(s) IV Intermittent every 24 hours  diltiazem    milliGRAM(s) Oral daily  enoxaparin Injectable 40 milliGRAM(s) SubCutaneous every 24 hours  lactated ringers. 1000 milliLiter(s) (75 mL/Hr) IV Continuous <Continuous>  lisinopril 20 milliGRAM(s) Oral daily  propranolol 10 milliGRAM(s) Oral every 12 hours  tamsulosin 0.4 milliGRAM(s) Oral at bedtime    MEDICATIONS  (PRN):  QUEtiapine 25 milliGRAM(s) Oral daily PRN agitation      LABOROTORY DATA/MICROBIOLOGY/I & O's:                        13.2   19.60 )-----------( 238      ( 27 Aug 2019 20:12 )             39.3         140  |  101  |  14  ----------------------------<  100<H>  3.9   |  27  |  0.8    Ca    9.2      27 Aug 2019 20:12    TPro  6.4  /  Alb  4.1  /  TBili  0.5  /  DBili  x   /  AST  12  /  ALT  14  /  AlkPhos  98        Urinalysis Basic - ( 27 Aug 2019 22:35 )    Color: Yellow / Appearance: Clear / S.025 / pH: x  Gluc: x / Ketone: 15  / Bili: Negative / Urobili: 0.2 mg/dL   Blood: x / Protein: Negative mg/dL / Nitrite: Positive   Leuk Esterase: Trace / RBC: 1-2 /HPF / WBC 10-25 /HPF   Sq Epi: x / Non Sq Epi: Occasional /HPF / Bacteria: Many      CAPILLARY BLOOD GLUCOSE            Urinalysis Basic - ( 27 Aug 2019 22:35 )    Color: Yellow / Appearance: Clear / S.025 / pH: x  Gluc: x / Ketone: 15  / Bili: Negative / Urobili: 0.2 mg/dL   Blood: x / Protein: Negative mg/dL / Nitrite: Positive   Leuk Esterase: Trace / RBC: 1-2 /HPF / WBC 10-25 /HPF   Sq Epi: x / Non Sq Epi: Occasional /HPF / Bacteria: Many            19 @ 07:01  -  19 @ 07:00  --------------------------------------------------------  IN: 0 mL / OUT: 200 mL / NET: -200 mL        ASSESSMENT:  85 YO M with a PMH of HTN, HLD, and PD? (currently being worked up as out-pt) who presents with c/o generalized weakness for the past x 2-3 days.    1. Generalized weakness 2/2 UTI with prostatitis on CT abdomen/pelvis:  -afebrile ,  wbc improving   -ID consult appreciated - c/w ABX for total 14 days pending UCX sensitivities   - c/w IV rocephin until UCX sensitivities   - UCX growing GNR's.   - off IVFs (LR)  -Physical therapy consult - walks with a walker     2. Leukocytosis 2/2 above:  trending down /trend daily     3. Mild met alkalosis 2/2 dehydration  - off IVFs (LR)  -resolved     4. HX of HTN/HLD  -c/w statin/ ditiazem/ lisinopril     5- BPH:  c/w flomax         PT eval     dvt ppx         Progress note handoff:   pending: clinical improvement/ UCX sensitivities   disposition: home with home care   discussion: with patient - satisfied
Progress Note:  Provider Speciality                            Hospitalist      JOSEPH POPE [id number] 84y Male     CHIEF PRESENTING COMPLAINT:  Patient is a 84y old  Male who presents with a chief complaint of Generalized weakness (28 Aug 2019 01:53)        SUBJECTIVE:  Patient was seen and examined at bedside.   Reports improvement of gen. weakness and mild occasional dysuria   No significant overnight events reported.     HISTORY OF PRESENTING ILLNESS:  HPI:  85 YO M with a PMH of HTN, HLD, and PD? (currently being worked up as out-pt) who presents with c/o generalized weakness for the past x 2-3 days. Associated with burning with urination and increased frequency. Denies any fevers, chills, cough, CP, N/V/D, ABD pain, or back pain. Of note, the pt recently had a [facility] pl;[facility]    In the ED, the pt's UA was positive for UTI. CT-A/P NEG for acute process. Started on IV ABX and IVFs. (28 Aug 2019 01:53)        REVIEW OF SYSTEMS:    At least 10 systems were reviewed in ROS. All systems reviewed  are within normal limits except for the complaints as described in Subjective.    PAST MEDICAL & SURGICAL HISTORY:  PAST MEDICAL & SURGICAL HISTORY:  High cholesterol  Hypertension, unspecified type  H/O hernia repair          VITAL SIGNS:  Vital Signs Last 24 Hrs  T(C): 36.5 (28 Aug 2019 06:05), Max: 38.3 (27 Aug 2019 20:40)  T(F): 97.7 (28 Aug 2019 06:05), Max: 101 (27 Aug 2019 20:40)  HR: 73 (28 Aug 2019 06:05) (73 - 98)  BP: 127/61 (28 Aug 2019 06:05) (127/61 - 154/72)  BP(mean): --  RR: 16 (28 Aug 2019 06:05) (16 - 18)  SpO2: 97% (28 Aug 2019 04:16) (97% - 99%)          PHYSICAL EXAMINATION:    General: Awake, alert , Not in acute distress  HEENT:   EOMI, NO JVD, atraumatic  Heart: S1+S2 audible, no murmur  Lungs: bilateral  fair air entry, no wheezing, no crepitations.  Abdomen: Soft, non-tender, non-distended   CNS: AAO  , no focal deficits.  Extremities:  No edema            CONSULTS:  Consultant(s) Notes Reviewed by me.   Care Discussed with Consultants/Other Providers where required.        MEDICATIONS:  MEDICATIONS  (STANDING):  atorvastatin 80 milliGRAM(s) Oral at bedtime  cefTRIAXone   IVPB 1000 milliGRAM(s) IV Intermittent every 24 hours  diltiazem    milliGRAM(s) Oral daily  enoxaparin Injectable 40 milliGRAM(s) SubCutaneous every 24 hours  lactated ringers. 1000 milliLiter(s) (75 mL/Hr) IV Continuous <Continuous>  lisinopril 20 milliGRAM(s) Oral daily  propranolol 10 milliGRAM(s) Oral every 12 hours  tamsulosin 0.4 milliGRAM(s) Oral at bedtime    MEDICATIONS  (PRN):  QUEtiapine 25 milliGRAM(s) Oral daily PRN agitation      LABOROTORY DATA/MICROBIOLOGY/I & O's:                        13.2   19.60 )-----------( 238      ( 27 Aug 2019 20:12 )             39.3         140  |  101  |  14  ----------------------------<  100<H>  3.9   |  27  |  0.8    Ca    9.2      27 Aug 2019 20:12    TPro  6.4  /  Alb  4.1  /  TBili  0.5  /  DBili  x   /  AST  12  /  ALT  14  /  AlkPhos  98        Urinalysis Basic - ( 27 Aug 2019 22:35 )    Color: Yellow / Appearance: Clear / S.025 / pH: x  Gluc: x / Ketone: 15  / Bili: Negative / Urobili: 0.2 mg/dL   Blood: x / Protein: Negative mg/dL / Nitrite: Positive   Leuk Esterase: Trace / RBC: 1-2 /HPF / WBC 10-25 /HPF   Sq Epi: x / Non Sq Epi: Occasional /HPF / Bacteria: Many      CAPILLARY BLOOD GLUCOSE            Urinalysis Basic - ( 27 Aug 2019 22:35 )    Color: Yellow / Appearance: Clear / S.025 / pH: x  Gluc: x / Ketone: 15  / Bili: Negative / Urobili: 0.2 mg/dL   Blood: x / Protein: Negative mg/dL / Nitrite: Positive   Leuk Esterase: Trace / RBC: 1-2 /HPF / WBC 10-25 /HPF   Sq Epi: x / Non Sq Epi: Occasional /HPF / Bacteria: Many            19 @ 07:01  -  19 @ 07:00  --------------------------------------------------------  IN: 0 mL / OUT: 200 mL / NET: -200 mL              ASSESSMENT:  85 YO M with a PMH of HTN, HLD, and PD? (currently being worked up as out-pt) who presents with c/o generalized weakness for the past x 2-3 days.    1. Generalized weakness 2/2 UTI with prostatitis on CT abdomen/pelvis:  has low grade fever, monitor for further spikes   - c/w IV rocephin   - fu UCX- pending   -ID consult   -IVFs (LR)  -Physical therapy consult     2. Leukocytosis 2/2 above:  trend with today's labs     3. Mild met alkalosis 2/2 dehydration  -IVFs (LR)  -resolved     4. HX of HTN/HLD  -c/w statin/ ditiazem/ lisinopril     5- BPH:  c/w flomax         PT eval     dvt ppx         Progress note handoff:   pending: clinical improvement/ ID consult   disposition: unknown at this time   discussion: with patient - satisfied
TOSHIAJOSEPH  84y, Male  Allergy: No Known Allergies      CHIEF COMPLAINT: Generalized weakness (29 Aug 2019 14:51)      INTERVAL EVENTS/HPI  - No acute events overnight  - T(F): , Max: 99.1 (08-29-19 @ 21:32)  - Denies any worsening symptoms  - Tolerating medication    ROS  10 system review - neg     SOCIAL HISTORY - not relevant     Substance Use (  ) never used  (  ) IVDU (  ) Other:  Tobacco Usage:  (   ) never smoked   (   ) former smoker   (   ) current smoker   Alcohol Usage: (   ) social  (   ) daily use (   ) denies  Sexual History:       FH noncontributory     VITALS:  T(F): 98.9, Max: 99.1 (08-29-19 @ 21:32)  HR: 71  BP: 173/73  RR: 16Vital Signs Last 24 Hrs  T(C): 37.2 (30 Aug 2019 06:39), Max: 37.3 (29 Aug 2019 21:32)  T(F): 98.9 (30 Aug 2019 06:39), Max: 99.1 (29 Aug 2019 21:32)  HR: 71 (30 Aug 2019 06:39) (61 - 71)  BP: 173/73 (30 Aug 2019 06:39) (111/60 - 173/73)  BP(mean): --  RR: 16 (30 Aug 2019 06:39) (16 - 16)  SpO2: --    PHYSICAL EXAM:  Gen: NAD, resting in bed  HEENT: Normocephalic, atraumatic  Neck: supple, no lymphadenopathy  CV: s 1 s 2+   Lungs: clear   Abdomen: Soft, BS present  Ext: Warm, well perfused  Neuro: non focal, awake. not fully oriented to place ( hospital ??)  Skin: no rash, no erythema      TESTS & MEASUREMENTS:                        12.0   16.82 )-----------( 208      ( 29 Aug 2019 06:47 )             35.9     08-29    140  |  102  |  11  ----------------------------<  84  3.3<L>   |  26  |  0.8    Ca    8.6      29 Aug 2019 06:47              Culture - Urine (collected 08-27-19 @ 22:35)  Source: .Urine Clean Catch (Midstream)  Preliminary Report (08-30-19 @ 00:51):    >100,000 CFU/ml Gram Negative Rods    Culture - Blood (collected 08-27-19 @ 20:12)  Source: .Blood Blood  Preliminary Report (08-29-19 @ 05:01):    No growth to date.    Culture - Blood (collected 08-27-19 @ 20:12)  Source: .Blood Blood  Preliminary Report (08-29-19 @ 05:01):    No growth to date.        Lactate, Blood: 1.3 mmol/L (08-27-19 @ 20:12)      INFECTIOUS DISEASES TESTING      RADIOLOGY & ADDITIONAL TESTS:  I have personally reviewed the last Chest xray  CXR      CT  CT Abdomen and Pelvis w/ IV Cont:   EXAM:  CT ABDOMEN AND PELVIS IC            PROCEDURE DATE:  08/28/2019            INTERPRETATION:  CLINICAL HISTORY: Fever, UTI.    TECHNIQUE: Contiguous axial CT images were obtained from the lower chest   to the pubic symphysis following administration of Optiray intravenous   contrast. Oral contrast was not administered. Reformatted images in the   coronal and sagittal planes were acquired.    COMPARISON: CT abdomen and pelvis dated 8/12/2019.      FINDINGS:    LOWER CHEST: Unremarkable.    HEPATOBILIARY: Redemonstrated is a 2.3 cm left hepatic lobe hemangioma.   Additional subcentimeter hypodensity is too small to further characterize.    SPLEEN: Unremarkable.    PANCREAS: Unremarkable.    ADRENAL GLANDS: Unremarkable.    KIDNEYS: No hydronephrosis bilaterally. Renal contrast enhancement is   symmetric. 4 mm nonobstructing left renal calculus versus vascular   calcification.    ABDOMINOPELVIC NODES: No lymphadenopathy.    PELVIC ORGANS: Prostatomegaly.    PERITONEUM/MESENTERY/BOWEL:Sigmoid diverticulosis without evidence of   acute diverticulitis. No pneumoperitoneum. No abdominopelvic ascites. No   evidence of bowel obstruction. The appendix is not definitely seen. There   is no evidence of pericecal inflammation. Bilateral inguinal hernia   repair.    BONES/SOFT TISSUES: No acute osseous abnormality. Stable degenerative   changes of the lumbar spine. Chronic T12 vertebral body compression   deformity.     OTHER: Arteriosclerotic calcifications of the abdominal aorta.      IMPRESSION:    No CT evidence of any acute inflammatory process within the abdomen or   pelvis.    ADDITIONAL FINDINGS AFTER ATTENDING REVIEW:    There is haziness and fat stranding surrounding the seminal vesicles and   prostate gland consistent with prostatitis.    There is nonspecific wall thickening and mucosal hyperenhancement of a   short segment of distal ileum with an adjacent borderline enlarged lymph   node measuring 11 x 9 mm. Findings are similar to prior CT pelvis dated   5/13/2019              HIEN WHITNEY M.D., RESIDENT RADIOLOGIST  This document has been electronically signed.  KEV SHELBY M.D., ATTENDING RADIOLOGIST  This document has been electronically signed. Aug 28 2019  2:37AM             (08-28-19 @ 00:34)      CARDIOLOGY TESTING  12 Lead ECG:   Ventricular Rate 75 BPM    Atrial Rate 75 BPM    P-R Interval 160 ms    QRS Duration 104 ms    Q-T Interval 406 ms    QTC Calculation(Bezet) 453 ms    P Axis 46 degrees    R Axis -78 degrees    T Axis 51 degrees    Diagnosis Line Sinus rhythm with Premature supraventricular complexes  Left axis deviation  Incomplete right bundle branch block  Inferior infarct , age undetermined  Anterolateral infarct , age undetermined  Abnormal ECG    Confirmed by DERICK POSEY, AMERICA ([id number]) on 8/29/2019 1:00:14 PM (08-27-19 @ 21:06)      MEDICATIONS  atorvastatin 80  cefTRIAXone   IVPB 1000  diltiazem     docusate sodium 100  enoxaparin Injectable 40  lisinopril 20  propranolol 10  tamsulosin 0.4      ANTIBIOTICS:  cefTRIAXone   IVPB 1000 milliGRAM(s) IV Intermittent every 24 hours

## 2019-08-31 ENCOUNTER — TRANSCRIPTION ENCOUNTER (OUTPATIENT)
Age: 84
End: 2019-08-31

## 2019-08-31 VITALS
WEIGHT: 143.08 LBS | TEMPERATURE: 97 F | DIASTOLIC BLOOD PRESSURE: 77 MMHG | SYSTOLIC BLOOD PRESSURE: 164 MMHG | HEART RATE: 67 BPM

## 2019-08-31 LAB
ANION GAP SERPL CALC-SCNC: 14 MMOL/L — SIGNIFICANT CHANGE UP (ref 7–14)
BASOPHILS # BLD AUTO: 0.07 K/UL — SIGNIFICANT CHANGE UP (ref 0–0.2)
BASOPHILS NFR BLD AUTO: 0.8 % — SIGNIFICANT CHANGE UP (ref 0–1)
BUN SERPL-MCNC: 10 MG/DL — SIGNIFICANT CHANGE UP (ref 10–20)
CALCIUM SERPL-MCNC: 8.9 MG/DL — SIGNIFICANT CHANGE UP (ref 8.5–10.1)
CHLORIDE SERPL-SCNC: 100 MMOL/L — SIGNIFICANT CHANGE UP (ref 98–110)
CO2 SERPL-SCNC: 26 MMOL/L — SIGNIFICANT CHANGE UP (ref 17–32)
CREAT SERPL-MCNC: 0.8 MG/DL — SIGNIFICANT CHANGE UP (ref 0.7–1.5)
EOSINOPHIL # BLD AUTO: 0.37 K/UL — SIGNIFICANT CHANGE UP (ref 0–0.7)
EOSINOPHIL NFR BLD AUTO: 4.5 % — SIGNIFICANT CHANGE UP (ref 0–8)
GLUCOSE SERPL-MCNC: 93 MG/DL — SIGNIFICANT CHANGE UP (ref 70–99)
HCT VFR BLD CALC: 38.5 % — LOW (ref 42–52)
HGB BLD-MCNC: 13.2 G/DL — LOW (ref 14–18)
IMM GRANULOCYTES NFR BLD AUTO: 0.2 % — SIGNIFICANT CHANGE UP (ref 0.1–0.3)
LYMPHOCYTES # BLD AUTO: 0.98 K/UL — LOW (ref 1.2–3.4)
LYMPHOCYTES # BLD AUTO: 11.9 % — LOW (ref 20.5–51.1)
MCHC RBC-ENTMCNC: 31.1 PG — HIGH (ref 27–31)
MCHC RBC-ENTMCNC: 34.3 G/DL — SIGNIFICANT CHANGE UP (ref 32–37)
MCV RBC AUTO: 90.8 FL — SIGNIFICANT CHANGE UP (ref 80–94)
MONOCYTES # BLD AUTO: 1.37 K/UL — HIGH (ref 0.1–0.6)
MONOCYTES NFR BLD AUTO: 16.6 % — HIGH (ref 1.7–9.3)
NEUTROPHILS # BLD AUTO: 5.46 K/UL — SIGNIFICANT CHANGE UP (ref 1.4–6.5)
NEUTROPHILS NFR BLD AUTO: 66 % — SIGNIFICANT CHANGE UP (ref 42.2–75.2)
NRBC # BLD: 0 /100 WBCS — SIGNIFICANT CHANGE UP (ref 0–0)
PLATELET # BLD AUTO: 250 K/UL — SIGNIFICANT CHANGE UP (ref 130–400)
POTASSIUM SERPL-MCNC: 3.8 MMOL/L — SIGNIFICANT CHANGE UP (ref 3.5–5)
POTASSIUM SERPL-SCNC: 3.8 MMOL/L — SIGNIFICANT CHANGE UP (ref 3.5–5)
RBC # BLD: 4.24 M/UL — LOW (ref 4.7–6.1)
RBC # FLD: 13.3 % — SIGNIFICANT CHANGE UP (ref 11.5–14.5)
SODIUM SERPL-SCNC: 140 MMOL/L — SIGNIFICANT CHANGE UP (ref 135–146)
WBC # BLD: 8.27 K/UL — SIGNIFICANT CHANGE UP (ref 4.8–10.8)
WBC # FLD AUTO: 8.27 K/UL — SIGNIFICANT CHANGE UP (ref 4.8–10.8)

## 2019-08-31 PROCEDURE — 99239 HOSP IP/OBS DSCHRG MGMT >30: CPT

## 2019-08-31 RX ORDER — SENNA PLUS 8.6 MG/1
2 TABLET ORAL
Qty: 60 | Refills: 0
Start: 2019-08-31 | End: 2019-09-29

## 2019-08-31 RX ORDER — LACTOBACILLUS ACIDOPHILUS 100MM CELL
1 CAPSULE ORAL
Qty: 28 | Refills: 0
Start: 2019-08-31 | End: 2019-09-13

## 2019-08-31 RX ORDER — CIPROFLOXACIN LACTATE 400MG/40ML
1 VIAL (ML) INTRAVENOUS
Qty: 18 | Refills: 0
Start: 2019-08-31 | End: 2019-09-08

## 2019-08-31 RX ORDER — POLYETHYLENE GLYCOL 3350 17 G/17G
17 POWDER, FOR SOLUTION ORAL
Qty: 200 | Refills: 0
Start: 2019-08-31

## 2019-08-31 RX ORDER — LACTOBACILLUS ACIDOPHILUS 100MM CELL
1 CAPSULE ORAL
Refills: 0 | Status: DISCONTINUED | OUTPATIENT
Start: 2019-08-31 | End: 2019-08-31

## 2019-08-31 RX ORDER — DOCUSATE SODIUM 100 MG
1 CAPSULE ORAL
Qty: 28 | Refills: 0
Start: 2019-08-31 | End: 2019-09-13

## 2019-08-31 RX ORDER — CIPROFLOXACIN LACTATE 400MG/40ML
500 VIAL (ML) INTRAVENOUS EVERY 12 HOURS
Refills: 0 | Status: DISCONTINUED | OUTPATIENT
Start: 2019-08-31 | End: 2019-08-31

## 2019-08-31 RX ADMIN — LISINOPRIL 20 MILLIGRAM(S): 2.5 TABLET ORAL at 05:19

## 2019-08-31 RX ADMIN — Medication 100 MILLIGRAM(S): at 05:20

## 2019-08-31 RX ADMIN — Medication 500 MILLIGRAM(S): at 10:54

## 2019-08-31 RX ADMIN — ENOXAPARIN SODIUM 40 MILLIGRAM(S): 100 INJECTION SUBCUTANEOUS at 05:19

## 2019-08-31 RX ADMIN — Medication 240 MILLIGRAM(S): at 05:20

## 2019-08-31 NOTE — DISCHARGE NOTE PROVIDER - HOSPITAL COURSE
85 YO M with a PMH of HTN, HLD, and PD? (currently being worked up as out-pt) who presents with c/o generalized weakness for the past x 2-3 days. Associated with burning with urination and increased frequency.        Patient was admitted for UTI and prostatitis on CT abdomen/pelvis and started empirically on IV ABX, UCX growing klebsiella pneumonie sensitive to ciprofloxacin which he will complete at home for 9 more days. He also complained of some abdominal discomfort and enlarged abdominal size, US abdomen is completed awaiting results although recent CT abdomen showed no acute abdominal pathology , has nonobstructing 4mm kidney stone and to follow up with urology as OP>         He is able to ambulate with a walker and wife at home to take care of him.             Vital Signs Last 24 Hrs    T(C): 37.1 (31 Aug 2019 06:00), Max: 37.1 (31 Aug 2019 06:00)    T(F): 98.7 (31 Aug 2019 06:00), Max: 98.7 (31 Aug 2019 06:00)    HR: 77 (31 Aug 2019 06:00) (67 - 77)    BP: 162/67 (31 Aug 2019 06:00) (150/70 - 172/71)    BP(mean): --    RR: 16 (31 Aug 2019 06:00) (16 - 16)    SpO2: --        Physical Examination:    General: AAO x 3   , NAD.    HEENT: PERRLA, EOMI. NO JVD.    CVS:  S1 & S2 appreciated, regular rate and rhythm, no murmurs.     Lungs: clear to auscultation, no wheezing, no rales.     Abdominal Examination: soft, nontender, nondistended(obese)     Neuro: no focal deficits.     Extremity Examination: No edema peripherally.        stable for dc home

## 2019-08-31 NOTE — DISCHARGE NOTE PROVIDER - CARE PROVIDER_API CALL
Sven Conway)  Urology  [address] [place] [address]  Phone: [phone]  Fax: [phone]  Follow Up Time: 1 month

## 2019-08-31 NOTE — DISCHARGE NOTE PROVIDER - NSDCCPCAREPLAN_GEN_ALL_CORE_FT
PRINCIPAL DISCHARGE DIAGNOSIS  Diagnosis: UTI (urinary tract infection)  Assessment and Plan of Treatment: Patient was admitted for UTI and prostatitis on CT abdomen/pelvis and started empirically on IV ABX, UCX growing klebsiella pneumonie sensitive to ciprofloxacin which he will complete at home for 9 more days.      SECONDARY DISCHARGE DIAGNOSES  Diagnosis: Abdominal discomfort  Assessment and Plan of Treatment: He also complained of some abdominal discomfort and enlarged abdominal size, having BM's. US abdomen is completed awaiting results although recent CT abdomen showed no acute abdominal pathology , has nonobstructing 4mm kidney stone and to follow up with urology as OP.    Diagnosis: Weakness  Assessment and Plan of Treatment: due to undelrying infection/resolved. ambulate with walker.

## 2019-09-02 LAB
CULTURE RESULTS: SIGNIFICANT CHANGE UP
CULTURE RESULTS: SIGNIFICANT CHANGE UP
SPECIMEN SOURCE: SIGNIFICANT CHANGE UP
SPECIMEN SOURCE: SIGNIFICANT CHANGE UP

## 2019-09-04 DIAGNOSIS — N41.0 ACUTE PROSTATITIS: ICD-10-CM

## 2019-09-04 DIAGNOSIS — N39.0 URINARY TRACT INFECTION, SITE NOT SPECIFIED: ICD-10-CM

## 2019-09-04 DIAGNOSIS — N40.0 BENIGN PROSTATIC HYPERPLASIA WITHOUT LOWER URINARY TRACT SYMPTOMS: ICD-10-CM

## 2019-09-04 DIAGNOSIS — I10 ESSENTIAL (PRIMARY) HYPERTENSION: ICD-10-CM

## 2019-09-04 DIAGNOSIS — E86.0 DEHYDRATION: ICD-10-CM

## 2019-09-04 DIAGNOSIS — E78.5 HYPERLIPIDEMIA, UNSPECIFIED: ICD-10-CM

## 2019-09-04 DIAGNOSIS — E87.3 ALKALOSIS: ICD-10-CM

## 2019-09-09 NOTE — CDI QUERY NOTE - NSCDIOTHERTXTBX_GEN_ALL_CORE_HH
This patient is an 85 yo male admitted 8/28/19 with a diagnosis of urinary tract infection and prostatitis, per discharge document.     Infectious Disease consult, on 8/29/19, [facility], documents:   Of note, the pt recently had a salgado pl;aced for urinary retention [facility]    In the ED, the pt's UA was positive for UTI. CT-A/P NEG for acute process. Started on IV ABX and IVFs. (28 Aug 2019 01:53)      Patient treated with IV fluids and IV Ceftriaxone and discharged 8/31/19.     Could the diagnosis of urinary tract infection be further specified as likely:     > Related to recent salgado catheter placement  > Unrelated to recent salgado catheter placement  > Other (please specify)_________________________  > Unable to determine

## 2019-09-18 ENCOUNTER — OUTPATIENT (OUTPATIENT)
Dept: OUTPATIENT SERVICES | Facility: HOSPITAL | Age: 84
LOS: 1 days | Discharge: HOME | End: 2019-09-18
Payer: MEDICARE

## 2019-09-18 DIAGNOSIS — Z98.890 OTHER SPECIFIED POSTPROCEDURAL STATES: Chronic | ICD-10-CM

## 2019-09-18 DIAGNOSIS — R25.1 TREMOR, UNSPECIFIED: ICD-10-CM

## 2019-09-18 PROCEDURE — 78607: CPT | Mod: 26

## 2019-09-19 ENCOUNTER — APPOINTMENT (OUTPATIENT)
Dept: UROLOGY | Facility: CLINIC | Age: 84
End: 2019-09-19
Payer: MEDICARE

## 2019-09-19 DIAGNOSIS — R33.9 RETENTION OF URINE, UNSPECIFIED: ICD-10-CM

## 2019-09-19 DIAGNOSIS — N28.1 CYST OF KIDNEY, ACQUIRED: ICD-10-CM

## 2019-09-19 DIAGNOSIS — D18.03 HEMANGIOMA OF INTRA-ABDOMINAL STRUCTURES: ICD-10-CM

## 2019-09-19 PROCEDURE — 99203 OFFICE O/P NEW LOW 30 MIN: CPT

## 2019-09-19 NOTE — ADDENDUM
[FreeTextEntry1] : Documented by Chetna Nolasco NP acting as a scribe for Dr. Viola Dobson \par \par All medical record entries made by the Scribe were at my,  direction and\par personally dictated by me.  I have reviewed the chart and agree that the record\par accurately reflects my personal performance of the history, physical exam, procedure and imaging.  \par \par \par

## 2019-09-19 NOTE — PHYSICAL EXAM
[General Appearance - Well Developed] : well developed [General Appearance - Well Nourished] : well nourished [Normal Appearance] : normal appearance [Well Groomed] : well groomed [General Appearance - In No Acute Distress] : no acute distress [Edema] : no peripheral edema [Respiration, Rhythm And Depth] : normal respiratory rhythm and effort [Abdomen Soft] : soft [Exaggerated Use Of Accessory Muscles For Inspiration] : no accessory muscle use [Abdomen Tenderness] : non-tender [Costovertebral Angle Tenderness] : no ~M costovertebral angle tenderness [Normal Station and Gait] : the gait and station were normal for the patient's age [] : no rash [No Focal Deficits] : no focal deficits [Oriented To Time, Place, And Person] : oriented to person, place, and time [Affect] : the affect was normal [Mood] : the mood was normal [Not Anxious] : not anxious [No Palpable Adenopathy] : no palpable adenopathy

## 2019-09-19 NOTE — ASSESSMENT
[FreeTextEntry1] : 84 year old recently in ER for urinary retention and UTI\par \par -currently asymptomatic \par -Continue Tamsulosin 0.4 mg daily, medication renewed\par -PVR done in office 48 ml.\par -F/U in 6 months to have PVR done\par -Instructed to call if any issues arise before scheduled appointment

## 2019-09-19 NOTE — HISTORY OF PRESENT ILLNESS
[None] : None [Currently Experiencing ___] :  [unfilled] [Urinary Retention] : urinary retention [Nocturia] : nocturia [FreeTextEntry1] : JOSEPH POPE is a 84 year old male with a past medical history of HTN and being worked up for Parkinsons. Presents to the office today after being discharged from the hospital for urinary retention and UTI. Urine culture on 8/27/2019 revealed > 100,00 Klebsiella pneumoniae and treated with Cipro. Currently denies dysuria, hematuria, flank pain, fever, or chills. Patient had Torrez catheter placed in ER for retention and 400 cc of urine drained , catheter was removed 1 week later. Patient on Tamsulosin 0.4 mg daily. In addition, left hepatic hemangioma, left renal cyst and questionable left renal calculus seen on CT done in hospital. No history of kidney stones. Quit smoking 2 years ago, smoked 1 pack a day x 70 years. Denies family history of prostate, renal, and bladder cancer. \par  [Urinary Incontinence] : no urinary incontinence [Urinary Urgency] : no urinary urgency [Straining] : no straining [Urinary Frequency] : no urinary frequency [Weak Stream] : no weak stream [Intermittency] : no intermittency [Dysuria] : no dysuria [Hematuria - Gross] : no gross hematuria

## 2019-09-24 NOTE — CDI QUERY NOTE - NSCDIOTHERTXTBX_GEN_ALL_CORE_HH
Query for Sepsis:  ---------------------------------------------------------------------------------------------  Pt. presenting with c/o burning on urination.  In ED:  T. 101, HR 98, WBC 19.60, Lactate 1.3. UA +, Urine Culture + Klebsiella pneumonae  On ED sheets:  Present on admission:  Sepsis?  YES.  Pt. treated w/ IVF Bolus then infusion and Rocephin 1g IV.    ID Progress note of 8/30 states:  "·  Problem: Sepsis, due to unspecified organism.  Plan: urine cx - GNR   Incr wbc and temp - better on IV Rocephin. "    Please document if Sepsis was POA, Resolved,  developed during admission or was Ruled Out.

## 2019-09-27 ENCOUNTER — OUTPATIENT (OUTPATIENT)
Dept: OUTPATIENT SERVICES | Facility: HOSPITAL | Age: 84
LOS: 1 days | Discharge: HOME | End: 2019-09-27

## 2019-09-27 DIAGNOSIS — Z98.890 OTHER SPECIFIED POSTPROCEDURAL STATES: Chronic | ICD-10-CM

## 2019-09-27 DIAGNOSIS — Z01.21 ENCOUNTER FOR DENTAL EXAMINATION AND CLEANING WITH ABNORMAL FINDINGS: ICD-10-CM

## 2020-07-09 ENCOUNTER — APPOINTMENT (OUTPATIENT)
Dept: UROLOGY | Facility: CLINIC | Age: 85
End: 2020-07-09

## 2020-07-10 ENCOUNTER — APPOINTMENT (OUTPATIENT)
Dept: UROLOGY | Facility: CLINIC | Age: 85
End: 2020-07-10
Payer: MEDICARE

## 2020-07-10 PROCEDURE — 99213 OFFICE O/P EST LOW 20 MIN: CPT | Mod: 95

## 2020-07-27 NOTE — PHYSICAL EXAM
[General Appearance - Well Developed] : well developed [Normal Appearance] : normal appearance [No Focal Deficits] : no focal deficits [Oriented To Time, Place, And Person] : oriented to person, place, and time [] : no respiratory distress

## 2020-08-04 NOTE — REVIEW OF SYSTEMS
[Arthralgias] : arthralgias [Joint Pain] : joint pain [Limb Weakness] : limb weakness [Erectile Dysfunction] : erectile dysfunction [Fever] : no fever [Chills] : no chills [Feeling Poorly] : not feeling poorly [Earache] : no earache [Eye Pain] : no eye pain [Red Eyes] : eyes not red [Loss Of Hearing] : no hearing loss [Heart Rate Is Slow] : the heart rate was not slow [Heart Rate Is Fast] : the heart rate was not fast [Nosebleeds] : no nosebleeds [Constipation] : no constipation [Abdominal Pain] : no abdominal pain [Shortness Of Breath] : no shortness of breath [Vomiting] : no vomiting [Dysuria] : no dysuria [Diarrhea] : no diarrhea [Incontinence] : no incontinence [Skin Lesions] : no skin lesions [Suicidal] : not suicidal [Hesitancy] : no urinary hesitancy [Confused] : no confusion [Sleep Disturbances] : no sleep disturbances [Anxiety] : no anxiety [Depression] : no depression [Proptosis] : no proptosis [Easy Bleeding] : no tendency for easy bleeding [Hot Flashes] : no hot flashes [Muscle Weakness] : no muscle weakness [Easy Bruising] : no tendency for easy bruising

## 2020-08-04 NOTE — ASSESSMENT
[FreeTextEntry1] : 86 yo with bph AND luts\par PATIENT IS EMPTYING PROPERLY\par STILL WITH FREQUENCY\par \par - IS PRESENTLY IN [place]\par WILL OBTAIN us IN [place]\par F/U IN 6 MONTHS
87

## 2020-08-04 NOTE — HISTORY OF PRESENT ILLNESS
[Medical Office: ([facility])___] : at the medical office located in  [Formal Caregiver] : formal caregiver [Verbal consent obtained from patient] : the patient, [unfilled] [Home] : at home, [unfilled] , at the time of the visit. [Urinary Frequency] : urinary frequency [FreeTextEntry1] : 85 yo seen 9/20202 for retention\par the patient has a low PVR\par feels well\par no new complaints\par \par - he will f/u in 6 months time\par  -continue flomax\par  [Urinary Urgency] : no urinary urgency [Urinary Retention] : no urinary retention

## 2020-08-25 ENCOUNTER — OUTPATIENT (OUTPATIENT)
Dept: OUTPATIENT SERVICES | Facility: HOSPITAL | Age: 85
LOS: 1 days | Discharge: HOME | End: 2020-08-25
Payer: MEDICARE

## 2020-08-25 DIAGNOSIS — R33.9 RETENTION OF URINE, UNSPECIFIED: ICD-10-CM

## 2020-08-25 DIAGNOSIS — N40.1 BENIGN PROSTATIC HYPERPLASIA WITH LOWER URINARY TRACT SYMPTOMS: ICD-10-CM

## 2020-08-25 DIAGNOSIS — Z98.890 OTHER SPECIFIED POSTPROCEDURAL STATES: Chronic | ICD-10-CM

## 2020-08-25 PROCEDURE — 76770 US EXAM ABDO BACK WALL COMP: CPT | Mod: 26

## 2020-09-10 ENCOUNTER — OUTPATIENT (OUTPATIENT)
Dept: OUTPATIENT SERVICES | Facility: HOSPITAL | Age: 85
LOS: 1 days | Discharge: HOME | End: 2020-09-10

## 2020-09-10 DIAGNOSIS — Z98.890 OTHER SPECIFIED POSTPROCEDURAL STATES: Chronic | ICD-10-CM

## 2020-09-16 ENCOUNTER — OUTPATIENT (OUTPATIENT)
Dept: OUTPATIENT SERVICES | Facility: HOSPITAL | Age: 85
LOS: 1 days | Discharge: HOME | End: 2020-09-16
Payer: MEDICARE

## 2020-09-16 DIAGNOSIS — G91.2 (IDIOPATHIC) NORMAL PRESSURE HYDROCEPHALUS: ICD-10-CM

## 2020-09-16 DIAGNOSIS — Z98.890 OTHER SPECIFIED POSTPROCEDURAL STATES: Chronic | ICD-10-CM

## 2020-09-16 PROCEDURE — 70551 MRI BRAIN STEM W/O DYE: CPT | Mod: 26

## 2020-10-01 ENCOUNTER — APPOINTMENT (OUTPATIENT)
Dept: UROLOGY | Facility: CLINIC | Age: 85
End: 2020-10-01
Payer: MEDICARE

## 2020-10-01 VITALS — WEIGHT: 142 LBS | TEMPERATURE: 97.6 F | HEIGHT: 66 IN | BODY MASS INDEX: 22.82 KG/M2

## 2020-10-01 PROCEDURE — 99213 OFFICE O/P EST LOW 20 MIN: CPT

## 2020-10-02 ENCOUNTER — OUTPATIENT (OUTPATIENT)
Dept: OUTPATIENT SERVICES | Facility: HOSPITAL | Age: 85
LOS: 1 days | Discharge: HOME | End: 2020-10-02

## 2020-10-02 DIAGNOSIS — Z98.890 OTHER SPECIFIED POSTPROCEDURAL STATES: Chronic | ICD-10-CM

## 2020-10-04 NOTE — ASSESSMENT
[FreeTextEntry1] : 84 yo with groin pain\par no evidence of infection\par suspect hip related problems\par \par - f/u as scheduled\par - continue flomax

## 2020-10-04 NOTE — HISTORY OF PRESENT ILLNESS
[FreeTextEntry1] : 86 yo seen 9/20202 for retention\par complains of lower abdominal pain and groin\par \par the patient has a low PVR today (0 cc)\par \par renal and bladder US 8/25/2020\par prostate 74 cc\par post void 27 cc\par \par UA and Cx 10.1\par <10,000\par no RBCs\par  [None] : no symptoms

## 2020-10-04 NOTE — PHYSICAL EXAM
[General Appearance - Well Developed] : well developed [Normal Appearance] : normal appearance [Urinary Bladder Findings] : the bladder was normal on palpation [Scrotum] : the scrotum was normal [No Prostate Nodules] : no prostate nodules [] : no respiratory distress [Oriented To Time, Place, And Person] : oriented to person, place, and time [No Focal Deficits] : no focal deficits

## 2020-10-04 NOTE — REVIEW OF SYSTEMS
[Fever] : no fever [Chills] : no chills [Feeling Poorly] : not feeling poorly [Eye Pain] : no eye pain [Red Eyes] : eyes not red [Earache] : no earache [Loss Of Hearing] : no hearing loss [Nosebleeds] : no nosebleeds [Heart Rate Is Slow] : the heart rate was not slow [Heart Rate Is Fast] : the heart rate was not fast [Shortness Of Breath] : no shortness of breath [Abdominal Pain] : no abdominal pain [Vomiting] : no vomiting [Constipation] : no constipation [Diarrhea] : no diarrhea [Dysuria] : no dysuria [Incontinence] : no incontinence [Hesitancy] : no urinary hesitancy [Arthralgias] : arthralgias [Joint Pain] : joint pain [Skin Lesions] : no skin lesions [Confused] : no confusion [Limb Weakness] : limb weakness [Suicidal] : not suicidal [Sleep Disturbances] : no sleep disturbances [Anxiety] : no anxiety [Depression] : no depression [Proptosis] : no proptosis [Hot Flashes] : no hot flashes [Muscle Weakness] : no muscle weakness [Erectile Dysfunction] : erectile dysfunction [Easy Bleeding] : no tendency for easy bleeding [Easy Bruising] : no tendency for easy bruising

## 2020-10-04 NOTE — LETTER BODY
[Dear  ___] : Dear  [unfilled], [Consult Letter:] : I had the pleasure of evaluating your patient, [unfilled]. [Please see my note below.] : Please see my note below. [Sincerely,] : Sincerely, [FreeTextEntry3] : Viola Dobson MD, FACS\par

## 2020-10-05 LAB
APPEARANCE: CLEAR
BACTERIA UR CULT: NORMAL
BILIRUBIN URINE: NEGATIVE
BLOOD URINE: NEGATIVE
COLOR: NORMAL
GLUCOSE QUALITATIVE U: NEGATIVE
KETONES URINE: NEGATIVE
LEUKOCYTE ESTERASE URINE: NEGATIVE
NITRITE URINE: NEGATIVE
PH URINE: 6
PROTEIN URINE: NEGATIVE
SPECIFIC GRAVITY URINE: 1.02
UROBILINOGEN URINE: NORMAL

## 2020-10-11 ENCOUNTER — INPATIENT (INPATIENT)
Facility: HOSPITAL | Age: 85
LOS: 1 days | Discharge: HOME HEALTH | End: 2020-10-13
Attending: STUDENT IN AN ORGANIZED HEALTH CARE EDUCATION/TRAINING PROGRAM | Admitting: INTERNAL MEDICINE
Payer: MEDICARE

## 2020-10-11 VITALS
TEMPERATURE: 98 F | HEART RATE: 61 BPM | WEIGHT: 139.99 LBS | RESPIRATION RATE: 20 BRPM | HEIGHT: 64 IN | DIASTOLIC BLOOD PRESSURE: 65 MMHG | SYSTOLIC BLOOD PRESSURE: 143 MMHG

## 2020-10-11 DIAGNOSIS — S72.001A FRACTURE OF UNSPECIFIED PART OF NECK OF RIGHT FEMUR, INITIAL ENCOUNTER FOR CLOSED FRACTURE: ICD-10-CM

## 2020-10-11 DIAGNOSIS — I10 ESSENTIAL (PRIMARY) HYPERTENSION: ICD-10-CM

## 2020-10-11 DIAGNOSIS — Z98.890 OTHER SPECIFIED POSTPROCEDURAL STATES: Chronic | ICD-10-CM

## 2020-10-11 LAB
ALBUMIN SERPL ELPH-MCNC: 4 G/DL — SIGNIFICANT CHANGE UP (ref 3.5–5.2)
ALP SERPL-CCNC: 88 U/L — SIGNIFICANT CHANGE UP (ref 30–115)
ALT FLD-CCNC: 18 U/L — SIGNIFICANT CHANGE UP (ref 0–41)
ANION GAP SERPL CALC-SCNC: 11 MMOL/L — SIGNIFICANT CHANGE UP (ref 7–14)
APTT BLD: 29.3 SEC — SIGNIFICANT CHANGE UP (ref 27–39.2)
AST SERPL-CCNC: 19 U/L — SIGNIFICANT CHANGE UP (ref 0–41)
BASOPHILS # BLD AUTO: 0.08 K/UL — SIGNIFICANT CHANGE UP (ref 0–0.2)
BASOPHILS NFR BLD AUTO: 1 % — SIGNIFICANT CHANGE UP (ref 0–1)
BILIRUB SERPL-MCNC: 0.3 MG/DL — SIGNIFICANT CHANGE UP (ref 0.2–1.2)
BLD GP AB SCN SERPL QL: SIGNIFICANT CHANGE UP
BUN SERPL-MCNC: 11 MG/DL — SIGNIFICANT CHANGE UP (ref 10–20)
CALCIUM SERPL-MCNC: 9.2 MG/DL — SIGNIFICANT CHANGE UP (ref 8.5–10.1)
CHLORIDE SERPL-SCNC: 104 MMOL/L — SIGNIFICANT CHANGE UP (ref 98–110)
CO2 SERPL-SCNC: 25 MMOL/L — SIGNIFICANT CHANGE UP (ref 17–32)
CREAT SERPL-MCNC: 0.9 MG/DL — SIGNIFICANT CHANGE UP (ref 0.7–1.5)
EOSINOPHIL # BLD AUTO: 0.4 K/UL — SIGNIFICANT CHANGE UP (ref 0–0.7)
EOSINOPHIL NFR BLD AUTO: 4.9 % — SIGNIFICANT CHANGE UP (ref 0–8)
GLUCOSE SERPL-MCNC: 98 MG/DL — SIGNIFICANT CHANGE UP (ref 70–99)
HCT VFR BLD CALC: 42.3 % — SIGNIFICANT CHANGE UP (ref 42–52)
HGB BLD-MCNC: 14.1 G/DL — SIGNIFICANT CHANGE UP (ref 14–18)
IMM GRANULOCYTES NFR BLD AUTO: 0.2 % — SIGNIFICANT CHANGE UP (ref 0.1–0.3)
INR BLD: 1.14 RATIO — SIGNIFICANT CHANGE UP (ref 0.65–1.3)
LYMPHOCYTES # BLD AUTO: 1.39 K/UL — SIGNIFICANT CHANGE UP (ref 1.2–3.4)
LYMPHOCYTES # BLD AUTO: 17.1 % — LOW (ref 20.5–51.1)
MCHC RBC-ENTMCNC: 31.1 PG — HIGH (ref 27–31)
MCHC RBC-ENTMCNC: 33.3 G/DL — SIGNIFICANT CHANGE UP (ref 32–37)
MCV RBC AUTO: 93.2 FL — SIGNIFICANT CHANGE UP (ref 80–94)
MONOCYTES # BLD AUTO: 0.87 K/UL — HIGH (ref 0.1–0.6)
MONOCYTES NFR BLD AUTO: 10.7 % — HIGH (ref 1.7–9.3)
NEUTROPHILS # BLD AUTO: 5.38 K/UL — SIGNIFICANT CHANGE UP (ref 1.4–6.5)
NEUTROPHILS NFR BLD AUTO: 66.1 % — SIGNIFICANT CHANGE UP (ref 42.2–75.2)
NRBC # BLD: 0 /100 WBCS — SIGNIFICANT CHANGE UP (ref 0–0)
PLATELET # BLD AUTO: 198 K/UL — SIGNIFICANT CHANGE UP (ref 130–400)
POTASSIUM SERPL-MCNC: 4 MMOL/L — SIGNIFICANT CHANGE UP (ref 3.5–5)
POTASSIUM SERPL-SCNC: 4 MMOL/L — SIGNIFICANT CHANGE UP (ref 3.5–5)
PROT SERPL-MCNC: 6.2 G/DL — SIGNIFICANT CHANGE UP (ref 6–8)
PROTHROM AB SERPL-ACNC: 13.1 SEC — HIGH (ref 9.95–12.87)
RAPID RVP RESULT: SIGNIFICANT CHANGE UP
RBC # BLD: 4.54 M/UL — LOW (ref 4.7–6.1)
RBC # FLD: 13.3 % — SIGNIFICANT CHANGE UP (ref 11.5–14.5)
SARS-COV-2 RNA SPEC QL NAA+PROBE: SIGNIFICANT CHANGE UP
SODIUM SERPL-SCNC: 140 MMOL/L — SIGNIFICANT CHANGE UP (ref 135–146)
WBC # BLD: 8.14 K/UL — SIGNIFICANT CHANGE UP (ref 4.8–10.8)
WBC # FLD AUTO: 8.14 K/UL — SIGNIFICANT CHANGE UP (ref 4.8–10.8)

## 2020-10-11 PROCEDURE — 73552 X-RAY EXAM OF FEMUR 2/>: CPT | Mod: 26,RT

## 2020-10-11 PROCEDURE — 99233 SBSQ HOSP IP/OBS HIGH 50: CPT

## 2020-10-11 PROCEDURE — 73501 X-RAY EXAM HIP UNI 1 VIEW: CPT | Mod: 26,RT

## 2020-10-11 PROCEDURE — 73502 X-RAY EXAM HIP UNI 2-3 VIEWS: CPT | Mod: 26,RT

## 2020-10-11 PROCEDURE — 72170 X-RAY EXAM OF PELVIS: CPT | Mod: 26,59

## 2020-10-11 PROCEDURE — 73501 X-RAY EXAM HIP UNI 1 VIEW: CPT | Mod: 26,RT,77

## 2020-10-11 PROCEDURE — 71045 X-RAY EXAM CHEST 1 VIEW: CPT | Mod: 26

## 2020-10-11 PROCEDURE — 99285 EMERGENCY DEPT VISIT HI MDM: CPT | Mod: CS

## 2020-10-11 RX ORDER — SODIUM CHLORIDE 9 MG/ML
1000 INJECTION, SOLUTION INTRAVENOUS
Refills: 0 | Status: DISCONTINUED | OUTPATIENT
Start: 2020-10-12 | End: 2020-10-12

## 2020-10-11 RX ORDER — DILTIAZEM HCL 120 MG
240 CAPSULE, EXT RELEASE 24 HR ORAL DAILY
Refills: 0 | Status: DISCONTINUED | OUTPATIENT
Start: 2020-10-11 | End: 2020-10-12

## 2020-10-11 RX ORDER — CALCIUM CARBONATE 500(1250)
1 TABLET ORAL DAILY
Refills: 0 | Status: DISCONTINUED | OUTPATIENT
Start: 2020-10-11 | End: 2020-10-12

## 2020-10-11 RX ORDER — KETOROLAC TROMETHAMINE 30 MG/ML
15 SYRINGE (ML) INJECTION ONCE
Refills: 0 | Status: DISCONTINUED | OUTPATIENT
Start: 2020-10-11 | End: 2020-10-11

## 2020-10-11 RX ORDER — MORPHINE SULFATE 50 MG/1
2 CAPSULE, EXTENDED RELEASE ORAL EVERY 4 HOURS
Refills: 0 | Status: DISCONTINUED | OUTPATIENT
Start: 2020-10-11 | End: 2020-10-12

## 2020-10-11 RX ORDER — ATORVASTATIN CALCIUM 80 MG/1
80 TABLET, FILM COATED ORAL AT BEDTIME
Refills: 0 | Status: DISCONTINUED | OUTPATIENT
Start: 2020-10-11 | End: 2020-10-12

## 2020-10-11 RX ORDER — LISINOPRIL 2.5 MG/1
20 TABLET ORAL DAILY
Refills: 0 | Status: DISCONTINUED | OUTPATIENT
Start: 2020-10-11 | End: 2020-10-12

## 2020-10-11 RX ORDER — INFLUENZA VIRUS VACCINE 15; 15; 15; 15 UG/.5ML; UG/.5ML; UG/.5ML; UG/.5ML
0.5 SUSPENSION INTRAMUSCULAR ONCE
Refills: 0 | Status: COMPLETED | OUTPATIENT
Start: 2020-10-11 | End: 2020-10-11

## 2020-10-11 RX ORDER — BUPROPION HYDROCHLORIDE 150 MG/1
75 TABLET, EXTENDED RELEASE ORAL DAILY
Refills: 0 | Status: DISCONTINUED | OUTPATIENT
Start: 2020-10-11 | End: 2020-10-11

## 2020-10-11 RX ORDER — ASPIRIN/CALCIUM CARB/MAGNESIUM 324 MG
81 TABLET ORAL DAILY
Refills: 0 | Status: DISCONTINUED | OUTPATIENT
Start: 2020-10-11 | End: 2020-10-12

## 2020-10-11 RX ORDER — MORPHINE SULFATE 50 MG/1
4 CAPSULE, EXTENDED RELEASE ORAL ONCE
Refills: 0 | Status: DISCONTINUED | OUTPATIENT
Start: 2020-10-11 | End: 2020-10-11

## 2020-10-11 RX ORDER — HEPARIN SODIUM 5000 [USP'U]/ML
5000 INJECTION INTRAVENOUS; SUBCUTANEOUS EVERY 12 HOURS
Refills: 0 | Status: DISCONTINUED | OUTPATIENT
Start: 2020-10-11 | End: 2020-10-12

## 2020-10-11 RX ORDER — CHOLECALCIFEROL (VITAMIN D3) 125 MCG
800 CAPSULE ORAL DAILY
Refills: 0 | Status: DISCONTINUED | OUTPATIENT
Start: 2020-10-11 | End: 2020-10-12

## 2020-10-11 RX ORDER — TAMSULOSIN HYDROCHLORIDE 0.4 MG/1
0.4 CAPSULE ORAL AT BEDTIME
Refills: 0 | Status: DISCONTINUED | OUTPATIENT
Start: 2020-10-11 | End: 2020-10-12

## 2020-10-11 RX ORDER — BUPROPION HYDROCHLORIDE 150 MG/1
150 TABLET, EXTENDED RELEASE ORAL DAILY
Refills: 0 | Status: DISCONTINUED | OUTPATIENT
Start: 2020-10-11 | End: 2020-10-12

## 2020-10-11 RX ORDER — ISOSORBIDE MONONITRATE 60 MG/1
30 TABLET, EXTENDED RELEASE ORAL DAILY
Refills: 0 | Status: DISCONTINUED | OUTPATIENT
Start: 2020-10-11 | End: 2020-10-12

## 2020-10-11 RX ORDER — BUPROPION HYDROCHLORIDE 150 MG/1
150 TABLET, EXTENDED RELEASE ORAL DAILY
Refills: 0 | Status: DISCONTINUED | OUTPATIENT
Start: 2020-10-11 | End: 2020-10-11

## 2020-10-11 RX ORDER — FAMOTIDINE 10 MG/ML
20 INJECTION INTRAVENOUS
Refills: 0 | Status: DISCONTINUED | OUTPATIENT
Start: 2020-10-11 | End: 2020-10-12

## 2020-10-11 RX ADMIN — MORPHINE SULFATE 4 MILLIGRAM(S): 50 CAPSULE, EXTENDED RELEASE ORAL at 16:51

## 2020-10-11 RX ADMIN — MORPHINE SULFATE 2 MILLIGRAM(S): 50 CAPSULE, EXTENDED RELEASE ORAL at 21:40

## 2020-10-11 RX ADMIN — ATORVASTATIN CALCIUM 80 MILLIGRAM(S): 80 TABLET, FILM COATED ORAL at 22:52

## 2020-10-11 RX ADMIN — MORPHINE SULFATE 4 MILLIGRAM(S): 50 CAPSULE, EXTENDED RELEASE ORAL at 15:55

## 2020-10-11 RX ADMIN — Medication 0.1 MILLIGRAM(S): at 21:40

## 2020-10-11 RX ADMIN — TAMSULOSIN HYDROCHLORIDE 0.4 MILLIGRAM(S): 0.4 CAPSULE ORAL at 22:52

## 2020-10-11 RX ADMIN — MORPHINE SULFATE 2 MILLIGRAM(S): 50 CAPSULE, EXTENDED RELEASE ORAL at 22:39

## 2020-10-11 RX ADMIN — MORPHINE SULFATE 4 MILLIGRAM(S): 50 CAPSULE, EXTENDED RELEASE ORAL at 17:11

## 2020-10-11 RX ADMIN — Medication 15 MILLIGRAM(S): at 17:10

## 2020-10-11 NOTE — H&P ADULT - NSHPPHYSICALEXAM_GEN_ALL_CORE
GENERAL:  84y/o Male NAD, resting comfortably.  HEAD:  Atraumatic, Normocephalic  EYES: EOMI, PERRLA, conjunctiva and sclera clear  NECK: Supple, No JVD, no cervical lymphadenopathy, non-tender  CHEST/LUNG: Clear to auscultation bilaterally; No wheeze, rhonchi, or rales  HEART: Regular rate and rhythm; S1&S2  ABDOMEN: Soft, Nontender, Nondistended x 4 quadrants; Bowel sounds present  EXTREMITIES:  +tenderness along right lateral hip with palpable deformity.   RLE is shortened and externally rotated. decreased ROM at the hip 2/2 pain.  Pelvis stable.  Peripheral Pulses Present, No clubbing, no cyanosis, or no edema, no calf tenderness  PSYCH: AAOx3, cooperative, appropriate  NEUROLOGY: WNL  SKIN: WNL GENERAL:  84y/o Male NAD, resting comfortably.  HEAD:  Atraumatic, Normocephalic  EYES: EOMI, PERRLA, conjunctiva and sclera clear  NECK: Supple, No JVD, no cervical lymphadenopathy, non-tender  CHEST/LUNG: Clear to auscultation bilaterally; No wheeze, rhonchi, or rales  HEART: Regular rate and rhythm; S1&S2  ABDOMEN: Soft, Nontender, Nondistended x 4 quadrants; Bowel sounds present  EXTREMITIES:  +tenderness along right lateral hip, RLE shortened and externally rotated    decreased ROM at the hip 2/2 pain.  Pelvis stable.  Peripheral Pulses Present, No clubbing, no cyanosis, or no edema, no calf tenderness  PSYCH: AAOx3, cooperative, appropriate  NEUROLOGY: WNL  SKIN: WNL

## 2020-10-11 NOTE — ED PROVIDER NOTE - PROGRESS NOTE DETAILS
ARLEN: patient with femoral neck fx, ortho resident aware, states Dr. Xochitl Duenas will take patient to the OR at [facility] tomorrow morning. patient and family aware. Hospitalist aware of admission.

## 2020-10-11 NOTE — ED PROVIDER NOTE - PHYSICAL EXAMINATION
VITALS:  I have reviewed the initial vital signs.  GENERAL: Well-developed, well-nourished, in no acute distress.  HEENT: NC/AT. Sclera clear. No hyphema. EOMI, PERRLA. No facial bony ttp.   NECK: supple w FROM. No paraspinal muscle ttp. No midline cervical spinous tenderness, step offs, or deformity.  CARDIO: RRR, nl S1 and S2. No murmurs, rubs, or gallops. 2+ radial and pedal pulses bilaterally. No chest wall tenderness.  PULM: Normal effort. No tachypnea or retractions. No flail chest. CTA b/l without wheezes, rales, or rhonchi. Good air entry b/l.  MSK: +ttp along right lateral hip with palpable deformity. RLE is shortened and externally rotated. decreased ROM at the hip 2/2 pain. otherwise FROM to extremities x4 w/o swelling/erythema/ecchymosis/deformity/ttp. No midline thoracic or lumbar spinous tenderness, step offs, or deformity. Pelvis stable.  GI: Abdomen soft and non-distended. Nontender throughout.  SKIN: Warm, dry. No ecchymosis, erythema, or wounds. Capillary refill <2 seconds.  NEURO: A&Ox3. Speech clear. CN II-XII intact. 5/5 strength to upper and lower extremities b/l. Sensation intact and equal throughout.

## 2020-10-11 NOTE — H&P ADULT - NSHPLABSRESULTS_GEN_ALL_CORE
14.1   8.14  )-----------( 198      ( 11 Oct 2020 16:00 )             42.3       10-11    140  |  104  |  11  ----------------------------<  98  4.0   |  25  |  0.9    Ca    9.2      11 Oct 2020 16:00    TPro  6.2  /  Alb  4.0  /  TBili  0.3  /  DBili  x   /  AST  19  /  ALT  18  /  AlkPhos  88  10-11            PT/INR - ( 11 Oct 2020 16:00 )   PT: 13.10 sec;   INR: 1.14 ratio         PTT - ( 11 Oct 2020 16:00 )  PTT:29.3 sec    Lactate Trend    < from: Xray Pelvis AP only (10.11.20 @ 16:29) >    Impression:    Acute displaced right femoral neck fracture with varus angulation.            HOWARD TINOCO M.D., RESIDENT RADIOLOGIST  This document has been electronically signed.  MAICO ASENCIO M.D., ATTENDING RADIOLOGIST  This document has been electronically signed. Oct 11 2020  6:01PM    < end of copied text >              CAPILLARY BLOOD GLUCOSE

## 2020-10-11 NOTE — H&P ADULT - HISTORY OF PRESENT ILLNESS
85 year old male hx of left acetabular fx NO SX REPAIR, HTN, HLD, on 81 mg ASA daily presents to the ED for evaluation right lateral hip pain s/p ground level fall just prior to arrival.   Pt was walking out to his backyard to smoke wearing socks only when his sock caught on the metal door frame, causing him to fall onto his right side. Denies head injury, denies neck or back pain at present time and denies LOC.    Has been unable to bear weight on this extremity since.   Denies headaches, vision changes, neck pain/stiffness, chest pain, shortness of breath, back pain, abd pain, n/v, dizziness, lightheadedness, behavioral/mental status change, paresthesias, numbness, weakness.  Patient states at present time pain is 5/10 when he moves.

## 2020-10-11 NOTE — ED ADULT TRIAGE NOTE - CHIEF COMPLAINT QUOTE
BIBA from home as per pt "My sock got stuck and I went flying." c/o right hip pain. Pt denies LOC and on ASA

## 2020-10-11 NOTE — ED PROVIDER NOTE - OBJECTIVE STATEMENT
85 year old male hx of left acetabular fx, HTN, HLD, on 81 mg ASA daily presents to the ED for evaluation of constant, moderate right lateral hip pain s/p ground level fall just prior to arrival. Pt was walking out to his backyard to smoke wearing socks only when his sock caught on the metal door frame, causing him to fall onto his right side. Denies head injury/LOC. Remembers everything before/during/after. Has been unable to bear weight on this extremity since. Denies headaches, vision changes, neck pain/stiffness, chest pain, shortness of breath, back pain, abd pain, n/v, dizziness, lightheadedness, behavioral/mental status change, paresthesias, numbness, weakness.

## 2020-10-11 NOTE — H&P ADULT - ATTENDING COMMENTS
85 year old male hx of left acetabular fx ( nonsurgical ) , HTN, HLD, on 81 mg ASA daily presents to the ED for evaluation right lateral hip pain s/p ground level fall just prior to arrival. In ED found to have R Femoral Neck Fracture. Orthopedics consulted, plan for OR tomorrow morning. Will make patient NPO at midnight, and monitor labs/ vitals. This is likely patients second fragility fracture, currently not on any therapy for osteoporosis. Will start calcium/vitamin D, and patient will need to follow up with PCP to start Bisphosphonate therapy if he is able to tolerate. Will continue patients home medications for HTN and HLD, confirmed with patient's wife.     Gabriela Abebe, DO

## 2020-10-12 ENCOUNTER — RESULT REVIEW (OUTPATIENT)
Age: 85
End: 2020-10-12

## 2020-10-12 LAB
ANION GAP SERPL CALC-SCNC: 8 MMOL/L — SIGNIFICANT CHANGE UP (ref 7–14)
BASOPHILS # BLD AUTO: 0.05 K/UL — SIGNIFICANT CHANGE UP (ref 0–0.2)
BASOPHILS NFR BLD AUTO: 0.5 % — SIGNIFICANT CHANGE UP (ref 0–1)
BLD GP AB SCN SERPL QL: SIGNIFICANT CHANGE UP
BUN SERPL-MCNC: 12 MG/DL — SIGNIFICANT CHANGE UP (ref 10–20)
CALCIUM SERPL-MCNC: 8.7 MG/DL — SIGNIFICANT CHANGE UP (ref 8.5–10.1)
CHLORIDE SERPL-SCNC: 102 MMOL/L — SIGNIFICANT CHANGE UP (ref 98–110)
CO2 SERPL-SCNC: 26 MMOL/L — SIGNIFICANT CHANGE UP (ref 17–32)
CREAT SERPL-MCNC: 0.8 MG/DL — SIGNIFICANT CHANGE UP (ref 0.7–1.5)
EOSINOPHIL # BLD AUTO: 0.5 K/UL — SIGNIFICANT CHANGE UP (ref 0–0.7)
EOSINOPHIL NFR BLD AUTO: 5.2 % — SIGNIFICANT CHANGE UP (ref 0–8)
GLUCOSE BLDC GLUCOMTR-MCNC: 119 MG/DL — HIGH (ref 70–99)
GLUCOSE BLDC GLUCOMTR-MCNC: 91 MG/DL — SIGNIFICANT CHANGE UP (ref 70–99)
GLUCOSE SERPL-MCNC: 102 MG/DL — HIGH (ref 70–99)
HCT VFR BLD CALC: 41.5 % — LOW (ref 42–52)
HGB BLD-MCNC: 14.2 G/DL — SIGNIFICANT CHANGE UP (ref 14–18)
IMM GRANULOCYTES NFR BLD AUTO: 0.4 % — HIGH (ref 0.1–0.3)
LYMPHOCYTES # BLD AUTO: 0.9 K/UL — LOW (ref 1.2–3.4)
LYMPHOCYTES # BLD AUTO: 9.4 % — LOW (ref 20.5–51.1)
MCHC RBC-ENTMCNC: 31.6 PG — HIGH (ref 27–31)
MCHC RBC-ENTMCNC: 34.2 G/DL — SIGNIFICANT CHANGE UP (ref 32–37)
MCV RBC AUTO: 92.2 FL — SIGNIFICANT CHANGE UP (ref 80–94)
MONOCYTES # BLD AUTO: 1.04 K/UL — HIGH (ref 0.1–0.6)
MONOCYTES NFR BLD AUTO: 10.9 % — HIGH (ref 1.7–9.3)
NEUTROPHILS # BLD AUTO: 7 K/UL — HIGH (ref 1.4–6.5)
NEUTROPHILS NFR BLD AUTO: 73.6 % — SIGNIFICANT CHANGE UP (ref 42.2–75.2)
NRBC # BLD: 0 /100 WBCS — SIGNIFICANT CHANGE UP (ref 0–0)
PLATELET # BLD AUTO: 173 K/UL — SIGNIFICANT CHANGE UP (ref 130–400)
POTASSIUM SERPL-MCNC: 3.6 MMOL/L — SIGNIFICANT CHANGE UP (ref 3.5–5)
POTASSIUM SERPL-SCNC: 3.6 MMOL/L — SIGNIFICANT CHANGE UP (ref 3.5–5)
RBC # BLD: 4.5 M/UL — LOW (ref 4.7–6.1)
RBC # FLD: 13 % — SIGNIFICANT CHANGE UP (ref 11.5–14.5)
SARS-COV-2 IGG SERPL QL IA: NEGATIVE — SIGNIFICANT CHANGE UP
SARS-COV-2 IGM SERPL IA-ACNC: <3.8 AU/ML — SIGNIFICANT CHANGE UP
SODIUM SERPL-SCNC: 136 MMOL/L — SIGNIFICANT CHANGE UP (ref 135–146)
WBC # BLD: 9.53 K/UL — SIGNIFICANT CHANGE UP (ref 4.8–10.8)
WBC # FLD AUTO: 9.53 K/UL — SIGNIFICANT CHANGE UP (ref 4.8–10.8)

## 2020-10-12 PROCEDURE — 99233 SBSQ HOSP IP/OBS HIGH 50: CPT

## 2020-10-12 PROCEDURE — 73501 X-RAY EXAM HIP UNI 1 VIEW: CPT | Mod: 26,RT

## 2020-10-12 PROCEDURE — 88311 DECALCIFY TISSUE: CPT | Mod: 26

## 2020-10-12 PROCEDURE — 88305 TISSUE EXAM BY PATHOLOGIST: CPT | Mod: 26

## 2020-10-12 RX ORDER — DILTIAZEM HCL 120 MG
240 CAPSULE, EXT RELEASE 24 HR ORAL DAILY
Refills: 0 | Status: DISCONTINUED | OUTPATIENT
Start: 2020-10-12 | End: 2020-10-13

## 2020-10-12 RX ORDER — ATORVASTATIN CALCIUM 80 MG/1
80 TABLET, FILM COATED ORAL AT BEDTIME
Refills: 0 | Status: DISCONTINUED | OUTPATIENT
Start: 2020-10-12 | End: 2020-10-13

## 2020-10-12 RX ORDER — ISOSORBIDE MONONITRATE 60 MG/1
30 TABLET, EXTENDED RELEASE ORAL DAILY
Refills: 0 | Status: DISCONTINUED | OUTPATIENT
Start: 2020-10-12 | End: 2020-10-13

## 2020-10-12 RX ORDER — MORPHINE SULFATE 50 MG/1
1 CAPSULE, EXTENDED RELEASE ORAL
Refills: 0 | Status: DISCONTINUED | OUTPATIENT
Start: 2020-10-12 | End: 2020-10-12

## 2020-10-12 RX ORDER — MORPHINE SULFATE 50 MG/1
2 CAPSULE, EXTENDED RELEASE ORAL EVERY 4 HOURS
Refills: 0 | Status: DISCONTINUED | OUTPATIENT
Start: 2020-10-12 | End: 2020-10-13

## 2020-10-12 RX ORDER — SODIUM CHLORIDE 9 MG/ML
1000 INJECTION INTRAMUSCULAR; INTRAVENOUS; SUBCUTANEOUS
Refills: 0 | Status: DISCONTINUED | OUTPATIENT
Start: 2020-10-12 | End: 2020-10-13

## 2020-10-12 RX ORDER — MORPHINE SULFATE 50 MG/1
2 CAPSULE, EXTENDED RELEASE ORAL
Refills: 0 | Status: DISCONTINUED | OUTPATIENT
Start: 2020-10-12 | End: 2020-10-12

## 2020-10-12 RX ORDER — ACETAMINOPHEN 500 MG
650 TABLET ORAL EVERY 6 HOURS
Refills: 0 | Status: DISCONTINUED | OUTPATIENT
Start: 2020-10-12 | End: 2020-10-13

## 2020-10-12 RX ORDER — LISINOPRIL 2.5 MG/1
20 TABLET ORAL DAILY
Refills: 0 | Status: DISCONTINUED | OUTPATIENT
Start: 2020-10-12 | End: 2020-10-13

## 2020-10-12 RX ORDER — HYDRALAZINE HCL 50 MG
25 TABLET ORAL THREE TIMES A DAY
Refills: 0 | Status: DISCONTINUED | OUTPATIENT
Start: 2020-10-12 | End: 2020-10-13

## 2020-10-12 RX ORDER — TRAMADOL HYDROCHLORIDE 50 MG/1
50 TABLET ORAL EVERY 6 HOURS
Refills: 0 | Status: DISCONTINUED | OUTPATIENT
Start: 2020-10-12 | End: 2020-10-13

## 2020-10-12 RX ORDER — MORPHINE SULFATE 50 MG/1
2 CAPSULE, EXTENDED RELEASE ORAL ONCE
Refills: 0 | Status: DISCONTINUED | OUTPATIENT
Start: 2020-10-12 | End: 2020-10-12

## 2020-10-12 RX ORDER — HYDRALAZINE HCL 50 MG
25 TABLET ORAL THREE TIMES A DAY
Refills: 0 | Status: DISCONTINUED | OUTPATIENT
Start: 2020-10-12 | End: 2020-10-12

## 2020-10-12 RX ORDER — CHOLECALCIFEROL (VITAMIN D3) 125 MCG
800 CAPSULE ORAL DAILY
Refills: 0 | Status: DISCONTINUED | OUTPATIENT
Start: 2020-10-12 | End: 2020-10-13

## 2020-10-12 RX ORDER — ONDANSETRON 8 MG/1
4 TABLET, FILM COATED ORAL EVERY 6 HOURS
Refills: 0 | Status: DISCONTINUED | OUTPATIENT
Start: 2020-10-12 | End: 2020-10-13

## 2020-10-12 RX ORDER — CEFAZOLIN SODIUM 1 G
2000 VIAL (EA) INJECTION EVERY 8 HOURS
Refills: 0 | Status: COMPLETED | OUTPATIENT
Start: 2020-10-13 | End: 2020-10-13

## 2020-10-12 RX ORDER — ASPIRIN/CALCIUM CARB/MAGNESIUM 324 MG
81 TABLET ORAL DAILY
Refills: 0 | Status: DISCONTINUED | OUTPATIENT
Start: 2020-10-12 | End: 2020-10-13

## 2020-10-12 RX ORDER — SENNA PLUS 8.6 MG/1
2 TABLET ORAL AT BEDTIME
Refills: 0 | Status: DISCONTINUED | OUTPATIENT
Start: 2020-10-12 | End: 2020-10-13

## 2020-10-12 RX ORDER — BUPROPION HYDROCHLORIDE 150 MG/1
150 TABLET, EXTENDED RELEASE ORAL DAILY
Refills: 0 | Status: DISCONTINUED | OUTPATIENT
Start: 2020-10-12 | End: 2020-10-13

## 2020-10-12 RX ORDER — FAMOTIDINE 10 MG/ML
20 INJECTION INTRAVENOUS
Refills: 0 | Status: DISCONTINUED | OUTPATIENT
Start: 2020-10-12 | End: 2020-10-13

## 2020-10-12 RX ORDER — SODIUM CHLORIDE 9 MG/ML
1000 INJECTION, SOLUTION INTRAVENOUS
Refills: 0 | Status: DISCONTINUED | OUTPATIENT
Start: 2020-10-12 | End: 2020-10-12

## 2020-10-12 RX ORDER — ENOXAPARIN SODIUM 100 MG/ML
30 INJECTION SUBCUTANEOUS EVERY 24 HOURS
Refills: 0 | Status: DISCONTINUED | OUTPATIENT
Start: 2020-10-12 | End: 2020-10-13

## 2020-10-12 RX ORDER — CALCIUM CARBONATE 500(1250)
1 TABLET ORAL DAILY
Refills: 0 | Status: DISCONTINUED | OUTPATIENT
Start: 2020-10-12 | End: 2020-10-13

## 2020-10-12 RX ORDER — MEPERIDINE HYDROCHLORIDE 50 MG/ML
12.5 INJECTION INTRAMUSCULAR; INTRAVENOUS; SUBCUTANEOUS
Refills: 0 | Status: DISCONTINUED | OUTPATIENT
Start: 2020-10-12 | End: 2020-10-12

## 2020-10-12 RX ORDER — TAMSULOSIN HYDROCHLORIDE 0.4 MG/1
0.4 CAPSULE ORAL AT BEDTIME
Refills: 0 | Status: DISCONTINUED | OUTPATIENT
Start: 2020-10-12 | End: 2020-10-13

## 2020-10-12 RX ORDER — ONDANSETRON 8 MG/1
4 TABLET, FILM COATED ORAL ONCE
Refills: 0 | Status: DISCONTINUED | OUTPATIENT
Start: 2020-10-12 | End: 2020-10-12

## 2020-10-12 RX ADMIN — MORPHINE SULFATE 2 MILLIGRAM(S): 50 CAPSULE, EXTENDED RELEASE ORAL at 10:18

## 2020-10-12 RX ADMIN — MORPHINE SULFATE 2 MILLIGRAM(S): 50 CAPSULE, EXTENDED RELEASE ORAL at 13:46

## 2020-10-12 RX ADMIN — Medication 81 MILLIGRAM(S): at 11:23

## 2020-10-12 RX ADMIN — ISOSORBIDE MONONITRATE 30 MILLIGRAM(S): 60 TABLET, EXTENDED RELEASE ORAL at 11:23

## 2020-10-12 RX ADMIN — ATORVASTATIN CALCIUM 80 MILLIGRAM(S): 80 TABLET, FILM COATED ORAL at 22:49

## 2020-10-12 RX ADMIN — MORPHINE SULFATE 2 MILLIGRAM(S): 50 CAPSULE, EXTENDED RELEASE ORAL at 05:45

## 2020-10-12 RX ADMIN — SODIUM CHLORIDE 50 MILLILITER(S): 9 INJECTION INTRAMUSCULAR; INTRAVENOUS; SUBCUTANEOUS at 22:45

## 2020-10-12 RX ADMIN — HEPARIN SODIUM 5000 UNIT(S): 5000 INJECTION INTRAVENOUS; SUBCUTANEOUS at 05:45

## 2020-10-12 RX ADMIN — Medication 1 TABLET(S): at 11:24

## 2020-10-12 RX ADMIN — MORPHINE SULFATE 2 MILLIGRAM(S): 50 CAPSULE, EXTENDED RELEASE ORAL at 05:57

## 2020-10-12 RX ADMIN — LISINOPRIL 20 MILLIGRAM(S): 2.5 TABLET ORAL at 05:45

## 2020-10-12 RX ADMIN — MORPHINE SULFATE 2 MILLIGRAM(S): 50 CAPSULE, EXTENDED RELEASE ORAL at 10:55

## 2020-10-12 RX ADMIN — Medication 800 UNIT(S): at 11:23

## 2020-10-12 RX ADMIN — SODIUM CHLORIDE 75 MILLILITER(S): 9 INJECTION, SOLUTION INTRAVENOUS at 01:12

## 2020-10-12 RX ADMIN — FAMOTIDINE 20 MILLIGRAM(S): 10 INJECTION INTRAVENOUS at 05:45

## 2020-10-12 RX ADMIN — Medication 240 MILLIGRAM(S): at 05:46

## 2020-10-12 RX ADMIN — MORPHINE SULFATE 2 MILLIGRAM(S): 50 CAPSULE, EXTENDED RELEASE ORAL at 14:23

## 2020-10-12 RX ADMIN — TAMSULOSIN HYDROCHLORIDE 0.4 MILLIGRAM(S): 0.4 CAPSULE ORAL at 22:49

## 2020-10-12 RX ADMIN — SODIUM CHLORIDE 120 MILLILITER(S): 9 INJECTION, SOLUTION INTRAVENOUS at 19:32

## 2020-10-12 RX ADMIN — BUPROPION HYDROCHLORIDE 150 MILLIGRAM(S): 150 TABLET, EXTENDED RELEASE ORAL at 11:23

## 2020-10-12 NOTE — PROGRESS NOTE ADULT - SUBJECTIVE AND OBJECTIVE BOX
JOSEPH POPE  85y, Male  Allergy: No Known Allergies    Hospital Day: 1d    Patient seen and examined earlier today. States that his pain is well controlled, otherwise feeling well. Per Ortho team would like medical preop evaluation for surgery. BEULAH.     PMH/PSH:  PAST MEDICAL & SURGICAL HISTORY:  Dyslipidemia    High cholesterol    Hypertension, unspecified type    H/O hernia repair        LAST 24-Hr EVENTS:    VITALS:  T(F): 97.6 (10-12-20 @ 05:03), Max: 97.7 (10-11-20 @ 18:45)  HR: 69 (10-12-20 @ 05:03)  BP: 176/74 (10-12-20 @ 05:03) (143/65 - 183/79)  RR: 16 (10-12-20 @ 05:03)  SpO2: 96% (10-11-20 @ 17:57)        TESTS & MEASUREMENTS:  Weight (Kg): 67.9 (10-11-20 @ 18:45)  BMI (kg/m2): 24.2 (10-11)                          14.2   9.53  )-----------( 173      ( 12 Oct 2020 07:27 )             41.5     PT/INR - ( 11 Oct 2020 16:00 )   PT: 13.10 sec;   INR: 1.14 ratio         PTT - ( 11 Oct 2020 16:00 )  PTT:29.3 sec  10-12    136  |  102  |  12  ----------------------------<  102<H>  3.6   |  26  |  0.8    Ca    8.7      12 Oct 2020 07:27    TPro  6.2  /  Alb  4.0  /  TBili  0.3  /  DBili  x   /  AST  19  /  ALT  18  /  AlkPhos  88  10-11    LIVER FUNCTIONS - ( 11 Oct 2020 16:00 )  Alb: 4.0 g/dL / Pro: 6.2 g/dL / ALK PHOS: 88 U/L / ALT: 18 U/L / AST: 19 U/L / GGT: x                               RADIOLOGY, ECG, & ADDITIONAL TESTS:      RECENT DIAGNOSTIC ORDERS:  Antibody Screen Interpretation: 07:27 (10-12-20 @ 08:04)  Vitamin D, 1, 25-Dihydroxy: AM Sched. Collection: 12-Oct-2020 04:30 (10-11-20 @ 19:02)  Diet, NPO after Midnight:      NPO Start Date: 11-Oct-2020,   NPO Start Time: 23:59 (10-11-20 @ 19:00)  Diet, DASH/TLC:   Sodium & Cholesterol Restricted (10-11-20 @ 19:00)  Type + Screen: AM  Sched. Collection:12-Oct-2020 04:30 (10-11-20 @ 19:00)  COVID-19  Antibody - for prior infection screening: Routine (10-11-20 @ 18:47)  ABO Rh Confirmatory Specimen: STAT  Addl Info: Conditional: ABO Rh Confirmatory Specimen (10-11-20 @ 15:28)      MEDICATIONS:  MEDICATIONS  (STANDING):  aspirin  chewable 81 milliGRAM(s) Oral daily  atorvastatin 80 milliGRAM(s) Oral at bedtime  buPROPion XL . 150 milliGRAM(s) Oral daily  calcium carbonate   1250 mG (OsCal) 1 Tablet(s) Oral daily  cholecalciferol 800 Unit(s) Oral daily  cloNIDine 0.2 milliGRAM(s) Oral once  cloNIDine 0.1 milliGRAM(s) Oral every 8 hours  diltiazem    milliGRAM(s) Oral daily  famotidine    Tablet 20 milliGRAM(s) Oral two times a day  heparin   Injectable 5000 Unit(s) SubCutaneous every 12 hours  influenza   Vaccine 0.5 milliLiter(s) IntraMuscular once  isosorbide   mononitrate ER Tablet (IMDUR) 30 milliGRAM(s) Oral daily  lactated ringers. 1000 milliLiter(s) (75 mL/Hr) IV Continuous <Continuous>  lisinopril 20 milliGRAM(s) Oral daily  propranolol 20 milliGRAM(s) Oral daily  tamsulosin 0.4 milliGRAM(s) Oral at bedtime    MEDICATIONS  (PRN):  morphine  - Injectable 2 milliGRAM(s) IV Push every 4 hours PRN Severe Pain (7 - 10)      HOME MEDICATIONS:  buPROPion 75 mg oral tablet (10-11)  famotidine 20 mg oral tablet (10-11)  Flomax 0.4 mg oral capsule (10-11)  isosorbide (10-11)  rosuvastatin 20 mg oral tablet (10-11)      PHYSICAL EXAM:   GENERAL:  84y/o Male NAD, resting comfortably.  HEAD:  Atraumatic, Normocephalic  EYES: EOMI, PERRLA, conjunctiva and sclera clear  NECK: Supple, No JVD, no cervical lymphadenopathy, non-tender  CHEST/LUNG: Clear to auscultation bilaterally; No wheeze, rhonchi, or rales  HEART: Regular rate and rhythm; S1&S2  ABDOMEN: Soft, Nontender, Nondistended x 4 quadrants; Bowel sounds present  EXTREMITIES:  +tenderness along right lateral hip   decreased ROM at the hip 2/2 pain.  Pelvis stable.  Peripheral Pulses Present, No clubbing, no cyanosis, or no edema, no calf tenderness  PSYCH: AAOx3, cooperative, appropriate  NEUROLOGY: WNL  SKIN: WNL

## 2020-10-12 NOTE — PROGRESS NOTE ADULT - ASSESSMENT
84 y/o male Right Displaced RT Hip Fx      #Right Displaced Femoral Neck Fracture   Bedrest  Plan for OR today per Dr. Shane   NPO @ midnight  Pain Management  Physical Therapy after     #Preoperative Risk Stratification   - patient on asa, statin, diltiazem, propanolol, lisinopril for HTN and HLD   - discussed with wife extensively, patient follows with Dr. Ha for his htn. Has never had a cardiac catheterization/stent. Has had various tests in the past for his heart, and they have always been negative. Prior to this fall, patient could walk 10 blocks without chest pain or dyspnea.   - No echo in the chart, EKG w/o acute ischemic change   - RCRI of 0   - Patient has a low 30 day risk of death/MI/Cardiac arrest  - Low risk patient for moderate risk procedure   - No further testing needed at this time     #HTN  #HLD  - continue home meds       DVT ppx: holding for OR     #Progress Note Handoff:  Pending (specify):  OR for R hip   Family discussion: d/w wife   Disposition: unknown       Gabriela Abebe DO 86 y/o male Right Displaced RT Hip Fx      #Right Displaced Femoral Neck Fracture   Bedrest  Plan for OR today per Dr. Shane   NPO @ midnight  Pain Management  Physical Therapy after     #Preoperative Risk Stratification   - patient on asa, statin, diltiazem, propanolol, lisinopril for HTN and HLD   - discussed with wife extensively, patient follows with Dr. Ha for his htn. Has never had a cardiac catheterization/stent. Has had various tests in the past for his heart, and they have always been negative. Prior to this fall, patient could walk 10 blocks without chest pain or dyspnea.   - pt is a smoker, advised to discontinue   - No echo in the chart, EKG w/o acute ischemic change   - RCRI of 0   - Patient has a low 30 day risk of death/MI/Cardiac arrest  - Low risk patient for moderate risk procedure   - No further testing needed at this time     #HTN  #HLD  - continue home meds       DVT ppx: holding for OR     #Progress Note Handoff:  Pending (specify):  OR for R hip   Family discussion: d/w wife   Disposition: unknown       Gabriela Abebe DO

## 2020-10-12 NOTE — PRE-OP CHECKLIST - SELECT TESTS ORDERED
INR/COVID/CMP/Type and Screen/BMP/CBC/PT/PTT/POCT Blood Glucose/91 Type and Screen/CXR/CMP/PT/PTT/BMP/CBC/91/INR/EKG/POCT Blood Glucose/COVID

## 2020-10-13 ENCOUNTER — TRANSCRIPTION ENCOUNTER (OUTPATIENT)
Age: 85
End: 2020-10-13

## 2020-10-13 VITALS
TEMPERATURE: 98 F | HEART RATE: 71 BPM | DIASTOLIC BLOOD PRESSURE: 70 MMHG | RESPIRATION RATE: 16 BRPM | SYSTOLIC BLOOD PRESSURE: 159 MMHG

## 2020-10-13 LAB
ANION GAP SERPL CALC-SCNC: 12 MMOL/L — SIGNIFICANT CHANGE UP (ref 7–14)
BUN SERPL-MCNC: 18 MG/DL — SIGNIFICANT CHANGE UP (ref 10–20)
CALCIUM SERPL-MCNC: 8.6 MG/DL — SIGNIFICANT CHANGE UP (ref 8.5–10.1)
CHLORIDE SERPL-SCNC: 100 MMOL/L — SIGNIFICANT CHANGE UP (ref 98–110)
CO2 SERPL-SCNC: 24 MMOL/L — SIGNIFICANT CHANGE UP (ref 17–32)
CREAT SERPL-MCNC: 1 MG/DL — SIGNIFICANT CHANGE UP (ref 0.7–1.5)
GLUCOSE SERPL-MCNC: 198 MG/DL — HIGH (ref 70–99)
HCT VFR BLD CALC: 39.9 % — LOW (ref 42–52)
HGB BLD-MCNC: 13.5 G/DL — LOW (ref 14–18)
MCHC RBC-ENTMCNC: 31.3 PG — HIGH (ref 27–31)
MCHC RBC-ENTMCNC: 33.8 G/DL — SIGNIFICANT CHANGE UP (ref 32–37)
MCV RBC AUTO: 92.6 FL — SIGNIFICANT CHANGE UP (ref 80–94)
NRBC # BLD: 0 /100 WBCS — SIGNIFICANT CHANGE UP (ref 0–0)
PLATELET # BLD AUTO: 165 K/UL — SIGNIFICANT CHANGE UP (ref 130–400)
POTASSIUM SERPL-MCNC: 4.1 MMOL/L — SIGNIFICANT CHANGE UP (ref 3.5–5)
POTASSIUM SERPL-SCNC: 4.1 MMOL/L — SIGNIFICANT CHANGE UP (ref 3.5–5)
RBC # BLD: 4.31 M/UL — LOW (ref 4.7–6.1)
RBC # FLD: 13.2 % — SIGNIFICANT CHANGE UP (ref 11.5–14.5)
SODIUM SERPL-SCNC: 136 MMOL/L — SIGNIFICANT CHANGE UP (ref 135–146)
VIT D25+D1,25 OH+D1,25 PNL SERPL-MCNC: 36.3 PG/ML — SIGNIFICANT CHANGE UP (ref 19.9–79.3)
WBC # BLD: 12.76 K/UL — HIGH (ref 4.8–10.8)
WBC # FLD AUTO: 12.76 K/UL — HIGH (ref 4.8–10.8)

## 2020-10-13 PROCEDURE — 99232 SBSQ HOSP IP/OBS MODERATE 35: CPT

## 2020-10-13 RX ORDER — CHOLECALCIFEROL (VITAMIN D3) 125 MCG
800 CAPSULE ORAL
Qty: 30 | Refills: 0
Start: 2020-10-13 | End: 2020-11-11

## 2020-10-13 RX ORDER — ASPIRIN/CALCIUM CARB/MAGNESIUM 324 MG
1 TABLET ORAL
Qty: 60 | Refills: 0
Start: 2020-10-13 | End: 2020-11-11

## 2020-10-13 RX ORDER — CALCIUM CARBONATE 500(1250)
1 TABLET ORAL
Qty: 30 | Refills: 0
Start: 2020-10-13 | End: 2020-11-11

## 2020-10-13 RX ADMIN — Medication 650 MILLIGRAM(S): at 01:19

## 2020-10-13 RX ADMIN — BUPROPION HYDROCHLORIDE 150 MILLIGRAM(S): 150 TABLET, EXTENDED RELEASE ORAL at 12:01

## 2020-10-13 RX ADMIN — Medication 240 MILLIGRAM(S): at 10:32

## 2020-10-13 RX ADMIN — LISINOPRIL 20 MILLIGRAM(S): 2.5 TABLET ORAL at 05:58

## 2020-10-13 RX ADMIN — Medication 650 MILLIGRAM(S): at 12:01

## 2020-10-13 RX ADMIN — Medication 100 MILLIGRAM(S): at 00:47

## 2020-10-13 RX ADMIN — Medication 81 MILLIGRAM(S): at 12:01

## 2020-10-13 RX ADMIN — ISOSORBIDE MONONITRATE 30 MILLIGRAM(S): 60 TABLET, EXTENDED RELEASE ORAL at 12:01

## 2020-10-13 RX ADMIN — Medication 1 TABLET(S): at 12:01

## 2020-10-13 RX ADMIN — ENOXAPARIN SODIUM 30 MILLIGRAM(S): 100 INJECTION SUBCUTANEOUS at 00:50

## 2020-10-13 RX ADMIN — Medication 650 MILLIGRAM(S): at 12:08

## 2020-10-13 RX ADMIN — SODIUM CHLORIDE 50 MILLILITER(S): 9 INJECTION INTRAMUSCULAR; INTRAVENOUS; SUBCUTANEOUS at 07:00

## 2020-10-13 RX ADMIN — Medication 650 MILLIGRAM(S): at 05:50

## 2020-10-13 RX ADMIN — Medication 650 MILLIGRAM(S): at 06:20

## 2020-10-13 RX ADMIN — FAMOTIDINE 20 MILLIGRAM(S): 10 INJECTION INTRAVENOUS at 05:50

## 2020-10-13 RX ADMIN — Medication 650 MILLIGRAM(S): at 00:49

## 2020-10-13 RX ADMIN — Medication 100 MILLIGRAM(S): at 07:47

## 2020-10-13 RX ADMIN — Medication 800 UNIT(S): at 12:02

## 2020-10-13 NOTE — DISCHARGE NOTE PROVIDER - CARE PROVIDER_API CALL
Sean Huber  INTERNAL MEDICINE  [address] [place] [address]  Phone: [phone]  Fax: [phone]  Follow Up Time: 1 week    Osbaldo Scott  ORTHOPAEDIC SURGERY  [address] [place] [address]  Phone: [phone]  Fax: [phone]  Follow Up Time: 1 week   Sean Huber  INTERNAL MEDICINE  [address] [place] [address]  Phone: [phone]  Fax: [phone]  Follow Up Time: 1 week    Osbaldo Scott  ORTHOPAEDIC SURGERY  [address] [place] [address]  Phone: [phone]  Fax: [phone]  Follow Up Time: 2 weeks

## 2020-10-13 NOTE — PHYSICAL THERAPY INITIAL EVALUATION ADULT - ACTIVE RANGE OF MOTION EXAMINATION, REHAB EVAL
BLE joints WFL and painfree AAROM/AROM with (R) hip AAROM flexion 0-90 degrees and hip abduction 0-45 degrees; Please refer to OT milad for BUE

## 2020-10-13 NOTE — PHYSICAL THERAPY INITIAL EVALUATION ADULT - LEVEL OF INDEPENDENCE: SIT/STAND, REHAB EVAL
[FreeTextEntry8] : presents for eval of worsening L shoulder region pain, extending to L arm and inability to fully abduct her L arm. she says pain and worsening ROM have been progressing over 2 months. in 12/18 she was prescribed levofloxacin by her urologist , and after taking 3 doses she developed significant pain in both shoulder - she was advised to stop rx immediately which she did at that time. she has been noting progressive L shoulder pain , and now is concerned by inability to fully raise her arm. R shoulder mild pain has improved. no swelling of UE or shoulder region. 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 she is now on Aranesp inj and monitoring Hgb. she feels mildly improved. \par \par HTN controlled on rx  contact guard

## 2020-10-13 NOTE — DISCHARGE NOTE NURSING/CASE MANAGEMENT/SOCIAL WORK - NSDCPEWEB_GEN_ALL_CORE
[place] website --- www."[employer]/[facility] website --- [email or web address]

## 2020-10-13 NOTE — PHYSICAL THERAPY INITIAL EVALUATION ADULT - IMPAIRMENTS CONTRIBUTING TO GAIT DEVIATIONS, PT EVAL
decreased endurance/narrow base of support/impaired postural control/decreased strength/impaired balance/decreased ROM

## 2020-10-13 NOTE — DISCHARGE NOTE PROVIDER - CARE PROVIDERS DIRECT ADDRESSES
[email or web address]

## 2020-10-13 NOTE — PROGRESS NOTE ADULT - SUBJECTIVE AND OBJECTIVE BOX
ORTHO PROGRESS NOTE       85y Male POD #  1    S/P right hip hemiarthroplasty     Patient seen and examined at bedside . The patient is awake and alert in NAD. No complaints of chest pain, SOB, N/V.    PAST MEDICAL & SURGICAL HISTORY:  Dyslipidemia    High cholesterol    Hypertension, unspecified type    H/O hernia repair          MEDICATIONS  (STANDING):  acetaminophen   Tablet .. 650 milliGRAM(s) Oral every 6 hours  aspirin  chewable 81 milliGRAM(s) Oral daily  atorvastatin 80 milliGRAM(s) Oral at bedtime  buPROPion XL . 150 milliGRAM(s) Oral daily  calcium carbonate   1250 mG (OsCal) 1 Tablet(s) Oral daily  cholecalciferol 800 Unit(s) Oral daily  diltiazem    milliGRAM(s) Oral daily  enoxaparin Injectable 30 milliGRAM(s) SubCutaneous every 24 hours  famotidine    Tablet 20 milliGRAM(s) Oral two times a day  isosorbide   mononitrate ER Tablet (IMDUR) 30 milliGRAM(s) Oral daily  lisinopril 20 milliGRAM(s) Oral daily  propranolol 20 milliGRAM(s) Oral daily  sodium chloride 0.9%. 1000 milliLiter(s) (50 mL/Hr) IV Continuous <Continuous>  tamsulosin 0.4 milliGRAM(s) Oral at bedtime    MEDICATIONS  (PRN):  hydrALAZINE 25 milliGRAM(s) Oral three times a day PRN Systolic blood pressure >  morphine  - Injectable 2 milliGRAM(s) IV Push every 4 hours PRN Severe Pain (7 - 10)  ondansetron Injectable 4 milliGRAM(s) IV Push every 6 hours PRN Nausea and/or Vomiting  senna 2 Tablet(s) Oral at bedtime PRN Constipation  traMADol 50 milliGRAM(s) Oral every 6 hours PRN Moderate Pain (4 - 6)        Vital Signs Last 24 Hrs  T(C): 35.9 (13 Oct 2020 04:00), Max: 36.7 (12 Oct 2020 18:54)  T(F): 96.7 (13 Oct 2020 04:00), Max: 98.1 (12 Oct 2020 18:54)  HR: 74 (13 Oct 2020 04:00) (51 - 74)  BP: 114/67 (13 Oct 2020 04:00) (94/52 - 176/74)  BP(mean): 69 (12 Oct 2020 18:54) (69 - 69)  RR: 16 (13 Oct 2020 04:00) (10 - 18)  SpO2: 96% (12 Oct 2020 22:10) (95% - 98%)                          14.2   9.53  )-----------( 173      ( 12 Oct 2020 07:27 )             41.5     10-13    136  |  100  |  18  ----------------------------<  198<H>  4.1   |  24  |  1.0    Ca    8.6      13 Oct 2020 06:58    TPro  6.2  /  Alb  4.0  /  TBili  0.3  /  DBili  x   /  AST  19  /  ALT  18  /  AlkPhos  88  10-11    PT/INR - ( 11 Oct 2020 16:00 )   PT: 13.10 sec;   INR: 1.14 ratio         PTT - ( 11 Oct 2020 16:00 )  PTT:29.3 sec      DVT ppx : lovenox         PE:  The patient was seen and examined at bedside          A&OX3, NAD          dressing C/D/I          Compartments soft         NVI, SILT           A/P:     1.     POD # 1      s/p   right hip hemiarthroplasty                    OOB to Chair            Physical Therapy- wbat            Pain control - per pain protocol            Incentive Spirometry            DVT Prophylaxis - aspirin bid            discharge planning

## 2020-10-13 NOTE — DISCHARGE NOTE PROVIDER - NSDCCPCAREPLAN_GEN_ALL_CORE_FT
PRINCIPAL DISCHARGE DIAGNOSIS  Diagnosis: Femoral neck fracture  Assessment and Plan of Treatment: s/p hemiarthoplasty Right hip   -follow up with orthopedics as outp   -home service for PT and OT arranged       PRINCIPAL DISCHARGE DIAGNOSIS  Diagnosis: Femoral neck fracture  Assessment and Plan of Treatment: s/p hemiarthoplasty Right hip   -follow up with Orthopedics in 2 weeks  -home service for PT and OT arranged  -continue aspirin 81mg twice daily for 6 weeks      SECONDARY DISCHARGE DIAGNOSES  Diagnosis: Osteoporosis  Assessment and Plan of Treatment: - started on Calcium/ Vitamin D supplement   - please follow up with PCP

## 2020-10-13 NOTE — PHYSICAL THERAPY INITIAL EVALUATION ADULT - ADDITIONAL COMMENTS
Per patient, has one stoop with no rail to enter home; Per , patient has cane and walker at home but has not been using device and patient will stay with son in [place] initially upon discharge

## 2020-10-13 NOTE — DISCHARGE NOTE PROVIDER - HOSPITAL COURSE
85 year old male hx of left acetabular fx ( nonsurgical ) , HTN, HLD, on 81 mg ASA daily presents to the ED   for evaluation right lateral hip pain s/p ground level fall just prior to arrival. In ED found to have R Femoral Neck Fracture.   Pt seen by orthopedic  pt s/p hemiarthroplasty of R hip POD# 1  Pt seen by physical therapy and occupational therapy, pt ambulated   Pt to be discharged home with home service and to follow up with orthopedics as outpt   HPI:   85 year old male hx of left acetabular fx NO SX REPAIR, HTN, HLD, on 81 mg ASA daily presents to the ED for evaluation right lateral hip pain s/p ground level fall just prior to arrival.   Pt was walking out to his backyard to smoke wearing socks only when his sock caught on the metal door frame, causing him to fall onto his right side. Denies head injury, denies neck or back pain at present time and denies LOC.    Has been unable to bear weight on this extremity since.   Denies headaches, vision changes, neck pain/stiffness, chest pain, shortness of breath, back pain, abd pain, n/v, dizziness, lightheadedness, behavioral/mental status change, paresthesias, numbness, weakness.  Patient states at present time pain is 5/10 when he moves.      Hospital Course:   85 year old male hx of left acetabular fx ( nonsurgical ) , HTN, HLD, on 81 mg ASA daily presents to the ED   for evaluation right lateral hip pain s/p ground level fall just prior to arrival. In ED found to have R Femoral Neck Fracture.   Pt seen by orthopedic  pt s/p hemiarthroplasty of R hip POD# 1  Pt seen by physical therapy and occupational therapy, pt ambulated   Pt to be discharged home with home service and to follow up with orthopedics as outpt       Physical Exam: GENERAL:  86y/o Male NAD, resting comfortably.  HEAD:  Atraumatic, Normocephalic  EYES: EOMI, PERRLA, conjunctiva and sclera clear  NECK: Supple, No JVD, no cervical lymphadenopathy, non-tender  CHEST/LUNG: Clear to auscultation bilaterally; No wheeze, rhonchi, or rales  HEART: Regular rate and rhythm; S1&S2  ABDOMEN: Soft, Nontender, Nondistended x 4 quadrants; Bowel sounds present  EXTREMITIES: normal ROM b/l LE,  Peripheral Pulses Present, No clubbing, no cyanosis, or no edema, no calf tenderness  PSYCH: AAOx3, cooperative, appropriate  NEUROLOGY: WNL  SKIN: WNL      Plan:   Discharge  home with home PT   Continue ASA 81mg BID for 6 weeks   Follow up with Dr. Shane in 2 weeks   Continue Calcium/Vitamin D supplement   Follow up with PCP for osteoporosis   HPI:   85 year old male hx of left acetabular fx NO SX REPAIR, HTN, HLD, on 81 mg ASA daily presents to the ED for evaluation right lateral hip pain s/p ground level fall just prior to arrival.   Pt was walking out to his backyard to smoke wearing socks only when his sock caught on the metal door frame, causing him to fall onto his right side. Denies head injury, denies neck or back pain at present time and denies LOC.    Has been unable to bear weight on this extremity since.   Denies headaches, vision changes, neck pain/stiffness, chest pain, shortness of breath, back pain, abd pain, n/v, dizziness, lightheadedness, behavioral/mental status change, paresthesias, numbness, weakness.  Patient states at present time pain is 5/10 when he moves.      Hospital Course:   85 year old male hx of left acetabular fx ( nonsurgical ) , HTN, HLD, on 81 mg ASA daily presents to the ED   for evaluation right lateral hip pain s/p ground level fall just prior to arrival. In ED found to have R Femoral Neck Fracture.   Pt seen by orthopedic  pt s/p hemiarthroplasty of R hip POD# 1  Pt seen by physical therapy and occupational therapy, pt ambulated   Pt to be discharged home with home service and to follow up with orthopedics as outpt       Physical Exam: GENERAL:  84y/o Male NAD, resting comfortably.  HEAD:  Atraumatic, Normocephalic  EYES: EOMI, PERRLA, conjunctiva and sclera clear  NECK: Supple, No JVD, no cervical lymphadenopathy, non-tender  CHEST/LUNG: Clear to auscultation bilaterally; No wheeze, rhonchi, or rales  HEART: Regular rate and rhythm; S1&S2  ABDOMEN: Soft, Nontender, Nondistended x 4 quadrants; Bowel sounds present  EXTREMITIES: normal ROM b/l LE,  Peripheral Pulses Present, No clubbing, no cyanosis, or no edema, no calf tenderness  PSYCH: AAOx3, cooperative, appropriate  NEUROLOGY: WNL  SKIN: WNL      Plan:   Discharge  home with home PT   Continue ASA 81mg BID for 6 weeks   Follow up with Dr. Shane in 2 weeks   Continue Calcium/Vitamin D supplement   Follow up with PCP for osteoporosis      Additional Diagnosis     #Pathological Fracture  - related to underlying osteoporosis     Gabriela Abebe DO

## 2020-10-13 NOTE — PHYSICAL THERAPY INITIAL EVALUATION ADULT - GAIT DEVIATIONS NOTED, PT EVAL
decreased weight-shifting ability/stooped posture, dec heel strike/pushoff /stance on RLE/decreased step length

## 2020-10-13 NOTE — DISCHARGE NOTE PROVIDER - PROVIDER TOKENS
PROVIDER:[TOKEN:[[id number]],FOLLOWUP:[1 week]],PROVIDER:[TOKEN:[[id number]],FOLLOWUP:[1 week]] PROVIDER:[TOKEN:[[id number]],FOLLOWUP:[1 week]],PROVIDER:[TOKEN:[[id number]],FOLLOWUP:[2 weeks]]

## 2020-10-13 NOTE — PHYSICAL THERAPY INITIAL EVALUATION ADULT - BED MOBILITY LIMITATIONS, REHAB EVAL
Patient encountered already out of bed in chair, No apparent distress. Per nursing, required assistx1 for safety.

## 2020-10-13 NOTE — PHYSICAL THERAPY INITIAL EVALUATION ADULT - CRITERIA FOR SKILLED THERAPEUTIC INTERVENTIONS
therapy frequency/impairments found/rehab potential/functional limitations in following categories/predicted duration of therapy intervention

## 2020-10-13 NOTE — PHYSICAL THERAPY INITIAL EVALUATION ADULT - PERTINENT HX OF CURRENT PROBLEM, REHAB EVAL
Admitted for (R) hip pain s/p fall, found to have (R) femoral neck fracture where patient underwent (R) hemiarthroplasty under regional anesthesia on 10/12/20

## 2020-10-13 NOTE — PHYSICAL THERAPY INITIAL EVALUATION ADULT - MANUAL MUSCLE TESTING RESULTS, REHAB EVAL
Patient request refill of Trileptal 150mg BID.
LLE ms strength grossly graded 3+ to 4-/5; (R) hip/knee 3-/5; (R) ankle 3/5; Please refer to OT milad for BUE

## 2020-10-13 NOTE — DISCHARGE NOTE NURSING/CASE MANAGEMENT/SOCIAL WORK - PATIENT PORTAL LINK FT
You can access the FollowMyHealth Patient Portal offered by [facility] by registering at the following website: [email or web address]. By joining [facility]’s FollowMyHealth portal, you will also be able to view your health information using other applications (apps) compatible with our system.

## 2020-10-13 NOTE — OCCUPATIONAL THERAPY INITIAL EVALUATION ADULT - RANGE OF MOTION EXAMINATION
no Active Assistive ROM deficits were identified/no Active ROM deficits were identified/no Passive ROM deficits were identified

## 2020-10-13 NOTE — OCCUPATIONAL THERAPY INITIAL EVALUATION ADULT - NS ASR BATHING EQUIP NEEDS
"Subjective:      Kenroy Chen is a 15 y.o. male who presents with Foot Pain (fell backwards off skate board yesterday)            Foot Problem  This is a new problem. The current episode started yesterday. The problem has been unchanged. Pertinent negatives include no joint swelling or numbness. The symptoms are aggravated by walking and bending. He has tried rest for the symptoms.   Patient notes fell while skateboarding; possible left foot hyper plantarflexion mechanism.  No other trauma; no significant local prior injury    Review of Systems   Musculoskeletal: Negative for joint swelling.   Neurological: Negative for sensory change, focal weakness and numbness.     Past Medical History:   Diagnosis Date   • Otitis      Past Surgical History:   Procedure Laterality Date   • MYRINGOTOMY  4/7/08    Performed by TIMOTHY BUTLER at SURGERY SAME DAY [facility]      Allergy:  Nkda [no known drug allergy]     Current Outpatient Medications:   •  Multiple Vitamin (MULTI VITAMIN PO), Take  by mouth., Taking  •  Cholecalciferol (VITAMIN D PO), Take  by mouth., Taking  •  naproxen, 500 mg, Oral, BID WITH MEALS (Patient not taking: Reported on 8/29/2020), Not Taking  •  Multiple Vitamin (MULTIVITAMIN PO), Take  by mouth., Not Taking   family history includes Cancer in his maternal grandmother and mother; Hypertension in his maternal grandmother.   Social History     Tobacco Use   • Smoking status: Never Smoker   • Smokeless tobacco: Never Used   Substance Use Topics   • Alcohol use: No   • Drug use: No         Objective:     /72 (BP Location: Right arm, Patient Position: Sitting, BP Cuff Size: Adult)   Pulse 82   Temp 36.9 °C (98.5 °F) (Temporal)   Resp 16   Ht 1.739 m (5' 8.47\")   Wt 81.2 kg (179 lb)   SpO2 97%   BMI 26.85 kg/m²      Physical Exam  Constitutional:       General: He is not in acute distress.     Appearance: Normal appearance. He is well-developed.   Cardiovascular:      Pulses:           " Dorsalis pedis pulses are 2+ on the left side.      Comments: Distal capillary refill intact.  Musculoskeletal:      Left ankle: Normal.      Left lower leg: Normal.      Left foot: Normal range of motion. Bony tenderness present. No crepitus or deformity.        Feet:       Comments: No tendon function deficit.   Skin:     General: Skin is warm and dry.      Findings: No rash or wound.   Neurological:      Mental Status: He is alert.      Comments: Distal sensation to light touch and pressure intact. Distal motor function intact.   Psychiatric:         Behavior: Behavior is cooperative.                 Assessment/Plan:        1. Sprain of left foot, initial encounter  Elevation, ice, OTC analgesia PRN.  Short leg walking boot splint PRN.  Referral to sports medicine if not improving    2. Foot injury, left, initial encounter  - DX-FOOT-COMPLETE 3+ LEFT; Interpretation per radiologist:    No radiographic evidence of acute displaced fracture or subluxation.     transfer tub bench/shower chair

## 2020-10-13 NOTE — DISCHARGE NOTE NURSING/CASE MANAGEMENT/SOCIAL WORK - NSDCPEEMAIL_GEN_ALL_CORE
[facility] for Tobacco Control email [email or web address]

## 2020-10-13 NOTE — DISCHARGE NOTE PROVIDER - NSDCMRMEDTOKEN_GEN_ALL_CORE_FT
aspirin 81 mg oral tablet, chewable: 1 tab(s) orally once a day  buPROPion 75 mg oral tablet:   dilTIAZem 240 mg/24 hours oral capsule, extended release: 1 cap(s) orally once a day  famotidine 20 mg oral tablet: 1 tab(s) orally 2 times a day  Flomax 0.4 mg oral capsule: 1 cap(s) orally once a day  isosorbide:   lisinopril 20 mg oral tablet: 1 tab(s) orally once a day  propranolol 20 mg oral tablet: 1 tab(s) orally once a day  rosuvastatin 20 mg oral tablet: 1 tab(s) orally once a day   aspirin 81 mg oral tablet, chewable: 1 tab(s) orally 2 times a day   buPROPion 75 mg oral tablet:   calcium carbonate 1250 mg (500 mg elemental calcium) oral tablet: 1 tab(s) orally once a day  cholecalciferol oral tablet: 800 unit(s) orally once a day  dilTIAZem 240 mg/24 hours oral capsule, extended release: 1 cap(s) orally once a day  famotidine 20 mg oral tablet: 1 tab(s) orally 2 times a day  Flomax 0.4 mg oral capsule: 1 cap(s) orally once a day  isosorbide:   lisinopril 20 mg oral tablet: 1 tab(s) orally once a day  propranolol 20 mg oral tablet: 1 tab(s) orally once a day  rosuvastatin 20 mg oral tablet: 1 tab(s) orally once a day

## 2020-10-13 NOTE — PHYSICAL THERAPY INITIAL EVALUATION ADULT - GENERAL OBSERVATIONS, REHAB EVAL
10:15-10:45 Chart reviewed. Pt encountered sitting in chair, may be seen by Physical Therapist as confirmed with Nurse. Patient denied pain at rest and ready to walk now; +Aquacel dressing (R) hip/compression socks

## 2020-10-13 NOTE — OCCUPATIONAL THERAPY INITIAL EVALUATION ADULT - GENERAL OBSERVATIONS, REHAB EVAL
8:30-9:00 chart reviewed, per RN pt ok to be seen by OT, pt received in bed IV lock, Aquacel dressing  R hip, agreeable to OT eval

## 2020-10-13 NOTE — DISCHARGE NOTE PROVIDER - NSDCFUADDINST_GEN_ALL_CORE_FT
-follow up with your pcp in 1x week   -follow up with orthopedics   -Keep surgical site clean and dry, may remove dressing in 7  days . Call Dr. Shane if any wound drainage, redness , increasing pain, fevers over 101 or if you have any questions or concerns.     You may shower with the bandage on and once it is removed. Once it is removed  , do not scrub surgical site. Do not apply any lotions/moisturizers/creams to surgical site.   -follow up with your pcp in 1 week   -follow up with orthopedics in 2 weeks  -Keep surgical site clean and dry, may remove dressing in 7  days . Call Dr. Shane if any wound drainage, redness , increasing pain, fevers over 101 or if you have any questions or concerns.     You may shower with the bandage on and once it is removed. Once it is removed  , do not scrub surgical site. Do not apply any lotions/moisturizers/creams to surgical site.

## 2020-10-13 NOTE — DISCHARGE NOTE PROVIDER - NSDCHHNEEDSERVICE_GEN_ALL_CORE
Observation and assessment/Rehabilitation services Wound care and assessment/Observation and assessment/Rehabilitation services

## 2020-10-14 ENCOUNTER — APPOINTMENT (OUTPATIENT)
Dept: CARE COORDINATION | Facility: HOME HEALTH | Age: 85
End: 2020-10-14
Payer: MEDICARE

## 2020-10-14 VITALS — RESPIRATION RATE: 16 BRPM

## 2020-10-14 DIAGNOSIS — Z98.890 OTHER SPECIFIED POSTPROCEDURAL STATES: ICD-10-CM

## 2020-10-14 PROBLEM — E78.5 HYPERLIPIDEMIA, UNSPECIFIED: Chronic | Status: ACTIVE | Noted: 2020-10-11

## 2020-10-14 PROCEDURE — 99024 POSTOP FOLLOW-UP VISIT: CPT

## 2020-10-14 RX ORDER — LOVASTATIN 40 MG/1
40 TABLET ORAL
Refills: 0 | Status: DISCONTINUED | COMMUNITY
End: 2020-10-14

## 2020-10-14 NOTE — PHYSICAL EXAM
[Clear to Auscultation] : lungs were clear to auscultation bilaterally [No Respiratory Distress] : no respiratory distress  [Grossly Normal Strength/Tone] : grossly normal strength/tone [No Edema] : there was no peripheral edema [Normal] : affect was normal and insight and judgment were intact

## 2020-10-16 VITALS — RESPIRATION RATE: 16 BRPM

## 2020-10-16 PROBLEM — Z98.890 S/P HIP ARTHROSCOPY: Status: ACTIVE | Noted: 2020-10-14

## 2020-10-16 NOTE — PHYSICAL EXAM
[No Respiratory Distress] : no respiratory distress  [No Edema] : there was no peripheral edema [Normal] : affect was normal and insight and judgment were intact [No Focal Deficits] : no focal deficits [de-identified] : staples intact, no sds

## 2020-10-16 NOTE — REVIEW OF SYSTEMS
[Negative] : Respiratory [Chest Pain] : no chest pain [Lower Ext Edema] : no lower extremity edema [Shortness Of Breath] : no shortness of breath [FreeTextEntry9] : lt sided hip and lower extremity  pain  10/10 [Dyspnea on Exertion] : not dyspnea on exertion [de-identified] : staples covered with dsd

## 2020-10-16 NOTE — HISTORY OF PRESENT ILLNESS
[Home] : at home, [unfilled] , at the time of the visit. [Verbal consent obtained from patient] : the patient, [unfilled] [Spouse] : spouse [Other Location: e.g. Home (Enter Location, City,State)___] : at [unfilled] [FreeTextEntry1] : pt evaluated for pain post JABIER [de-identified] : This is an 85 year old male enrolled in [facility] with hx of left acetabular fx NO SX REPAIR  PMH includes , HTN, HLD, on 81 mg ASA \par daily presented to the ED for evaluation right lateral hip pain s/p ground level fall just prior to arrival. Pt underwent JABIER and was hospitalized from 10/11/2 0-10/13/20.  Pt was sent home with [facility] and physical therapy.  At time of call, pt was in 10/10 pain .  He had taken tylenol as ordered with no relief.  Pt advised to take advil and use an icepack.  Call placed to Dr Xochitl Duenas to advise of pain level and percocet was added to regimen.  Pt also c/o no BM in 2 days, today is day 3.  Non c/o abd pain, n/v or distention. [place] and Bakari advised.\par \par  \par

## 2020-10-16 NOTE — COUNSELING
[Adequate lighting] : Adequate lighting [No throw rugs] : No throw rugs [Fall prevention counseling provided] : Fall prevention counseling provided [Use recommended devices] : Use recommended devices [Use proper foot wear] : Use proper foot wear [None] : None [Good understanding] : Patient has a good understanding of lifestyle changes and steps needed to achieve self management goal [de-identified] : [employer] TCM [facility] teaching: Patient instructed to keep incision clean, dry, and intact as per post op instructions.  Patient will continue with anticoagulation regimen as directed.  Patient advised to elevate and ice extremity as per post op instructions. Patient advised to continue with incentive spirometer/cough and deep breathe 10x/hr. Patient advised to take pain medication as per post op instructions.  Medication education discussed in full detail with + teach back.  Patient advised to adhere to bowel regimen.  Safety precautions discussed including use of DMEs for ambulating and ADLs and advised to continue with exercises as directed.  Patient advised to follow up with the orthopedic surgeon as scheduled.  Contact information given, patient advised to call with any questions/concerns.\par \par \par \par

## 2020-10-16 NOTE — HISTORY OF PRESENT ILLNESS
[Other Location: e.g. Home (Enter Location, City,State)___] : at [unfilled] [Home] : at home, [unfilled] , at the time of the visit. [Verbal consent obtained from patient] : the patient, [unfilled] [Spouse] : spouse [FreeTextEntry1] : pt evaluated for pain post JABIER [de-identified] : This is an 85 year old male enrolled in [facility] with hx of left acetabular fx NO SX REPAIR PMH includes , HTN, HLD, on 81 mg ASA \par daily presented to the ED for evaluation right lateral hip pain s/p ground level fall just prior to arrival. Pt underwent JABIER and was hospitalized from 10/11/2 0-10/13/20. Pt was sent home with [facility] and physical therapy. At time of call, pt was in 10/10 pain. He had taken tylenol as ordered with no relief. Pt advised to take advil and use an icepack. Call placed to Dr Xochitl uDenas to advise of pain level and percocet was added to regimen. Pt also c/o no BM in 2 days, today is day 3. Non c/o abd pain, n/v or distention. [place] advised.

## 2020-10-16 NOTE — COUNSELING
[Fall prevention counseling provided] : Fall prevention counseling provided [Adequate lighting] : Adequate lighting [No throw rugs] : No throw rugs [Use recommended devices] : Use recommended devices [de-identified] : [employer] TCM [facility] teaching: Patient instructed to keep incision clean, dry, and intact as per post op instructions.  Patient will continue with anticoagulation regimen as directed.  Patient advised to elevate and ice extremity as per post op instructions. Patient advised to continue with incentive spirometer/cough and deep breathe 10x/hr. Patient advised to take pain medication as per post op instructions.  Medication education discussed in full detail with + teach back.  Patient advised to adhere to bowel regimen.  Safety precautions discussed including use of DMEs for ambulating and ADLs and advised to continue with exercises as directed.  Patient advised to follow up with the orthopedic surgeon as scheduled.  Contact information given, patient advised to call with any questions/concerns.\par \par \par \par

## 2020-10-16 NOTE — REVIEW OF SYSTEMS
[Negative] : Psychiatric [FreeTextEntry9] : Left hip and LE pain 10/10 [de-identified] : staples covered with dsd

## 2020-10-16 NOTE — PLAN
[FreeTextEntry1] : [facility]jr\par \par c/w aspirin 81 mg bid\par start percocet as ordered by Dr Xochitl Duenas\par \par c/w with physical therapy\par follow up with all medical appointments. \par \par htn\par asa 81 mg daily\par diltiazem 240 mg daily\par lisinopril 20 mg day\par propranolol 20 mg daily\par

## 2020-10-28 DIAGNOSIS — Z79.82 LONG TERM (CURRENT) USE OF ASPIRIN: ICD-10-CM
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
## 2020-10-28 DIAGNOSIS — E78.5 HYPERLIPIDEMIA, UNSPECIFIED: ICD-10-CM

## 2020-10-28 DIAGNOSIS — M80.051A AGE-RELATED OSTEOPOROSIS WITH CURRENT PATHOLOGICAL FRACTURE, RIGHT FEMUR, INITIAL ENCOUNTER FOR FRACTURE: ICD-10-CM

## 2020-10-28 DIAGNOSIS — I10 ESSENTIAL (PRIMARY) HYPERTENSION: ICD-10-CM

## 2021-01-26 ENCOUNTER — APPOINTMENT (RX ONLY)
Dept: URBAN - METROPOLITAN AREA CLINIC 97 | Facility: CLINIC | Age: 86
Setting detail: DERMATOLOGY
End: 2021-01-26

## 2021-01-26 DIAGNOSIS — D18.0 HEMANGIOMA: ICD-10-CM

## 2021-01-26 DIAGNOSIS — L85.3 XEROSIS CUTIS: ICD-10-CM

## 2021-01-26 DIAGNOSIS — R20.2 PARESTHESIA OF SKIN: ICD-10-CM

## 2021-01-26 DIAGNOSIS — L82.1 OTHER SEBORRHEIC KERATOSIS: ICD-10-CM

## 2021-01-26 DIAGNOSIS — L57.0 ACTINIC KERATOSIS: ICD-10-CM

## 2021-01-26 PROBLEM — D18.01 HEMANGIOMA OF SKIN AND SUBCUTANEOUS TISSUE: Status: ACTIVE | Noted: 2021-01-26

## 2021-01-26 PROBLEM — D48.5 NEOPLASM OF UNCERTAIN BEHAVIOR OF SKIN: Status: ACTIVE | Noted: 2021-01-26

## 2021-01-26 PROCEDURE — 11102 TANGNTL BX SKIN SINGLE LES: CPT

## 2021-01-26 PROCEDURE — ? COUNSELING

## 2021-01-26 PROCEDURE — ? TREATMENT REGIMEN

## 2021-01-26 PROCEDURE — 99213 OFFICE O/P EST LOW 20 MIN: CPT | Mod: 25

## 2021-01-26 PROCEDURE — ? PRESCRIPTION

## 2021-01-26 PROCEDURE — ? BIOPSY BY SHAVE METHOD

## 2021-01-26 PROCEDURE — ? LIQUID NITROGEN

## 2021-01-26 PROCEDURE — 17000 DESTRUCT PREMALG LESION: CPT | Mod: 59

## 2021-01-26 RX ORDER — BETAMETHASONE DIPROPIONATE 0.5 MG/G
CREAM TOPICAL
Qty: 1 | Refills: 3 | Status: ERX | COMMUNITY
Start: 2021-01-26

## 2021-01-26 RX ADMIN — BETAMETHASONE DIPROPIONATE SMALL AMOUNT: 0.5 CREAM TOPICAL at 00:00

## 2021-01-26 ASSESSMENT — LOCATION SIMPLE DESCRIPTION DERM
LOCATION SIMPLE: LEFT FOREARM
LOCATION SIMPLE: RIGHT PRETIBIAL REGION
LOCATION SIMPLE: UPPER BACK
LOCATION SIMPLE: LEFT UPPER BACK
LOCATION SIMPLE: LEFT PRETIBIAL REGION
LOCATION SIMPLE: CHEST
LOCATION SIMPLE: ABDOMEN

## 2021-01-26 ASSESSMENT — LOCATION DETAILED DESCRIPTION DERM
LOCATION DETAILED: LEFT INFERIOR UPPER BACK
LOCATION DETAILED: INFERIOR THORACIC SPINE
LOCATION DETAILED: PERIUMBILICAL SKIN
LOCATION DETAILED: LEFT PROXIMAL RADIAL DORSAL FOREARM
LOCATION DETAILED: RIGHT PROXIMAL PRETIBIAL REGION
LOCATION DETAILED: LEFT PROXIMAL PRETIBIAL REGION
LOCATION DETAILED: LEFT MEDIAL INFERIOR CHEST

## 2021-01-26 ASSESSMENT — LOCATION ZONE DERM
LOCATION ZONE: TRUNK
LOCATION ZONE: LEG
LOCATION ZONE: ARM
LOCATION ZONE: TRUNK

## 2021-01-26 NOTE — PROCEDURE: TREATMENT REGIMEN
Initiate Treatment: betamethasone dipropionate 0.05 % topical cream -Apply to affected areas on back bid x2 weeks on, 2 weeks off.
Detail Level: Zone

## 2021-01-26 NOTE — PROCEDURE: LIQUID NITROGEN
Post-Care Instructions: I reviewed with the patient in detail post-care instructions. Patient is to wear sunprotection, and avoid picking at any of the treated lesions. Pt may apply Vaseline to crusted or scabbing areas.
Render Note In Bullet Format When Appropriate: No
Detail Level: Detailed
Number Of Freeze-Thaw Cycles: 1 freeze-thaw cycle
Duration Of Freeze Thaw-Cycle (Seconds): 3
Render Post-Care Instructions In Note?: yes
Consent: The patient's consent was obtained including but not limited to risks of crusting, scabbing, blistering, scarring, darker or lighter pigmentary change, recurrence, incomplete removal and infection.

## 2021-01-26 NOTE — PROCEDURE: BIOPSY BY SHAVE METHOD
Detail Level: Simple
Depth Of Biopsy: dermis
Was A Bandage Applied: Yes
Size Of Lesion In Cm: 0.4
X Size Of Lesion In Cm: 0
Biopsy Type: H and E
Biopsy Method: 15 blade
Anesthesia Type: 2% lidocaine with epinephrine
Anesthesia Volume In Cc (Will Not Render If 0): 0.5
Hemostasis: Zee's
Wound Care: No ointment
Dressing: bandage
Destruction After The Procedure: No
Type Of Destruction Used: Curettage
Electrodesiccation Text: The wound bed was treated with electrodesiccation after the biopsy was performed.
Electrodesiccation And Curettage Text: The wound bed was treated with electrodesiccation and curettage after the biopsy was performed.
Silver Nitrate Text: The wound bed was treated with silver nitrate after the biopsy was performed.
Lab: [facility]
Lab Facility: [id number] [facility] [facility]
Consent: The provider's intent is to obtain a tissue sample solely for diagnostic purposes. Written consent was obtained and risks were reviewed including but not limited to scarring, infection, bleeding, scabbing, incomplete removal, nerve damage and allergy to anesthesia.
Post-Care Instructions: I reviewed with the patient in detail post-care instructions. Patient is to keep the biopsy site dry overnight, and then apply bacitracin twice daily until healed. Patient may apply hydrogen peroxide soaks to remove any crusting.
Notification Instructions: Patient will be notified of biopsy results. However, patient instructed to call the office if not contacted within 2 weeks.
Billing Type: [employer]
Information: Selecting Yes will display possible errors in your note based on the variables you have selected. This validation is only offered as a suggestion for you. PLEASE NOTE THAT THE VALIDATION TEXT WILL BE REMOVED WHEN YOU FINALIZE YOUR NOTE. IF YOU WANT TO FAX A PRELIMINARY NOTE YOU WILL NEED TO TOGGLE THIS TO 'NO' IF YOU DO NOT WANT IT IN YOUR FAXED NOTE.

## 2021-02-03 NOTE — DISCHARGE NOTE PROVIDER - CARE PROVIDER_API CALL
Sean Huber)  Internal Medicine  [address] [place] [address]  Phone: [phone]  Fax: [phone]  Follow Up Time:     Cayden Sauceda MD,   call  to make appointment  Phone: (   )    -  Fax: (   )    -  Follow Up Time:
Home

## 2021-03-17 RX ORDER — ALBUTEROL SULFATE 90 UG/1
108 (90 BASE) AEROSOL, METERED RESPIRATORY (INHALATION)
Refills: 0 | Status: ACTIVE | COMMUNITY

## 2021-04-07 ENCOUNTER — APPOINTMENT (OUTPATIENT)
Dept: PULMONOLOGY | Facility: CLINIC | Age: 86
End: 2021-04-07
Payer: MEDICARE

## 2021-04-07 VITALS
HEIGHT: 66 IN | WEIGHT: 142 LBS | RESPIRATION RATE: 12 BRPM | BODY MASS INDEX: 22.82 KG/M2 | SYSTOLIC BLOOD PRESSURE: 120 MMHG | DIASTOLIC BLOOD PRESSURE: 70 MMHG | OXYGEN SATURATION: 99 % | HEART RATE: 55 BPM

## 2021-04-07 PROCEDURE — 99213 OFFICE O/P EST LOW 20 MIN: CPT

## 2021-04-15 ENCOUNTER — OUTPATIENT (OUTPATIENT)
Dept: OUTPATIENT SERVICES | Facility: HOSPITAL | Age: 86
LOS: 1 days | Discharge: HOME | End: 2021-04-15

## 2021-04-15 DIAGNOSIS — Z98.890 OTHER SPECIFIED POSTPROCEDURAL STATES: Chronic | ICD-10-CM

## 2021-04-19 ENCOUNTER — APPOINTMENT (OUTPATIENT)
Dept: UROLOGY | Facility: CLINIC | Age: 86
End: 2021-04-19
Payer: MEDICARE

## 2021-04-19 VITALS — BODY MASS INDEX: 25.16 KG/M2 | WEIGHT: 142 LBS | HEIGHT: 63 IN | TEMPERATURE: 98 F

## 2021-04-19 PROCEDURE — 51798 US URINE CAPACITY MEASURE: CPT

## 2021-04-19 PROCEDURE — 99214 OFFICE O/P EST MOD 30 MIN: CPT

## 2021-04-19 NOTE — HISTORY OF PRESENT ILLNESS
[FreeTextEntry1] : 87 yo seen 9/20202 for retention\par complains of lower abdominal pain and groin\par \par the patient has a low PVR today (56 cc)\par \par renal and bladder US 8/25/2020\par prostate 74 cc\par post void 27 cc\par \par UA and Cx pending\par

## 2021-04-19 NOTE — ASSESSMENT
[FreeTextEntry1] : 85 yo with bph AND luts\par PATIENT IS EMPTYING PROPERLY\par STILL WITH FREQUENCY - related to neurologic complaints\par \par - he can f/u as needed\par - no indication for urologic intervention

## 2021-04-19 NOTE — REVIEW OF SYSTEMS
[Arthralgias] : arthralgias [Joint Pain] : joint pain [Limb Weakness] : limb weakness [Erectile Dysfunction] : erectile dysfunction [Fever] : no fever [Chills] : no chills [Feeling Poorly] : not feeling poorly [Eye Pain] : no eye pain [Red Eyes] : eyes not red [Earache] : no earache [Loss Of Hearing] : no hearing loss [Nosebleeds] : no nosebleeds [Heart Rate Is Slow] : the heart rate was not slow [Heart Rate Is Fast] : the heart rate was not fast [Shortness Of Breath] : no shortness of breath [Abdominal Pain] : no abdominal pain [Vomiting] : no vomiting [Constipation] : no constipation [Diarrhea] : no diarrhea [Dysuria] : no dysuria [Incontinence] : no incontinence [Hesitancy] : no urinary hesitancy [Skin Lesions] : no skin lesions [Confused] : no confusion [Suicidal] : not suicidal [Sleep Disturbances] : no sleep disturbances [Anxiety] : no anxiety [Depression] : no depression [Proptosis] : no proptosis [Hot Flashes] : no hot flashes [Muscle Weakness] : no muscle weakness [Easy Bleeding] : no tendency for easy bleeding [Easy Bruising] : no tendency for easy bruising

## 2021-04-21 LAB
APPEARANCE: CLEAR
BILIRUBIN URINE: NEGATIVE
BLOOD URINE: NEGATIVE
COLOR: YELLOW
GLUCOSE QUALITATIVE U: NEGATIVE
KETONES URINE: NEGATIVE
LEUKOCYTE ESTERASE URINE: NEGATIVE
NITRITE URINE: NEGATIVE
PH URINE: 5.5
PROTEIN URINE: NEGATIVE
SPECIFIC GRAVITY URINE: 1.02
UROBILINOGEN URINE: NORMAL

## 2021-04-25 DIAGNOSIS — N39.0 URINARY TRACT INFECTION, SITE NOT SPECIFIED: ICD-10-CM

## 2021-04-25 LAB — BACTERIA UR CULT: ABNORMAL

## 2021-05-21 NOTE — PATIENT PROFILE ADULT - STATED REASON FOR ADMISSION
Render Risk Assessment In Note?: no Detail Level: Simple Additional Notes: Discussed possibly looking into at home phototherapy treatments. Additional Notes: Patient affected by instability when walking, slipping and sliding in shoes. Patient has been dealing with this for years. Patient tried certain dry in high school. fall

## 2021-07-03 ENCOUNTER — EMERGENCY (EMERGENCY)
Facility: HOSPITAL | Age: 86
LOS: 0 days | Discharge: HOME | End: 2021-07-03
Admitting: STUDENT IN AN ORGANIZED HEALTH CARE EDUCATION/TRAINING PROGRAM

## 2021-07-03 ENCOUNTER — INPATIENT (INPATIENT)
Facility: HOSPITAL | Age: 86
LOS: 0 days | Discharge: HOME | End: 2021-07-04
Attending: STUDENT IN AN ORGANIZED HEALTH CARE EDUCATION/TRAINING PROGRAM | Admitting: STUDENT IN AN ORGANIZED HEALTH CARE EDUCATION/TRAINING PROGRAM
Payer: MEDICARE

## 2021-07-03 VITALS
DIASTOLIC BLOOD PRESSURE: 98 MMHG | OXYGEN SATURATION: 99 % | WEIGHT: 141.98 LBS | HEART RATE: 66 BPM | TEMPERATURE: 97 F | HEIGHT: 65 IN | SYSTOLIC BLOOD PRESSURE: 203 MMHG | RESPIRATION RATE: 18 BRPM

## 2021-07-03 DIAGNOSIS — K21.9 GASTRO-ESOPHAGEAL REFLUX DISEASE WITHOUT ESOPHAGITIS: ICD-10-CM

## 2021-07-03 DIAGNOSIS — Z98.890 OTHER SPECIFIED POSTPROCEDURAL STATES: Chronic | ICD-10-CM

## 2021-07-03 DIAGNOSIS — R11.0 NAUSEA: ICD-10-CM

## 2021-07-03 DIAGNOSIS — Z79.82 LONG TERM (CURRENT) USE OF ASPIRIN: ICD-10-CM

## 2021-07-03 DIAGNOSIS — Z20.822 CONTACT WITH AND (SUSPECTED) EXPOSURE TO COVID-19: ICD-10-CM

## 2021-07-03 DIAGNOSIS — I10 ESSENTIAL (PRIMARY) HYPERTENSION: ICD-10-CM

## 2021-07-03 DIAGNOSIS — R07.89 OTHER CHEST PAIN: ICD-10-CM

## 2021-07-03 DIAGNOSIS — E78.5 HYPERLIPIDEMIA, UNSPECIFIED: ICD-10-CM

## 2021-07-03 DIAGNOSIS — Z79.899 OTHER LONG TERM (CURRENT) DRUG THERAPY: ICD-10-CM

## 2021-07-03 DIAGNOSIS — N40.0 BENIGN PROSTATIC HYPERPLASIA WITHOUT LOWER URINARY TRACT SYMPTOMS: ICD-10-CM

## 2021-07-03 DIAGNOSIS — R42 DIZZINESS AND GIDDINESS: ICD-10-CM

## 2021-07-03 DIAGNOSIS — F10.10 ALCOHOL ABUSE, UNCOMPLICATED: ICD-10-CM

## 2021-07-03 DIAGNOSIS — R10.13 EPIGASTRIC PAIN: ICD-10-CM

## 2021-07-03 DIAGNOSIS — Z87.438 PERSONAL HISTORY OF OTHER DISEASES OF MALE GENITAL ORGANS: ICD-10-CM

## 2021-07-03 DIAGNOSIS — F17.210 NICOTINE DEPENDENCE, CIGARETTES, UNCOMPLICATED: ICD-10-CM

## 2021-07-03 DIAGNOSIS — Z79.02 LONG TERM (CURRENT) USE OF ANTITHROMBOTICS/ANTIPLATELETS: ICD-10-CM

## 2021-07-03 DIAGNOSIS — R07.9 CHEST PAIN, UNSPECIFIED: ICD-10-CM

## 2021-07-03 DIAGNOSIS — E78.00 PURE HYPERCHOLESTEROLEMIA, UNSPECIFIED: ICD-10-CM

## 2021-07-03 LAB
ALBUMIN SERPL ELPH-MCNC: 4.7 G/DL — SIGNIFICANT CHANGE UP (ref 3.5–5.2)
ALP SERPL-CCNC: 108 U/L — SIGNIFICANT CHANGE UP (ref 30–115)
ALT FLD-CCNC: 12 U/L — SIGNIFICANT CHANGE UP (ref 0–41)
ANION GAP SERPL CALC-SCNC: 14 MMOL/L — SIGNIFICANT CHANGE UP (ref 7–14)
AST SERPL-CCNC: 23 U/L — SIGNIFICANT CHANGE UP (ref 0–41)
BASOPHILS # BLD AUTO: 0.11 K/UL — SIGNIFICANT CHANGE UP (ref 0–0.2)
BASOPHILS NFR BLD AUTO: 1 % — SIGNIFICANT CHANGE UP (ref 0–1)
BILIRUB DIRECT SERPL-MCNC: <0.2 MG/DL — SIGNIFICANT CHANGE UP (ref 0–0.2)
BILIRUB INDIRECT FLD-MCNC: >0.5 MG/DL — SIGNIFICANT CHANGE UP (ref 0.2–1.2)
BILIRUB SERPL-MCNC: 0.7 MG/DL — SIGNIFICANT CHANGE UP (ref 0.2–1.2)
BUN SERPL-MCNC: 15 MG/DL — SIGNIFICANT CHANGE UP (ref 10–20)
CALCIUM SERPL-MCNC: 9.5 MG/DL — SIGNIFICANT CHANGE UP (ref 8.5–10.1)
CHLORIDE SERPL-SCNC: 99 MMOL/L — SIGNIFICANT CHANGE UP (ref 98–110)
CO2 SERPL-SCNC: 25 MMOL/L — SIGNIFICANT CHANGE UP (ref 17–32)
CREAT SERPL-MCNC: 0.9 MG/DL — SIGNIFICANT CHANGE UP (ref 0.7–1.5)
EOSINOPHIL # BLD AUTO: 0.48 K/UL — SIGNIFICANT CHANGE UP (ref 0–0.7)
EOSINOPHIL NFR BLD AUTO: 4.4 % — SIGNIFICANT CHANGE UP (ref 0–8)
GLUCOSE SERPL-MCNC: 123 MG/DL — HIGH (ref 70–99)
HCT VFR BLD CALC: 47.9 % — SIGNIFICANT CHANGE UP (ref 42–52)
HGB BLD-MCNC: 16.3 G/DL — SIGNIFICANT CHANGE UP (ref 14–18)
IMM GRANULOCYTES NFR BLD AUTO: 0.3 % — SIGNIFICANT CHANGE UP (ref 0.1–0.3)
LIDOCAIN IGE QN: 92 U/L — HIGH (ref 7–60)
LYMPHOCYTES # BLD AUTO: 1.62 K/UL — SIGNIFICANT CHANGE UP (ref 1.2–3.4)
LYMPHOCYTES # BLD AUTO: 15 % — LOW (ref 20.5–51.1)
MCHC RBC-ENTMCNC: 32.1 PG — HIGH (ref 27–31)
MCHC RBC-ENTMCNC: 34 G/DL — SIGNIFICANT CHANGE UP (ref 32–37)
MCV RBC AUTO: 94.3 FL — HIGH (ref 80–94)
MONOCYTES # BLD AUTO: 1.02 K/UL — HIGH (ref 0.1–0.6)
MONOCYTES NFR BLD AUTO: 9.4 % — HIGH (ref 1.7–9.3)
NEUTROPHILS # BLD AUTO: 7.56 K/UL — HIGH (ref 1.4–6.5)
NEUTROPHILS NFR BLD AUTO: 69.9 % — SIGNIFICANT CHANGE UP (ref 42.2–75.2)
NRBC # BLD: 0 /100 WBCS — SIGNIFICANT CHANGE UP (ref 0–0)
PLATELET # BLD AUTO: 219 K/UL — SIGNIFICANT CHANGE UP (ref 130–400)
POTASSIUM SERPL-MCNC: 4.6 MMOL/L — SIGNIFICANT CHANGE UP (ref 3.5–5)
POTASSIUM SERPL-SCNC: 4.6 MMOL/L — SIGNIFICANT CHANGE UP (ref 3.5–5)
PROT SERPL-MCNC: 7.1 G/DL — SIGNIFICANT CHANGE UP (ref 6–8)
RAPID RVP RESULT: SIGNIFICANT CHANGE UP
RBC # BLD: 5.08 M/UL — SIGNIFICANT CHANGE UP (ref 4.7–6.1)
RBC # FLD: 13 % — SIGNIFICANT CHANGE UP (ref 11.5–14.5)
SARS-COV-2 RNA SPEC QL NAA+PROBE: SIGNIFICANT CHANGE UP
SODIUM SERPL-SCNC: 138 MMOL/L — SIGNIFICANT CHANGE UP (ref 135–146)
TROPONIN T SERPL-MCNC: <0.01 NG/ML — SIGNIFICANT CHANGE UP
WBC # BLD: 10.82 K/UL — HIGH (ref 4.8–10.8)
WBC # FLD AUTO: 10.82 K/UL — HIGH (ref 4.8–10.8)

## 2021-07-03 PROCEDURE — 99285 EMERGENCY DEPT VISIT HI MDM: CPT | Mod: CS

## 2021-07-03 PROCEDURE — 99222 1ST HOSP IP/OBS MODERATE 55: CPT | Mod: 25

## 2021-07-03 PROCEDURE — 71045 X-RAY EXAM CHEST 1 VIEW: CPT | Mod: 26

## 2021-07-03 PROCEDURE — 93010 ELECTROCARDIOGRAM REPORT: CPT | Mod: NC

## 2021-07-03 PROCEDURE — 99406 BEHAV CHNG SMOKING 3-10 MIN: CPT

## 2021-07-03 PROCEDURE — 99497 ADVNCD CARE PLAN 30 MIN: CPT | Mod: 25

## 2021-07-03 RX ORDER — ATORVASTATIN CALCIUM 80 MG/1
80 TABLET, FILM COATED ORAL AT BEDTIME
Refills: 0 | Status: DISCONTINUED | OUTPATIENT
Start: 2021-07-03 | End: 2021-07-04

## 2021-07-03 RX ORDER — FAMOTIDINE 10 MG/ML
20 INJECTION INTRAVENOUS ONCE
Refills: 0 | Status: COMPLETED | OUTPATIENT
Start: 2021-07-03 | End: 2021-07-03

## 2021-07-03 RX ORDER — TAMSULOSIN HYDROCHLORIDE 0.4 MG/1
0.4 CAPSULE ORAL AT BEDTIME
Refills: 0 | Status: DISCONTINUED | OUTPATIENT
Start: 2021-07-03 | End: 2021-07-04

## 2021-07-03 RX ORDER — ONDANSETRON 8 MG/1
4 TABLET, FILM COATED ORAL ONCE
Refills: 0 | Status: COMPLETED | OUTPATIENT
Start: 2021-07-03 | End: 2021-07-03

## 2021-07-03 RX ORDER — ENOXAPARIN SODIUM 100 MG/ML
40 INJECTION SUBCUTANEOUS DAILY
Refills: 0 | Status: DISCONTINUED | OUTPATIENT
Start: 2021-07-03 | End: 2021-07-04

## 2021-07-03 RX ORDER — DONEPEZIL HYDROCHLORIDE 10 MG/1
5 TABLET, FILM COATED ORAL DAILY
Refills: 0 | Status: DISCONTINUED | OUTPATIENT
Start: 2021-07-03 | End: 2021-07-04

## 2021-07-03 RX ORDER — ASPIRIN/CALCIUM CARB/MAGNESIUM 324 MG
325 TABLET ORAL ONCE
Refills: 0 | Status: COMPLETED | OUTPATIENT
Start: 2021-07-03 | End: 2021-07-03

## 2021-07-03 RX ORDER — SODIUM CHLORIDE 9 MG/ML
1000 INJECTION, SOLUTION INTRAVENOUS ONCE
Refills: 0 | Status: COMPLETED | OUTPATIENT
Start: 2021-07-03 | End: 2021-07-03

## 2021-07-03 RX ORDER — LISINOPRIL 2.5 MG/1
20 TABLET ORAL DAILY
Refills: 0 | Status: DISCONTINUED | OUTPATIENT
Start: 2021-07-03 | End: 2021-07-04

## 2021-07-03 RX ORDER — DILTIAZEM HCL 120 MG
240 CAPSULE, EXT RELEASE 24 HR ORAL DAILY
Refills: 0 | Status: DISCONTINUED | OUTPATIENT
Start: 2021-07-03 | End: 2021-07-04

## 2021-07-03 RX ORDER — ISOSORBIDE MONONITRATE 60 MG/1
30 TABLET, EXTENDED RELEASE ORAL DAILY
Refills: 0 | Status: DISCONTINUED | OUTPATIENT
Start: 2021-07-03 | End: 2021-07-04

## 2021-07-03 RX ORDER — BUPROPION HYDROCHLORIDE 150 MG/1
0 TABLET, EXTENDED RELEASE ORAL
Qty: 0 | Refills: 0 | DISCHARGE

## 2021-07-03 RX ORDER — FAMOTIDINE 10 MG/ML
20 INJECTION INTRAVENOUS
Refills: 0 | Status: DISCONTINUED | OUTPATIENT
Start: 2021-07-03 | End: 2021-07-04

## 2021-07-03 RX ORDER — CHLORHEXIDINE GLUCONATE 213 G/1000ML
1 SOLUTION TOPICAL
Refills: 0 | Status: DISCONTINUED | OUTPATIENT
Start: 2021-07-03 | End: 2021-07-04

## 2021-07-03 RX ORDER — ASPIRIN/CALCIUM CARB/MAGNESIUM 324 MG
81 TABLET ORAL
Refills: 0 | Status: DISCONTINUED | OUTPATIENT
Start: 2021-07-03 | End: 2021-07-04

## 2021-07-03 RX ADMIN — Medication 325 MILLIGRAM(S): at 17:52

## 2021-07-03 RX ADMIN — FAMOTIDINE 20 MILLIGRAM(S): 10 INJECTION INTRAVENOUS at 17:51

## 2021-07-03 RX ADMIN — Medication 30 MILLILITER(S): at 18:54

## 2021-07-03 RX ADMIN — ONDANSETRON 4 MILLIGRAM(S): 8 TABLET, FILM COATED ORAL at 18:18

## 2021-07-03 NOTE — ED PROVIDER NOTE - ATTENDING CONTRIBUTION TO CARE
I was present for and supervised the key and critical aspects of the procedures performed during the care of the patient.  I personally evaluated the patient. I reviewed the Resident’s or Physician Assistant’s note (as assigned above), and agree with the findings and plan except as documented in my note.  87 y/o M with PMHx GERD, HTN, HLD presents with chest “burning” that he says feels similar to reflux, nausea and dizziness since this morning. Pt states he took “2 pills” at home prior to onset of sxs, but is unsure if it was his prescribed tramadol. No fever, chills, HA, vision changes, SOB, abdominal pain, vomiting, back pain, calf pain, numbness, weakness or tingling. On exam: NCAT. PERRLA, EOMI. OP clear. Lungs CTAB. RRR, S1S2 noted. Radial pulses 2+ and equal, pedal pulses 2+ and equal. Abdomen soft, NT/ND, no rebound or guarding. FROM x4 extremities. No focal neuro deficits. A/P: EKG, troponin and labs. Given risk factors and presentation, will seek to admit.

## 2021-07-03 NOTE — H&P ADULT - ASSESSMENT
86 yr old male with hx of CAD, HTN, BPH admitted for chest pain     # Atypical chest pain   - likely GI related  - r/o ACS  - trop <0.01 --> f/u AM trop   - EKG: NSR  - start protoxin   - outpt cardiology f/u    # CAD  - c/w ASA, propranolol, isosorbide and statin     # Elevated Lipase  - no abd pain     # HTN  - c/w Diltiazem lisinopril     # BPH  - c/w Flomax     # Nicotine abuse   - counselled to quit smoking (3 min)  - refused nicotine patch     # DVT ppx  - start Lovenox     # DASH diet    # Ambulate as tolerated    # Full code 86 yr old male with hx of CAD, HTN, BPH admitted for chest pain    med rec reviewed  with Girlfriend  at [phone] ( is )    # Atypical chest pain   - likely GI related  - r/o ACS  - trop <0.01 --> f/u AM trop   - EKG: NSR  - start protoxin   - outpt cardiology f/u    # CAD  - c/w ASA, propranolol, isosorbide and statin     # Elevated Lipase  - no abd pain     # HTN  - c/w Diltiazem lisinopril     # BPH  - c/w Flomax     # Nicotine abuse   - counselled to quit smoking (3 min)  - refused nicotine patch     # DVT ppx  - start Lovenox     # DASH diet    # Ambulate as tolerated    # Full code 86 yr old male with hx of CAD, HTN, BPH admitted for Epigastric discomfort for 2 days    # Dyspepsia  - likely GI related  - r/o ACS  - asymptomatic at encounter   - trop <0.01 --> f/u AM trop   - EKG: NSR  - start protoxin   - outpt GI f/u    # CAD  - c/w ASA, propranolol, isosorbide and statin     # Elevated Lipase  - no abd pain     # HTN  - c/w Diltiazem lisinopril     # BPH  - c/w Flomax     # Nicotine abuse   - counselled to quit smoking (3 min)  - refused nicotine patch     # DVT ppx  - start Lovenox     # DASH diet    # Ambulate as tolerated    # Ancipitate for discharge tomorrow     # Full code

## 2021-07-03 NOTE — ED ADULT NURSE NOTE - PMH
Dyslipidemia    Enlarged prostate    Gastroesophageal reflux disease    High cholesterol    Hypertension, unspecified type

## 2021-07-03 NOTE — H&P ADULT - NSHPSOCIALHISTORY_GEN_ALL_CORE
Marital Status:  (   )    (   ) Single    (   )    (  )   Lives with: (  ) alone  (  ) children   (  ) spouse   (  ) parents  (  ) other  Recent Travel: No recent travel  Occupation:    Substance Use (street drugs): ( x ) never used  (  ) other:  Tobacco Usage:  ( x  ) never smoked   (   ) former smoker   (   ) current smoker  (     ) pack year  Alcohol Usage: None Marital Status:  (   )    (   ) Single    (   )    (  x)   Lives with: (  ) alone  (  ) children   (  ) spouse   (  ) parents  (  ) other  Recent Travel: No recent travel  Occupation:    Substance Use (street drugs): ( x ) never used  (  ) other:  Tobacco Usage:  (   ) never smoked   (   ) former smoker   ( x  ) current smoker  (     ) pack year  Alcohol Usage: None  pt smokes 5 to 6 cigarettes  daily x 50 years Marital Status:  (   )    (   ) Single    (   )    (  x)   Substance Use (street drugs): ( x ) never used  (  ) other:  Tobacco Usage:  (   ) never smoked   (   ) former smoker   ( x  ) current smoker  (     ) pack year: smokes 5 to 6 cigarettes  daily x 50 years  Alcohol Usage: None

## 2021-07-03 NOTE — H&P ADULT - HISTORY OF PRESENT ILLNESS
Applied 86 yr old male with hx of CAD, HTN, BPH admitted for Epigastric discomfort for 2 days. Patient reports developing epigastric discomfort after dinner last night associated with  3 episodes of diarrhoea yesterday, that resolved and no BM today. The epigastric discomfort persisted all day today so he decided to come to ER. He denies any fever, chills, chest pain, sob, palpitation no N/V, no urinary issues.

## 2021-07-03 NOTE — ED PROVIDER NOTE - CLINICAL SUMMARY MEDICAL DECISION MAKING FREE TEXT BOX
patient presents for evaluation of chest pain with lightheadedness considering his risk factors we obtained ekg troponin which are negative I will admit at this time.  patient is in agreement with plan

## 2021-07-03 NOTE — H&P ADULT - CONVERSATION DETAILS
Spoke with pt regarding living will and what his wishes would be if his heart were to stop beating or his respiratory status worsened. pt states at this point he would want all life sustaining methods to be carried out and therefore remain FULL CODE

## 2021-07-03 NOTE — ED PROVIDER NOTE - PHYSICAL EXAMINATION
CONSTITUTIONAL: Well-developed; well-nourished; in no acute distress, nontoxic appearing  SKIN: skin exam is warm and dry  HEAD: Normocephalic; atraumatic  EYES:  conjunctiva and sclera clear  ENT: MMM  NECK: ROM intact.  CARD: S1, S2 normal, no murmur  RESP: No wheezes, rales or rhonchi. Good air movement bilaterally  ABD: soft; non-distended; non-tender. No rebound/guarding. No CVAT   EXT: Normal ROM.   NEURO: awake, alert, following commands, oriented, grossly unremarkable. No Focal deficits. GCS 15.   PSYCH: Cooperative, appropriate.

## 2021-07-03 NOTE — ED PROVIDER NOTE - NS ED ROS FT
Review of Systems:  	•	CONSTITUTIONAL: no fever, no chills  	•	SKIN: no rash  	•	ENT: no sore throat  	•	RESPIRATORY: no shortness of breath, no cough  	•	CARDIAC: no chest pain, no palpitations  	•	GI: +nausea, no vomiting. no abd pain, no diarrhea  	•	GENITO-URINARY: no discharge, no dysuria; no hematuria, no increased urinary frequency  	•	MUSCULOSKELETAL: no joint paint, no swelling, no redness  	•	NEUROLOGIC: no headache, no weakness, no syncope   	•	PSYCH: no anxiety, non suicidal, non homicidal, no hallucination, no depression

## 2021-07-03 NOTE — ED PROVIDER NOTE - OBJECTIVE STATEMENT
86 year old male, past medical history htn, hdl, bph, who presents with chest pain. patient endorses burning sensation to chest with associated lightheadedness/nausea that began this morning. patient reports pain constant, right-sided, non-radiating. denies hx similar. no hx abdominal surgeries. patient active smoker. follows with dr valencia, cardiologist, last stress 1 year ago, unremarkable. patient endorses daily drinking 1-2 drinks/day.

## 2021-07-03 NOTE — H&P ADULT - NSICDXPASTMEDICALHX_GEN_ALL_CORE_FT
PAST MEDICAL HISTORY:  Dyslipidemia     Enlarged prostate     Gastroesophageal reflux disease     High cholesterol     Hypertension, unspecified type

## 2021-07-03 NOTE — ED PROVIDER NOTE - PROGRESS NOTE DETAILS
I personally evaluated the patient. I reviewed the Resident’s or Physician Assistant’s note (as assigned above), and agree with the findings and plan except as documented in my note.  85 y/o M with PMHx GERD, HTN, HLD presents with chest “burning” that he says feels similar to reflux, nausea and dizziness since this morning. Pt states he took “2 pills” at home prior to onset of sxs, but is unsure if it was his prescribed tramadol. No fever, chills, HA, vision changes, SOB, abdominal pain, vomiting, back pain, calf pain, numbness, weakness or tingling. On exam: NCAT. PERRLA, EOMI. OP clear. Lungs CTAB. RRR, S1S2 noted. Radial pulses 2+ and equal, pedal pulses 2+ and equal. Abdomen soft, NT/ND, no rebound or guarding. FROM x4 extremities. No focal neuro deficits. A/P: EKG, troponin and labs. Given risk factors and presentation, will seek to admit.

## 2021-07-03 NOTE — H&P ADULT - NSHPLABSRESULTS_GEN_ALL_CORE
16.3   10.82 )-----------( 219      ( 03 Jul 2021 17:36 )             47.9       07-03    138  |  99  |  15  ----------------------------<  123<H>  4.6   |  25  |  0.9    Ca    9.5      03 Jul 2021 17:36    TPro  7.1  /  Alb  4.7  /  TBili  0.7  /  DBili  <0.2  /  AST  23  /  ALT  12  /  AlkPhos  108  07-03        CXR: no evidence of cardiopul disease (read by me)

## 2021-07-03 NOTE — H&P ADULT - NSHPPHYSICALEXAM_GEN_ALL_CORE
T(C): 36.3 (07-03-21 @ 17:14), Max: 36.3 (07-03-21 @ 17:14)  HR: 64 (07-03-21 @ 19:37) (63 - 66)  BP: 149/74 (07-03-21 @ 19:37) (149/74 - 203/98)  RR: 18 (07-03-21 @ 19:37) (18 - 148)  SpO2: 97% (07-03-21 @ 19:37) (97% - 99%)        GENERAL: NAD, well-developed  HEAD:  Atraumatic, Normocephalic  EYES: EOMI, PERRLA, conjunctiva and sclera clear  ENT: Normal tympanic membrane. No nasal obstruction or discharge. No tonsillar exudate, swelling or erythema.  NECK: Supple, No JVD  CHEST/LUNG: Clear to auscultation bilaterally; No wheeze  HEART: Regular rate and rhythm; No murmurs, rubs, or gallops  ABDOMEN: Soft, Nontender, Nondistended; Bowel sounds present  EXTREMITIES:  2+ Peripheral Pulses, No clubbing, cyanosis, or edema  PSYCH: AAOx3  NEUROLOGY: non-focal  SKIN: No rashes or lesions T(C): 36.3 (07-03-21 @ 17:14), Max: 36.3 (07-03-21 @ 17:14)  HR: 64 (07-03-21 @ 19:37) (63 - 66)  BP: 149/74 (07-03-21 @ 19:37) (149/74 - 203/98)  RR: 18 (07-03-21 @ 19:37) (18 - 148)  SpO2: 97% (07-03-21 @ 19:37) (97% - 99%)    GENERAL: NAD, well-developed  HEAD:  Atraumatic, Normocephalic  EYES: EOMI, PERRLA, conjunctiva and sclera clear  ENT: Normal tympanic membrane. No nasal obstruction or discharge. No tonsillar exudate, swelling or erythema.  NECK: Supple, No JVD  CHEST/LUNG: Clear to auscultation bilaterally; No wheeze  HEART: Regular rate and rhythm; No murmurs, rubs, or gallops  ABDOMEN: Soft, Nontender, Nondistended; Bowel sounds present  EXTREMITIES:  2+ Peripheral Pulses, No clubbing, cyanosis, or edema  PSYCH: AAOx3  NEUROLOGY: non-focal  SKIN: No rashes or lesions

## 2021-07-04 ENCOUNTER — TRANSCRIPTION ENCOUNTER (OUTPATIENT)
Age: 86
End: 2021-07-04

## 2021-07-04 VITALS
RESPIRATION RATE: 18 BRPM | HEART RATE: 61 BPM | SYSTOLIC BLOOD PRESSURE: 136 MMHG | TEMPERATURE: 97 F | DIASTOLIC BLOOD PRESSURE: 60 MMHG

## 2021-07-04 LAB
ANION GAP SERPL CALC-SCNC: 8 MMOL/L — SIGNIFICANT CHANGE UP (ref 7–14)
BASOPHILS # BLD AUTO: 0.08 K/UL — SIGNIFICANT CHANGE UP (ref 0–0.2)
BASOPHILS NFR BLD AUTO: 0.9 % — SIGNIFICANT CHANGE UP (ref 0–1)
BUN SERPL-MCNC: 11 MG/DL — SIGNIFICANT CHANGE UP (ref 10–20)
CALCIUM SERPL-MCNC: 8.6 MG/DL — SIGNIFICANT CHANGE UP (ref 8.5–10.1)
CHLORIDE SERPL-SCNC: 103 MMOL/L — SIGNIFICANT CHANGE UP (ref 98–110)
CK MB CFR SERPL CALC: 2.2 NG/ML — SIGNIFICANT CHANGE UP (ref 0.6–6.3)
CK SERPL-CCNC: 53 U/L — SIGNIFICANT CHANGE UP (ref 0–225)
CO2 SERPL-SCNC: 26 MMOL/L — SIGNIFICANT CHANGE UP (ref 17–32)
COVID-19 SPIKE DOMAIN AB INTERP: POSITIVE
COVID-19 SPIKE DOMAIN ANTIBODY RESULT: 19.7 U/ML — HIGH
CREAT SERPL-MCNC: 0.7 MG/DL — SIGNIFICANT CHANGE UP (ref 0.7–1.5)
EOSINOPHIL # BLD AUTO: 0.4 K/UL — SIGNIFICANT CHANGE UP (ref 0–0.7)
EOSINOPHIL NFR BLD AUTO: 4.5 % — SIGNIFICANT CHANGE UP (ref 0–8)
GLUCOSE SERPL-MCNC: 95 MG/DL — SIGNIFICANT CHANGE UP (ref 70–99)
HCT VFR BLD CALC: 43 % — SIGNIFICANT CHANGE UP (ref 42–52)
HGB BLD-MCNC: 14.3 G/DL — SIGNIFICANT CHANGE UP (ref 14–18)
IMM GRANULOCYTES NFR BLD AUTO: 0.2 % — SIGNIFICANT CHANGE UP (ref 0.1–0.3)
LYMPHOCYTES # BLD AUTO: 1.39 K/UL — SIGNIFICANT CHANGE UP (ref 1.2–3.4)
LYMPHOCYTES # BLD AUTO: 15.5 % — LOW (ref 20.5–51.1)
MAGNESIUM SERPL-MCNC: 1.9 MG/DL — SIGNIFICANT CHANGE UP (ref 1.8–2.4)
MCHC RBC-ENTMCNC: 31.6 PG — HIGH (ref 27–31)
MCHC RBC-ENTMCNC: 33.3 G/DL — SIGNIFICANT CHANGE UP (ref 32–37)
MCV RBC AUTO: 95.1 FL — HIGH (ref 80–94)
MONOCYTES # BLD AUTO: 1.08 K/UL — HIGH (ref 0.1–0.6)
MONOCYTES NFR BLD AUTO: 12 % — HIGH (ref 1.7–9.3)
NEUTROPHILS # BLD AUTO: 6 K/UL — SIGNIFICANT CHANGE UP (ref 1.4–6.5)
NEUTROPHILS NFR BLD AUTO: 66.9 % — SIGNIFICANT CHANGE UP (ref 42.2–75.2)
NRBC # BLD: 0 /100 WBCS — SIGNIFICANT CHANGE UP (ref 0–0)
PLATELET # BLD AUTO: 186 K/UL — SIGNIFICANT CHANGE UP (ref 130–400)
POTASSIUM SERPL-MCNC: 4 MMOL/L — SIGNIFICANT CHANGE UP (ref 3.5–5)
POTASSIUM SERPL-SCNC: 4 MMOL/L — SIGNIFICANT CHANGE UP (ref 3.5–5)
RBC # BLD: 4.52 M/UL — LOW (ref 4.7–6.1)
RBC # FLD: 13 % — SIGNIFICANT CHANGE UP (ref 11.5–14.5)
SARS-COV-2 IGG+IGM SERPL QL IA: 19.7 U/ML — HIGH
SARS-COV-2 IGG+IGM SERPL QL IA: POSITIVE
SODIUM SERPL-SCNC: 137 MMOL/L — SIGNIFICANT CHANGE UP (ref 135–146)
TROPONIN T SERPL-MCNC: <0.01 NG/ML — SIGNIFICANT CHANGE UP
WBC # BLD: 8.97 K/UL — SIGNIFICANT CHANGE UP (ref 4.8–10.8)
WBC # FLD AUTO: 8.97 K/UL — SIGNIFICANT CHANGE UP (ref 4.8–10.8)

## 2021-07-04 PROCEDURE — 99238 HOSP IP/OBS DSCHRG MGMT 30/<: CPT

## 2021-07-04 RX ORDER — PANTOPRAZOLE SODIUM 20 MG/1
40 TABLET, DELAYED RELEASE ORAL
Refills: 0 | Status: DISCONTINUED | OUTPATIENT
Start: 2021-07-04 | End: 2021-07-04

## 2021-07-04 RX ORDER — PANTOPRAZOLE SODIUM 20 MG/1
1 TABLET, DELAYED RELEASE ORAL
Qty: 30 | Refills: 0
Start: 2021-07-04 | End: 2021-08-02

## 2021-07-04 RX ADMIN — PANTOPRAZOLE SODIUM 40 MILLIGRAM(S): 20 TABLET, DELAYED RELEASE ORAL at 05:01

## 2021-07-04 RX ADMIN — Medication 81 MILLIGRAM(S): at 05:01

## 2021-07-04 RX ADMIN — LISINOPRIL 20 MILLIGRAM(S): 2.5 TABLET ORAL at 05:01

## 2021-07-04 RX ADMIN — FAMOTIDINE 20 MILLIGRAM(S): 10 INJECTION INTRAVENOUS at 05:01

## 2021-07-04 NOTE — DISCHARGE NOTE PROVIDER - NSDCQMAMI_CARD_ALL_CORE
Problem: Patient Care Overview  Goal: Plan of Care Review  Outcome: Ongoing (interventions implemented as appropriate)  Pt on RA with sats of 93%. Will continue to monitor.          No

## 2021-07-04 NOTE — DISCHARGE NOTE NURSING/CASE MANAGEMENT/SOCIAL WORK - NSDCPEWEB_GEN_ALL_CORE
[facility] for Tobacco Control website --- [email or web address] --- [email or web address]

## 2021-07-04 NOTE — DISCHARGE NOTE PROVIDER - NSDCMRMEDTOKEN_GEN_ALL_CORE_FT
aspirin 81 mg oral tablet, chewable: 1 tab(s) orally 2 times a day   dilTIAZem 240 mg/24 hours oral capsule, extended release: 1 cap(s) orally once a day  donepezil 5 mg oral tablet: 1 tab(s) orally once a day  famotidine 20 mg oral tablet: 1 tab(s) orally 2 times a day  Flomax 0.4 mg oral capsule: 1 cap(s) orally once a day  isosorbide:   lisinopril 20 mg oral tablet: 1 tab(s) orally once a day  pantoprazole 40 mg oral delayed release tablet: 1 tab(s) orally once a day (before a meal)  propranolol 20 mg oral tablet: 1 tab(s) orally once a day  rosuvastatin 20 mg oral tablet: 1 tab(s) orally once a day

## 2021-07-04 NOTE — DISCHARGE NOTE PROVIDER - NSDCCPCAREPLAN_GEN_ALL_CORE_FT
PRINCIPAL DISCHARGE DIAGNOSIS  Diagnosis: Dyspepsia  Assessment and Plan of Treatment: Follow with your primary care doctor and continue antacid.

## 2021-07-04 NOTE — DISCHARGE NOTE NURSING/CASE MANAGEMENT/SOCIAL WORK - NSDCPEEMAIL_GEN_ALL_CORE
[facility] for Tobacco Control email [email or web address]

## 2021-07-04 NOTE — DISCHARGE NOTE NURSING/CASE MANAGEMENT/SOCIAL WORK - PATIENT PORTAL LINK FT
You can access the FollowMyHealth Patient Portal offered by [facility] by registering at the following website: [email or web address]. By joining [employer]’s FollowMyHealth portal, you will also be able to view your health information using other applications (apps) compatible with our system.

## 2021-07-12 ENCOUNTER — INPATIENT (INPATIENT)
Facility: HOSPITAL | Age: 86
LOS: 2 days | Discharge: HOME | End: 2021-07-15
Attending: HOSPITALIST | Admitting: HOSPITALIST
Payer: MEDICARE

## 2021-07-12 VITALS
RESPIRATION RATE: 20 BRPM | SYSTOLIC BLOOD PRESSURE: 128 MMHG | HEIGHT: 66 IN | DIASTOLIC BLOOD PRESSURE: 81 MMHG | OXYGEN SATURATION: 100 % | HEART RATE: 51 BPM | TEMPERATURE: 98 F | WEIGHT: 141.98 LBS

## 2021-07-12 DIAGNOSIS — Z98.890 OTHER SPECIFIED POSTPROCEDURAL STATES: Chronic | ICD-10-CM

## 2021-07-12 PROBLEM — K21.9 GASTRO-ESOPHAGEAL REFLUX DISEASE WITHOUT ESOPHAGITIS: Chronic | Status: ACTIVE | Noted: 2021-07-03

## 2021-07-12 PROBLEM — N40.0 BENIGN PROSTATIC HYPERPLASIA WITHOUT LOWER URINARY TRACT SYMPTOMS: Chronic | Status: ACTIVE | Noted: 2021-07-03

## 2021-07-12 LAB
ALBUMIN SERPL ELPH-MCNC: 4.5 G/DL — SIGNIFICANT CHANGE UP (ref 3.5–5.2)
ALP SERPL-CCNC: 112 U/L — SIGNIFICANT CHANGE UP (ref 30–115)
ALT FLD-CCNC: 11 U/L — SIGNIFICANT CHANGE UP (ref 0–41)
ANION GAP SERPL CALC-SCNC: 10 MMOL/L — SIGNIFICANT CHANGE UP (ref 7–14)
AST SERPL-CCNC: 14 U/L — SIGNIFICANT CHANGE UP (ref 0–41)
BASOPHILS # BLD AUTO: 0.11 K/UL — SIGNIFICANT CHANGE UP (ref 0–0.2)
BASOPHILS NFR BLD AUTO: 1 % — SIGNIFICANT CHANGE UP (ref 0–1)
BILIRUB SERPL-MCNC: 0.4 MG/DL — SIGNIFICANT CHANGE UP (ref 0.2–1.2)
BUN SERPL-MCNC: 22 MG/DL — HIGH (ref 10–20)
CALCIUM SERPL-MCNC: 9.6 MG/DL — SIGNIFICANT CHANGE UP (ref 8.5–10.1)
CHLORIDE SERPL-SCNC: 102 MMOL/L — SIGNIFICANT CHANGE UP (ref 98–110)
CO2 SERPL-SCNC: 28 MMOL/L — SIGNIFICANT CHANGE UP (ref 17–32)
CREAT SERPL-MCNC: 0.9 MG/DL — SIGNIFICANT CHANGE UP (ref 0.7–1.5)
EOSINOPHIL # BLD AUTO: 0.61 K/UL — SIGNIFICANT CHANGE UP (ref 0–0.7)
EOSINOPHIL NFR BLD AUTO: 5.3 % — SIGNIFICANT CHANGE UP (ref 0–8)
GLUCOSE SERPL-MCNC: 142 MG/DL — HIGH (ref 70–99)
HCT VFR BLD CALC: 46.4 % — SIGNIFICANT CHANGE UP (ref 42–52)
HGB BLD-MCNC: 15.3 G/DL — SIGNIFICANT CHANGE UP (ref 14–18)
IMM GRANULOCYTES NFR BLD AUTO: 0.3 % — SIGNIFICANT CHANGE UP (ref 0.1–0.3)
LACTATE SERPL-SCNC: 2.1 MMOL/L — HIGH (ref 0.7–2)
LIDOCAIN IGE QN: 26 U/L — SIGNIFICANT CHANGE UP (ref 7–60)
LYMPHOCYTES # BLD AUTO: 18.7 % — LOW (ref 20.5–51.1)
LYMPHOCYTES # BLD AUTO: 2.15 K/UL — SIGNIFICANT CHANGE UP (ref 1.2–3.4)
MCHC RBC-ENTMCNC: 31.8 PG — HIGH (ref 27–31)
MCHC RBC-ENTMCNC: 33 G/DL — SIGNIFICANT CHANGE UP (ref 32–37)
MCV RBC AUTO: 96.5 FL — HIGH (ref 80–94)
MONOCYTES # BLD AUTO: 1.49 K/UL — HIGH (ref 0.1–0.6)
MONOCYTES NFR BLD AUTO: 13 % — HIGH (ref 1.7–9.3)
NEUTROPHILS # BLD AUTO: 7.08 K/UL — HIGH (ref 1.4–6.5)
NEUTROPHILS NFR BLD AUTO: 61.7 % — SIGNIFICANT CHANGE UP (ref 42.2–75.2)
NRBC # BLD: 0 /100 WBCS — SIGNIFICANT CHANGE UP (ref 0–0)
NT-PROBNP SERPL-SCNC: 57 PG/ML — SIGNIFICANT CHANGE UP (ref 0–300)
PLATELET # BLD AUTO: 228 K/UL — SIGNIFICANT CHANGE UP (ref 130–400)
POTASSIUM SERPL-MCNC: 5.4 MMOL/L — HIGH (ref 3.5–5)
POTASSIUM SERPL-SCNC: 5.4 MMOL/L — HIGH (ref 3.5–5)
PROT SERPL-MCNC: 6.7 G/DL — SIGNIFICANT CHANGE UP (ref 6–8)
RBC # BLD: 4.81 M/UL — SIGNIFICANT CHANGE UP (ref 4.7–6.1)
RBC # FLD: 13.1 % — SIGNIFICANT CHANGE UP (ref 11.5–14.5)
SARS-COV-2 RNA SPEC QL NAA+PROBE: SIGNIFICANT CHANGE UP
SODIUM SERPL-SCNC: 140 MMOL/L — SIGNIFICANT CHANGE UP (ref 135–146)
TROPONIN T SERPL-MCNC: <0.01 NG/ML — SIGNIFICANT CHANGE UP
WBC # BLD: 11.48 K/UL — HIGH (ref 4.8–10.8)
WBC # FLD AUTO: 11.48 K/UL — HIGH (ref 4.8–10.8)

## 2021-07-12 PROCEDURE — 99285 EMERGENCY DEPT VISIT HI MDM: CPT

## 2021-07-12 PROCEDURE — 74177 CT ABD & PELVIS W/CONTRAST: CPT | Mod: 26,MA

## 2021-07-12 PROCEDURE — 93010 ELECTROCARDIOGRAM REPORT: CPT

## 2021-07-12 PROCEDURE — 71045 X-RAY EXAM CHEST 1 VIEW: CPT | Mod: 26

## 2021-07-12 PROCEDURE — 76705 ECHO EXAM OF ABDOMEN: CPT | Mod: 26

## 2021-07-12 PROCEDURE — 99222 1ST HOSP IP/OBS MODERATE 55: CPT

## 2021-07-12 RX ORDER — PANTOPRAZOLE SODIUM 20 MG/1
40 TABLET, DELAYED RELEASE ORAL
Refills: 0 | Status: DISCONTINUED | OUTPATIENT
Start: 2021-07-12 | End: 2021-07-15

## 2021-07-12 RX ORDER — METRONIDAZOLE 500 MG
500 TABLET ORAL EVERY 8 HOURS
Refills: 0 | Status: DISCONTINUED | OUTPATIENT
Start: 2021-07-12 | End: 2021-07-15

## 2021-07-12 RX ORDER — ACETAMINOPHEN 500 MG
650 TABLET ORAL EVERY 6 HOURS
Refills: 0 | Status: DISCONTINUED | OUTPATIENT
Start: 2021-07-12 | End: 2021-07-15

## 2021-07-12 RX ORDER — ONDANSETRON 8 MG/1
4 TABLET, FILM COATED ORAL ONCE
Refills: 0 | Status: COMPLETED | OUTPATIENT
Start: 2021-07-12 | End: 2021-07-12

## 2021-07-12 RX ORDER — KETOROLAC TROMETHAMINE 30 MG/ML
30 SYRINGE (ML) INJECTION EVERY 6 HOURS
Refills: 0 | Status: DISCONTINUED | OUTPATIENT
Start: 2021-07-12 | End: 2021-07-15

## 2021-07-12 RX ORDER — DILTIAZEM HCL 120 MG
240 CAPSULE, EXT RELEASE 24 HR ORAL DAILY
Refills: 0 | Status: DISCONTINUED | OUTPATIENT
Start: 2021-07-12 | End: 2021-07-15

## 2021-07-12 RX ORDER — SODIUM CHLORIDE 9 MG/ML
1000 INJECTION INTRAMUSCULAR; INTRAVENOUS; SUBCUTANEOUS
Refills: 0 | Status: DISCONTINUED | OUTPATIENT
Start: 2021-07-12 | End: 2021-07-15

## 2021-07-12 RX ORDER — ONDANSETRON 8 MG/1
4 TABLET, FILM COATED ORAL EVERY 6 HOURS
Refills: 0 | Status: DISCONTINUED | OUTPATIENT
Start: 2021-07-12 | End: 2021-07-15

## 2021-07-12 RX ORDER — ATORVASTATIN CALCIUM 80 MG/1
80 TABLET, FILM COATED ORAL AT BEDTIME
Refills: 0 | Status: DISCONTINUED | OUTPATIENT
Start: 2021-07-12 | End: 2021-07-15

## 2021-07-12 RX ORDER — HEPARIN SODIUM 5000 [USP'U]/ML
5000 INJECTION INTRAVENOUS; SUBCUTANEOUS EVERY 12 HOURS
Refills: 0 | Status: DISCONTINUED | OUTPATIENT
Start: 2021-07-12 | End: 2021-07-15

## 2021-07-12 RX ORDER — MORPHINE SULFATE 50 MG/1
4 CAPSULE, EXTENDED RELEASE ORAL ONCE
Refills: 0 | Status: DISCONTINUED | OUTPATIENT
Start: 2021-07-12 | End: 2021-07-12

## 2021-07-12 RX ORDER — TAMSULOSIN HYDROCHLORIDE 0.4 MG/1
0.4 CAPSULE ORAL AT BEDTIME
Refills: 0 | Status: DISCONTINUED | OUTPATIENT
Start: 2021-07-12 | End: 2021-07-15

## 2021-07-12 RX ORDER — ASPIRIN/CALCIUM CARB/MAGNESIUM 324 MG
81 TABLET ORAL DAILY
Refills: 0 | Status: DISCONTINUED | OUTPATIENT
Start: 2021-07-12 | End: 2021-07-15

## 2021-07-12 RX ORDER — METOCLOPRAMIDE HCL 10 MG
10 TABLET ORAL ONCE
Refills: 0 | Status: COMPLETED | OUTPATIENT
Start: 2021-07-12 | End: 2021-07-12

## 2021-07-12 RX ORDER — FAMOTIDINE 10 MG/ML
20 INJECTION INTRAVENOUS
Refills: 0 | Status: DISCONTINUED | OUTPATIENT
Start: 2021-07-12 | End: 2021-07-15

## 2021-07-12 RX ORDER — CIPROFLOXACIN LACTATE 400MG/40ML
400 VIAL (ML) INTRAVENOUS ONCE
Refills: 0 | Status: COMPLETED | OUTPATIENT
Start: 2021-07-12 | End: 2021-07-12

## 2021-07-12 RX ORDER — NICOTINE POLACRILEX 2 MG
1 GUM BUCCAL DAILY
Refills: 0 | Status: DISCONTINUED | OUTPATIENT
Start: 2021-07-12 | End: 2021-07-15

## 2021-07-12 RX ORDER — LISINOPRIL 2.5 MG/1
20 TABLET ORAL DAILY
Refills: 0 | Status: DISCONTINUED | OUTPATIENT
Start: 2021-07-12 | End: 2021-07-15

## 2021-07-12 RX ORDER — SODIUM CHLORIDE 9 MG/ML
1000 INJECTION, SOLUTION INTRAVENOUS ONCE
Refills: 0 | Status: COMPLETED | OUTPATIENT
Start: 2021-07-12 | End: 2021-07-12

## 2021-07-12 RX ORDER — SODIUM ZIRCONIUM CYCLOSILICATE 10 G/10G
10 POWDER, FOR SUSPENSION ORAL ONCE
Refills: 0 | Status: COMPLETED | OUTPATIENT
Start: 2021-07-12 | End: 2021-07-12

## 2021-07-12 RX ORDER — DONEPEZIL HYDROCHLORIDE 10 MG/1
5 TABLET, FILM COATED ORAL AT BEDTIME
Refills: 0 | Status: DISCONTINUED | OUTPATIENT
Start: 2021-07-12 | End: 2021-07-15

## 2021-07-12 RX ORDER — METRONIDAZOLE 500 MG
500 TABLET ORAL ONCE
Refills: 0 | Status: COMPLETED | OUTPATIENT
Start: 2021-07-12 | End: 2021-07-12

## 2021-07-12 RX ORDER — CIPROFLOXACIN LACTATE 400MG/40ML
400 VIAL (ML) INTRAVENOUS EVERY 12 HOURS
Refills: 0 | Status: DISCONTINUED | OUTPATIENT
Start: 2021-07-12 | End: 2021-07-15

## 2021-07-12 RX ORDER — MORPHINE SULFATE 50 MG/1
2 CAPSULE, EXTENDED RELEASE ORAL ONCE
Refills: 0 | Status: DISCONTINUED | OUTPATIENT
Start: 2021-07-12 | End: 2021-07-12

## 2021-07-12 RX ORDER — ISOSORBIDE MONONITRATE 60 MG/1
30 TABLET, EXTENDED RELEASE ORAL DAILY
Refills: 0 | Status: DISCONTINUED | OUTPATIENT
Start: 2021-07-12 | End: 2021-07-15

## 2021-07-12 RX ADMIN — Medication 104 MILLIGRAM(S): at 10:38

## 2021-07-12 RX ADMIN — Medication 30 MILLIGRAM(S): at 13:29

## 2021-07-12 RX ADMIN — TAMSULOSIN HYDROCHLORIDE 0.4 MILLIGRAM(S): 0.4 CAPSULE ORAL at 22:32

## 2021-07-12 RX ADMIN — DONEPEZIL HYDROCHLORIDE 5 MILLIGRAM(S): 10 TABLET, FILM COATED ORAL at 21:16

## 2021-07-12 RX ADMIN — Medication 100 MILLIGRAM(S): at 10:48

## 2021-07-12 RX ADMIN — ONDANSETRON 4 MILLIGRAM(S): 8 TABLET, FILM COATED ORAL at 07:24

## 2021-07-12 RX ADMIN — Medication 100 MILLIGRAM(S): at 16:15

## 2021-07-12 RX ADMIN — Medication 200 MILLIGRAM(S): at 18:49

## 2021-07-12 RX ADMIN — FAMOTIDINE 20 MILLIGRAM(S): 10 INJECTION INTRAVENOUS at 18:49

## 2021-07-12 RX ADMIN — SODIUM CHLORIDE 100 MILLILITER(S): 9 INJECTION INTRAMUSCULAR; INTRAVENOUS; SUBCUTANEOUS at 13:50

## 2021-07-12 RX ADMIN — HEPARIN SODIUM 5000 UNIT(S): 5000 INJECTION INTRAVENOUS; SUBCUTANEOUS at 18:50

## 2021-07-12 RX ADMIN — Medication 200 MILLIGRAM(S): at 10:48

## 2021-07-12 RX ADMIN — MORPHINE SULFATE 2 MILLIGRAM(S): 50 CAPSULE, EXTENDED RELEASE ORAL at 09:32

## 2021-07-12 RX ADMIN — MORPHINE SULFATE 4 MILLIGRAM(S): 50 CAPSULE, EXTENDED RELEASE ORAL at 07:24

## 2021-07-12 RX ADMIN — Medication 100 MILLIGRAM(S): at 22:32

## 2021-07-12 RX ADMIN — SODIUM ZIRCONIUM CYCLOSILICATE 10 GRAM(S): 10 POWDER, FOR SUSPENSION ORAL at 13:29

## 2021-07-12 RX ADMIN — SODIUM CHLORIDE 1000 MILLILITER(S): 9 INJECTION, SOLUTION INTRAVENOUS at 07:24

## 2021-07-12 RX ADMIN — ONDANSETRON 4 MILLIGRAM(S): 8 TABLET, FILM COATED ORAL at 09:32

## 2021-07-12 RX ADMIN — ATORVASTATIN CALCIUM 80 MILLIGRAM(S): 80 TABLET, FILM COATED ORAL at 21:16

## 2021-07-12 NOTE — H&P ADULT - NSHPLABSRESULTS_GEN_ALL_CORE
15.3   11.48 )-----------( 228      ( 12 Jul 2021 07:21 )             46.4       07-12    140  |  102  |  22<H>  ----------------------------<  142<H>  5.4<H>   |  28  |  0.9    Ca    9.6      12 Jul 2021 07:21    TPro  6.7  /  Alb  4.5  /  TBili  0.4  /  DBili  x   /  AST  14  /  ALT  11  /  AlkPhos  112  07-12            Lactate Trend  07-12 @ 07:21 Lactate:2.1       CARDIAC MARKERS ( 12 Jul 2021 07:21 )  x     / <0.01 ng/mL / x     / x     / x          US Abdomen Upper Quadrant Right (07.12.21 @ 10:03)  IMPRESSION:  Left hepatic lobe 2.4 cm hemangioma correlating with referenced CT.  Otherwise, unremarkable right upper quadrant sonogram.    CT Abdomen and Pelvis w/ IV Cont (07.12.21 @ 09:02)  IMPRESSION:  1.  Mild circumferential distal ileal wall thickening with surrounding hyperemia and subcentimeter adjacent lymph nodes, suggestive of infectious or inflammatory ileitis.  2.  Colonic diverticulosis without evidence for acute diverticulitis.  3.  Prostatomegaly.    Xray Chest 1 View-PORTABLE IMMEDIATE (Xray Chest 1 View-PORTABLE IMMEDIATE .) (07.12.21 @ 07:19)  Impression:  No radiographic evidence of acute cardiopulmonary disease.

## 2021-07-12 NOTE — ED ADULT NURSE NOTE - NSSEPSISCRITERIAMET_ED_A_ED
[FreeTextEntry3] : I, Karen Luna, authored this note working as a medical scribe for Dr. Hameed.  01/04/2019.  9:45AM. \par This note was authored by Karen Luna working as medical scribe for me. I have reviewed, edited, and revised the note as needed. I am in agreement with the exam findings, imaging findings, and treatment plan.  Kimani Hameed MD  Abnormal Lactate

## 2021-07-12 NOTE — ED PROVIDER NOTE - CLINICAL SUMMARY MEDICAL DECISION MAKING FREE TEXT BOX
elderly pt with abd pain requirung morphine to control sx.  abnormal imaging.  In my opinion, in patient treatment is medically justifiable and appropriate.

## 2021-07-12 NOTE — ED PROVIDER NOTE - PHYSICAL EXAMINATION
CONSTITUTIONAL: well-appearing, in NAD  SKIN: Warm dry, normal skin turgor  HEAD: NCAT  EYES: EOMI, PERRLA, no scleral icterus, conjunctiva pink  ENT: dry mucous membranes with no erythema or exudates  NECK: Supple; non tender. Full ROM.  CARD: regular rhythm, bradycardic, no murmurs.  RESP: clear to ausculation b/l. No crackles or wheezing.  ABD: soft, mildly tender diffusely, non-distended, no rebound or guarding.  EXT: Full ROM, no bony tenderness, no pedal edema, no calf tenderness  NEURO: normal motor. normal sensory. Normal gait.  PSYCH: Cooperative, appropriate.

## 2021-07-12 NOTE — H&P ADULT - NSHPPHYSICALEXAM_GEN_ALL_CORE
VITAL SIGNS: Vital Signs Last 24 Hrs  T(C): 36.2 (12 Jul 2021 12:59), Max: 36.4 (12 Jul 2021 06:53)  T(F): 97.1 (12 Jul 2021 12:59), Max: 97.6 (12 Jul 2021 06:53)  HR: 77 (12 Jul 2021 12:59) (51 - 77)  BP: 153/84 (12 Jul 2021 12:59) (128/81 - 153/84)  RR: 20 (12 Jul 2021 06:53) (20 - 20)  SpO2: 100% (12 Jul 2021 06:53) (100% - 100%)    GENERAL: lying in bed to the side   HEAD:  Atraumatic, Normocephalic  EYES: Conjunctiva and sclera clear  ENT: Moist mucous membranes  NECK: Supple  CHEST/LUNG: Clear to auscultation bilaterally. Unlabored respirations  HEART: Regular rate and rhythm  ABDOMEN: Soft, Nontender even to deep palpation, Mildly distended   EXTREMITIES:  No calf tenderness   NERVOUS SYSTEM:  Alert & Oriented X3, speech clear  MSK: FROM all 4 extremities, full and equal strength  SKIN: No rashes or lesions

## 2021-07-12 NOTE — H&P ADULT - ASSESSMENT
Patient is an 86 year old Male with a past medical history of HTN, HLD, BPH, GERD, S/P hiatal hernia repair presented to the ER with a chief complaint of abdominal pain x 2am. Patient is being admitted to medicine for ileitis.     # Ileitis   # Leukocytosis   - NPO, slowly advance diet as tolerated    - Intravenous Antibiotics - Cipro flagyl   - Intravenous Fluids   - Serial abdominal exams   - Follow up Leukocytosis   - Follow up labs and vital signs     # Lactic acidosis   - Intravenous Fluids   - Trend lactate     # HTN    - Monitor VS   - DASH diet   - Continue with home medications - Propranolol, Diltiazem, Lisinopril and Imdur     # HLD    - DASH diet   - Rosuvastatin is nonformulary, will give equivalent atorvastatin     # GERD   - Continue with Famotidine and Protonix     # BPH   - Continue with Flomax     # Nicotine abuse, quitted 1 month ago   - Patient wants a nicotine patch  Patient is an 86 year old Male with a past medical history of HTN, HLD, BPH, GERD, S/P hiatal hernia repair presented to the ER with a chief complaint of abdominal pain x 2am. Patient is being admitted to medicine for ileitis.     # Ileitis   # Leukocytosis   - Clear liquid, slowly advance diet as tolerated    - Intravenous Antibiotics - Cipro flagyl   - Intravenous Fluids   - Serial abdominal exams   - Follow up Leukocytosis   - Follow up labs and vital signs   - GI Consult   - ID Consult    # Nausea without vomiting   - Zofran PRN   - Slowly advance diet as tolerating     # Lactic acidosis   - Intravenous Fluids   - Trend lactate   - ESR / CRP in am     # HTN    - Monitor VS   - DASH diet   - Continue with home medications - Propranolol, Diltiazem, Lisinopril and Imdur     # HLD    - DASH diet   - Rosuvastatin is nonformulary, will give equivalent atorvastatin     # GERD   - Continue with Famotidine and Protonix     # BPH   - Continue with Flomax     # Nicotine abuse, quitted 1 month ago   - Patient wants a nicotine patch

## 2021-07-12 NOTE — H&P ADULT - HISTORY OF PRESENT ILLNESS
Patient is an 86 year old Male with a past medical history of HTN, HLD, BPH, GERD, S/P hiatal hernia repair presented to the ER with a chief complaint of abdominal pain x 2am. Patient describes constant abrupt abdominal pain, woke him up this am, originates from Left thoracic and radiates to LUQ and LLQ, 9/10, provoked by changing positions, alleviated by pain medications. Chief complaint is associated with nausea without vomiting, 1 episode of nonbloody loose stool. Patient denies fevers, chills, vomiting, chest pain, shortness of breath, palpitations, new onset of / worsening low back pain, constipation, dysuria, new onset of leg swelling, new onset of rashes, abnormal gait. Patient states he had a colonoscopy less than 5 years ago and it was within normal limits, unsure where, when or who did it.

## 2021-07-12 NOTE — H&P ADULT - ATTENDING COMMENTS
86 year old Male with a past medical history of HTN, HLD, BPH, GERD, S/P hiatal hernia repair presented to the ER with a chief complaint of abdominal pain x 2am. Patient admitted for ileitis. PAtient seen and examined at bedside in ER. Patient reports his symptoms have now improved.    PE:  Gen: NAD, pleasant  HEENT: AT, NC, EOMI, MMM  Resp: CTAb/l, no w/r/r  CVS: RRR, no m/r/g, no LE edema  Abd: soft, slightly distended, mild tenderness in left abd, no guarding no rigidity, +BS  Neuro: AAOX3, no focal deficit  Psych: appropiate mood, normal affect    86 year old Male with a past medical history of HTN, HLD, BPH, GERD, S/P hiatal hernia repair presented to the ER with a chief complaint of abdominal pain x 2am.    Abd pain 2/2 ileitis  Leukocytosis   -IVF  -CLD, supportive care  -cipro and flagyl for now  -serial bowel exam  -GI and ID consult  -f/u ESR and CRP    Patient was seen examined at bedside; case was discussed with PA and agree with HPI and A&P.

## 2021-07-12 NOTE — H&P ADULT - NSHPSOCIALHISTORY_GEN_ALL_CORE
Tobacco use: Exsmoker, quit 1 month ago, 1/2 pack a day x 68 years    EtOH use: Occasional   Illicit drug use: Denies

## 2021-07-12 NOTE — ED PROVIDER NOTE - OBJECTIVE STATEMENT
85 yo M with PMH of HTN, HLD, GERD, BPH, h/o hernia repair presenting with abdominal pain since 2 am. Patient is unable to describe how it feels other than severe. Had one episode of loose stools, and feels nauseous, but no vomiting. Denies fever, chills, headache, vision changes, dizziness, chest pain, shortness of breath, dysuria, hematuria, melena, hematochezia.

## 2021-07-12 NOTE — ED PROVIDER NOTE - ATTENDING CONTRIBUTION TO CARE
RUQ ttp.  mild guarding.  pt required morphine to control pain.  dx testing reviewed.  pt needs NPO, IV abx.  IV analgesia.

## 2021-07-13 LAB
ALBUMIN SERPL ELPH-MCNC: 3.3 G/DL — LOW (ref 3.5–5.2)
ALP SERPL-CCNC: 86 U/L — SIGNIFICANT CHANGE UP (ref 30–115)
ALT FLD-CCNC: 27 U/L — SIGNIFICANT CHANGE UP (ref 0–41)
ANION GAP SERPL CALC-SCNC: 12 MMOL/L — SIGNIFICANT CHANGE UP (ref 7–14)
AST SERPL-CCNC: 27 U/L — SIGNIFICANT CHANGE UP (ref 0–41)
BASOPHILS # BLD AUTO: 0.06 K/UL — SIGNIFICANT CHANGE UP (ref 0–0.2)
BASOPHILS NFR BLD AUTO: 0.4 % — SIGNIFICANT CHANGE UP (ref 0–1)
BILIRUB SERPL-MCNC: 1.2 MG/DL — SIGNIFICANT CHANGE UP (ref 0.2–1.2)
BUN SERPL-MCNC: 18 MG/DL — SIGNIFICANT CHANGE UP (ref 10–20)
CALCIUM SERPL-MCNC: 8.3 MG/DL — LOW (ref 8.5–10.1)
CHLORIDE SERPL-SCNC: 99 MMOL/L — SIGNIFICANT CHANGE UP (ref 98–110)
CO2 SERPL-SCNC: 23 MMOL/L — SIGNIFICANT CHANGE UP (ref 17–32)
COVID-19 SPIKE DOMAIN AB INTERP: POSITIVE
COVID-19 SPIKE DOMAIN ANTIBODY RESULT: 16.5 U/ML — HIGH
CREAT SERPL-MCNC: 0.9 MG/DL — SIGNIFICANT CHANGE UP (ref 0.7–1.5)
CRP SERPL-MCNC: 83 MG/L — HIGH
EOSINOPHIL # BLD AUTO: 0.05 K/UL — SIGNIFICANT CHANGE UP (ref 0–0.7)
EOSINOPHIL NFR BLD AUTO: 0.3 % — SIGNIFICANT CHANGE UP (ref 0–8)
ERYTHROCYTE [SEDIMENTATION RATE] IN BLOOD: 4 MM/HR — SIGNIFICANT CHANGE UP (ref 0–10)
GLUCOSE SERPL-MCNC: 123 MG/DL — HIGH (ref 70–99)
HCT VFR BLD CALC: 41.5 % — LOW (ref 42–52)
HGB BLD-MCNC: 14 G/DL — SIGNIFICANT CHANGE UP (ref 14–18)
IMM GRANULOCYTES NFR BLD AUTO: 0.8 % — HIGH (ref 0.1–0.3)
LACTATE SERPL-SCNC: 1.3 MMOL/L — SIGNIFICANT CHANGE UP (ref 0.7–2)
LYMPHOCYTES # BLD AUTO: 0.8 K/UL — LOW (ref 1.2–3.4)
LYMPHOCYTES # BLD AUTO: 4.7 % — LOW (ref 20.5–51.1)
MAGNESIUM SERPL-MCNC: 1.5 MG/DL — LOW (ref 1.8–2.4)
MCHC RBC-ENTMCNC: 31.7 PG — HIGH (ref 27–31)
MCHC RBC-ENTMCNC: 33.7 G/DL — SIGNIFICANT CHANGE UP (ref 32–37)
MCV RBC AUTO: 94.1 FL — HIGH (ref 80–94)
MONOCYTES # BLD AUTO: 1.66 K/UL — HIGH (ref 0.1–0.6)
MONOCYTES NFR BLD AUTO: 9.8 % — HIGH (ref 1.7–9.3)
NEUTROPHILS # BLD AUTO: 14.28 K/UL — HIGH (ref 1.4–6.5)
NEUTROPHILS NFR BLD AUTO: 84 % — HIGH (ref 42.2–75.2)
NRBC # BLD: 0 /100 WBCS — SIGNIFICANT CHANGE UP (ref 0–0)
PLATELET # BLD AUTO: 174 K/UL — SIGNIFICANT CHANGE UP (ref 130–400)
POTASSIUM SERPL-MCNC: 4 MMOL/L — SIGNIFICANT CHANGE UP (ref 3.5–5)
POTASSIUM SERPL-SCNC: 4 MMOL/L — SIGNIFICANT CHANGE UP (ref 3.5–5)
PROT SERPL-MCNC: 5.2 G/DL — LOW (ref 6–8)
RBC # BLD: 4.41 M/UL — LOW (ref 4.7–6.1)
RBC # FLD: 13 % — SIGNIFICANT CHANGE UP (ref 11.5–14.5)
SARS-COV-2 IGG+IGM SERPL QL IA: 16.5 U/ML — HIGH
SARS-COV-2 IGG+IGM SERPL QL IA: POSITIVE
SODIUM SERPL-SCNC: 134 MMOL/L — LOW (ref 135–146)
WBC # BLD: 16.99 K/UL — HIGH (ref 4.8–10.8)
WBC # FLD AUTO: 16.99 K/UL — HIGH (ref 4.8–10.8)

## 2021-07-13 PROCEDURE — 99223 1ST HOSP IP/OBS HIGH 75: CPT

## 2021-07-13 PROCEDURE — 99232 SBSQ HOSP IP/OBS MODERATE 35: CPT

## 2021-07-13 RX ORDER — MAGNESIUM SULFATE 500 MG/ML
1 VIAL (ML) INJECTION ONCE
Refills: 0 | Status: COMPLETED | OUTPATIENT
Start: 2021-07-13 | End: 2021-07-13

## 2021-07-13 RX ADMIN — Medication 200 MILLIGRAM(S): at 18:18

## 2021-07-13 RX ADMIN — Medication 30 MILLIGRAM(S): at 15:06

## 2021-07-13 RX ADMIN — Medication 1 TABLET(S): at 12:00

## 2021-07-13 RX ADMIN — Medication 1 PATCH: at 18:58

## 2021-07-13 RX ADMIN — ATORVASTATIN CALCIUM 80 MILLIGRAM(S): 80 TABLET, FILM COATED ORAL at 22:06

## 2021-07-13 RX ADMIN — DONEPEZIL HYDROCHLORIDE 5 MILLIGRAM(S): 10 TABLET, FILM COATED ORAL at 22:06

## 2021-07-13 RX ADMIN — Medication 100 GRAM(S): at 12:01

## 2021-07-13 RX ADMIN — Medication 100 MILLIGRAM(S): at 14:50

## 2021-07-13 RX ADMIN — Medication 100 MILLIGRAM(S): at 05:43

## 2021-07-13 RX ADMIN — FAMOTIDINE 20 MILLIGRAM(S): 10 INJECTION INTRAVENOUS at 18:18

## 2021-07-13 RX ADMIN — Medication 100 MILLIGRAM(S): at 22:05

## 2021-07-13 RX ADMIN — HEPARIN SODIUM 5000 UNIT(S): 5000 INJECTION INTRAVENOUS; SUBCUTANEOUS at 05:46

## 2021-07-13 RX ADMIN — Medication 81 MILLIGRAM(S): at 12:00

## 2021-07-13 RX ADMIN — Medication 30 MILLIGRAM(S): at 13:02

## 2021-07-13 RX ADMIN — PANTOPRAZOLE SODIUM 40 MILLIGRAM(S): 20 TABLET, DELAYED RELEASE ORAL at 05:46

## 2021-07-13 RX ADMIN — Medication 200 MILLIGRAM(S): at 05:43

## 2021-07-13 RX ADMIN — ISOSORBIDE MONONITRATE 30 MILLIGRAM(S): 60 TABLET, EXTENDED RELEASE ORAL at 12:00

## 2021-07-13 RX ADMIN — Medication 240 MILLIGRAM(S): at 05:45

## 2021-07-13 RX ADMIN — Medication 1 PATCH: at 12:01

## 2021-07-13 RX ADMIN — TAMSULOSIN HYDROCHLORIDE 0.4 MILLIGRAM(S): 0.4 CAPSULE ORAL at 22:06

## 2021-07-13 RX ADMIN — HEPARIN SODIUM 5000 UNIT(S): 5000 INJECTION INTRAVENOUS; SUBCUTANEOUS at 18:18

## 2021-07-13 RX ADMIN — FAMOTIDINE 20 MILLIGRAM(S): 10 INJECTION INTRAVENOUS at 05:45

## 2021-07-13 NOTE — PROGRESS NOTE ADULT - SUBJECTIVE AND OBJECTIVE BOX
JOSEPH POPE  86y  Male      Patient is a 86y old  Male who presents with a chief complaint of Ileitis (13 Jul 2021 10:12)      INTERVAL HPI/OVERNIGHT EVENTS:  Patient seen and examined earlier this morning. Patient reports feeling better; reports he tolerated CLD this AM. Denies any more abd pain or diarrhea or N/V. Reports he would like to work with PT.      REVIEW OF SYSTEMS:  CONSTITUTIONAL: No fever, weight loss, or fatigue  EYES: No eye pain, visual disturbances, or discharge  ENMT:  No difficulty hearing, tinnitus, vertigo; No sinus or throat pain  NECK: No pain or stiffness  RESPIRATORY: No cough, wheezing, chills or hemoptysis; No shortness of breath  CARDIOVASCULAR: No chest pain, palpitations, dizziness, or leg swelling  GASTROINTESTINAL: No abdominal (reports its resolved now) or epigastric pain. No nausea, vomiting, or hematemesis; No diarrhea or constipation. No melena or hematochezia.  GENITOURINARY: No dysuria, frequency, hematuria, or incontinence  NEUROLOGICAL: No headaches, memory loss, loss of strength, numbness, or tremors  MUSCULOSKELETAL: No joint pain or swelling; No muscle, back, or extremity pain      T(C): 37.4 (07-13-21 @ 05:00), Max: 37.4 (07-13-21 @ 05:00)  HR: 69 (07-13-21 @ 05:00) (59 - 77)  BP: 116/58 (07-13-21 @ 05:00) (116/58 - 153/84)  RR: 18 (07-13-21 @ 05:00) (18 - 18)  SpO2: --    PHYSICAL EXAM:  Gen: NAD, pleasant  HEENT: AT, NC, EOMI, MMM  Resp: CTAb/l, no w/r/r  CVS: RRR, no m/r/g, no LE edema  Abd: soft, slightly distended, mild tenderness in left abd, no guarding no rigidity, +BS  Neuro: AAOX3, no focal deficit  Psych: appropiate mood, normal affect    Consultant(s) Notes Reviewed:  [x ] YES  [ ] NO  Care Discussed with Consultants/Other Providers [ x] YES  [ ] NO    LAB:                        14.0   16.99 )-----------( 174      ( 13 Jul 2021 07:12 )             41.5     07-13    134<L>  |  99  |  18  ----------------------------<  123<H>  4.0   |  23  |  0.9    Ca    8.3<L>      13 Jul 2021 07:12  Mg     1.5     07-13    TPro  5.2<L>  /  Alb  3.3<L>  /  TBili  1.2  /  DBili  x   /  AST  27  /  ALT  27  /  AlkPhos  86  07-13    LIVER FUNCTIONS - ( 13 Jul 2021 07:12 )  Alb: 3.3 g/dL / Pro: 5.2 g/dL / ALK PHOS: 86 U/L / ALT: 27 U/L / AST: 27 U/L / GGT: x           CARDIAC MARKERS ( 12 Jul 2021 07:21 )  x     / <0.01 ng/mL / x     / x     / x                  Drug Dosing Weight  Height (cm): 165.1 (12 Jul 2021 18:05)  Weight (kg): 66.3 (12 Jul 2021 18:05)  BMI (kg/m2): 24.3 (12 Jul 2021 18:05)  BSA (m2): 1.73 (12 Jul 2021 18:05)  Height (cm): 165.1 (07-12-21 @ 18:05)  Weight (kg): 66.3 (07-12-21 @ 18:05)  BMI (kg/m2): 24.3 (07-12-21 @ 18:05)  BSA (m2): 1.73 (07-12-21 @ 18:05)  CAPILLARY BLOOD GLUCOSE        I&O's Summary    12 Jul 2021 07:01  -  13 Jul 2021 07:00  --------------------------------------------------------  IN: 400 mL / OUT: 0 mL / NET: 400 mL          RADIOLOGY & ADDITIONAL TESTS:  Imaging Personally Reviewed:  [x] YES  [ ] NO    HEALTH ISSUES - PROBLEM Dx:          MEDS:  acetaminophen   Tablet .. 650 milliGRAM(s) Oral every 6 hours PRN  aspirin  chewable 81 milliGRAM(s) Oral daily  atorvastatin 80 milliGRAM(s) Oral at bedtime  ciprofloxacin   IVPB 400 milliGRAM(s) IV Intermittent every 12 hours  diltiazem    milliGRAM(s) Oral daily  donepezil 5 milliGRAM(s) Oral at bedtime  famotidine    Tablet 20 milliGRAM(s) Oral two times a day  heparin   Injectable 5000 Unit(s) SubCutaneous every 12 hours  isosorbide   mononitrate ER Tablet (IMDUR) 30 milliGRAM(s) Oral daily  ketorolac   Injectable 30 milliGRAM(s) IV Push every 6 hours PRN  lisinopril 20 milliGRAM(s) Oral daily  metroNIDAZOLE  IVPB 500 milliGRAM(s) IV Intermittent every 8 hours  multivitamin 1 Tablet(s) Oral daily  nicotine - 21 mG/24Hr(s) Patch 1 patch Transdermal daily  ondansetron Injectable 4 milliGRAM(s) IV Push every 6 hours PRN  pantoprazole    Tablet 40 milliGRAM(s) Oral before breakfast  propranolol 20 milliGRAM(s) Oral daily  sodium chloride 0.9%. 1000 milliLiter(s) IV Continuous <Continuous>  tamsulosin 0.4 milliGRAM(s) Oral at bedtime

## 2021-07-13 NOTE — PHYSICAL THERAPY INITIAL EVALUATION ADULT - ADDITIONAL COMMENTS
Pt lives with wife in house with 1 step to enter, then level living.   Pt amb independently without device, but wife borrowed walker if needed.

## 2021-07-13 NOTE — CONSULT NOTE ADULT - ASSESSMENT
86yMale pmh HTN, BPH, GERD s/p hernia repair presents for abdominal pain LUQ and LLQ 9/10. Patient also reported nausea and vomiting and one loose stool    Problem 1-Mild circumferential distal ileal wall thickening with surrounding hyperemia and subcentimeter adjacent lymph nodes, suggestive of infectious or inflammatory ileitis.  rec  -Continue abx  -lactose free low residue diet  -given leukocytosis if patient develops diarrhea check C-diff and GI PCR  -Consider outpatient colonoscopy with TI intubation and possible MR enterography as an outpatient   - Follow up with our [facility] located at [address] [place] [address]. Phone Number: [phone]       Problem 2-Prostatomegaly  Rec  - Care as per primary team  86yMale pmh HTN, BPH, GERD s/p hernia repair presents for abdominal pain LUQ and LLQ 9/10. Patient also reported nausea and vomiting and one loose stool. Patient reports last colonoscopy two years ago with Dr. Arechiga was normal     Problem 1-Mild circumferential distal ileal wall thickening with surrounding hyperemia and subcentimeter adjacent lymph nodes, suggestive of infectious or inflammatory ileitis.  rec  -Continue abx  -go back to clear liquid diet, patient had some pain with soft diet, will follow  -given leukocytosis if patient develops diarrhea check C-diff and GI PCR  -Consider outpatient colonoscopy with TI intubation and possible MR enterography as an outpatient   - Follow up with our [facility] located at [address] [place] [address]. Phone Number: [phone] or patient's private GI Dr. Arechiga    Problem 2-Prostatomegaly  Rec  - Care as per primary team

## 2021-07-13 NOTE — PROGRESS NOTE ADULT - ASSESSMENT
Patient is an 86 year old Male with a past medical history of HTN, HLD, BPH, GERD, S/P hiatal hernia repair presented to the ER with a chief complaint of abdominal pain x 2am. Patient is being admitted to medicine for ileitis.     # Ileitis- clinically improved  # Leukocytosis- worsening  # Nausea without vomiting- improved  # Lactic acidosis- improved with IVF  - tolerated CLD; will advance to soft diet  - Intravenous Antibiotics - Cipro flagyl; plan to switch to PO prior to dc  - Intravenous Fluids   - Serial abdominal exams   - monitor CBC  - GI Consult appreciated   - ID Consult  - Zofran PRN     # HTN    - Monitor VS   - DASH diet   - Continue with home medications - Propranolol, Diltiazem, Lisinopril and Imdur     # HLD    - DASH diet   - Rosuvastatin is nonformulary, will give equivalent atorvastatin     # GERD   - Continue with Famotidine and Protonix     # BPH   - Continue with Flomax     # Nicotine abuse, quitted 1 month ago   - Patient wants a nicotine patch     Progress Note Handoff  Pending Consults: None  Pending Tests: AM CBC  Pending Results: clinical improvement, improvement in leukocytosis  Family Discussion: Patient  Disposition: Home__X___/SNF______/Other_____/Unknown at this time__likely dc within 24 hours if leukocytosis improves and clinically better___

## 2021-07-13 NOTE — PHYSICAL THERAPY INITIAL EVALUATION ADULT - GENERAL OBSERVATIONS, REHAB EVAL
13:25-13:45 Chart reviewed. Patient available to be seen for physical therapy, denies pain, confirmed with RN.  Pt rec'd in recliner, wife at bedside, pt in NAD.

## 2021-07-13 NOTE — CONSULT NOTE ADULT - ASSESSMENT
ASSESSMENT  86 year old Male with a past medical history of HTN, HLD, BPH, GERD, S/P hiatal hernia repair presented to the ER with a chief complaint of abdominal pain     IMPRESSION  #Ielitis  - CT Abdomen and Pelvis w/ IV Cont (07.12.21 @ 09:02):  Mild circumferential distal ileal wall thickening with surrounding hyperemia and subcentimeter adjacent lymph nodes, suggestive of infectious or inflammatory ileitis.  Colonic diverticulosis without evidence for acute diverticulitis. Prostatomegaly.  - WBC Count: 16.99 K/uL (07.13.21 @ 07:12)  #GERD   #Hiatal Hernia s/p repair    #Obesity BMI (kg/m2): 24.3, 23.5  #Abx allergy: NKDA    RECOMMENDATIONS  - check GI PCR    This is a preliminary incomplete pended note, all final recommendations to follow after interview and examination of the patient.    Please call or message on [employer] Teams if with any questions.  Spectra [phone]   ASSESSMENT  86 year old Male with a past medical history of HTN, HLD, BPH, GERD, S/P hiatal hernia repair presented to the ER with a chief complaint of abdominal pain     IMPRESSION  Ileitis  - CT Abdomen and Pelvis w/ IV Cont (07.12.21 @ 09:02):  Mild circumferential distal ileal wall thickening with surrounding hyperemia and subcentimeter adjacent lymph nodes, suggestive of infectious or inflammatory ileitis.  Colonic diverticulosis without evidence for acute diverticulitis. Prostatomegaly.  - WBC Count: 16.99 K/uL (07.13.21 @ 07:12)  #GERD   #Hiatal Hernia s/p repair    #Obesity BMI (kg/m2): 24.3, 23.5  #Abx allergy: NKDA    RECOMMENDATIONS  - check GI PCR  - can continue cipro/flagyl for now  - monitor diarrhea     Please call or message on [employer] Teams if with any questions.  Spectra [phone]

## 2021-07-13 NOTE — CONSULT NOTE ADULT - ATTENDING COMMENTS
85 yo male with ileitis.  Clear liquid diet  Continue antibiotics  Check C diff and GI PCR  Consider OP colonoscopy

## 2021-07-13 NOTE — CONSULT NOTE ADULT - SUBJECTIVE AND OBJECTIVE BOX
JOSEPH POPE  86y, Male  Allergy: No Known Allergies      CHIEF COMPLAINT: Ileitis (13 Jul 2021 10:54)      LOS  1d    HPI:  Patient is an 86 year old Male with a past medical history of HTN, HLD, BPH, GERD, S/P hiatal hernia repair presented to the ER with a chief complaint of abdominal pain x 2am. Patient describes constant abrupt abdominal pain, woke him up this am, originates from Left thoracic and radiates to LUQ and LLQ, 9/10, provoked by changing positions, alleviated by pain medications. Chief complaint is associated with nausea without vomiting, 1 episode of nonbloody loose stool. Patient denies fevers, chills, vomiting, chest pain, shortness of breath, palpitations, new onset of / worsening low back pain, constipation, dysuria, new onset of leg swelling, new onset of rashes, abnormal gait. Patient states he had a colonoscopy less than 5 years ago and it was within normal limits, unsure where, when or who did it.  (12 Jul 2021 12:51)    INFECTIOUS DISEASE HISTORY:  History as above    PAST MEDICAL & SURGICAL HISTORY:  Hypertension, unspecified type    High cholesterol    Dyslipidemia    Gastroesophageal reflux disease    Enlarged prostate    H/O hernia repair    History of repair of hip fracture        FAMILY HISTORY  No pertinent family history in first degree relatives        SOCIAL HISTORY  Social History:  Tobacco use: Exsmoker, quit 1 month ago, 1/2 pack a day x 68 years    EtOH use: Occasional   Illicit drug use: Denies (12 Jul 2021 12:51)        ROS  General: Denies rigors, nightsweats  HEENT: Denies headache, rhinorrhea, sore throat, eye pain  CV: Denies CP, palpitations  PULM: Denies wheezing, hemoptysis  GI: Denies hematemesis, hematochezia, melena  : Denies discharge, hematuria  MSK: Denies arthralgias, myalgias  SKIN: Denies rash, lesions  NEURO: Denies paresthesias, weakness  PSYCH: Denies depression, anxiety    VITALS:  T(F): 99.3, Max: 99.3 (07-13-21 @ 05:00)  HR: 69  BP: 116/58  RR: 18Vital Signs Last 24 Hrs  T(C): 37.4 (13 Jul 2021 05:00), Max: 37.4 (13 Jul 2021 05:00)  T(F): 99.3 (13 Jul 2021 05:00), Max: 99.3 (13 Jul 2021 05:00)  HR: 69 (13 Jul 2021 05:00) (59 - 77)  BP: 116/58 (13 Jul 2021 05:00) (116/58 - 153/84)  BP(mean): --  RR: 18 (13 Jul 2021 05:00) (18 - 18)  SpO2: --    PHYSICAL EXAM:  Gen: NAD, resting in bed  HEENT: Normocephalic, atraumatic  Neck: supple, no lymphadenopathy  CV: Regular rate & regular rhythm  Lungs: decreased BS at bases, no fremitus  Abdomen: Soft, BS present  Ext: Warm, well perfused  Neuro: non focal, awake  Skin: no rash, no erythema  Lines: no phlebitis    TESTS & MEASUREMENTS:                        14.0   16.99 )-----------( 174      ( 13 Jul 2021 07:12 )             41.5     07-13    134<L>  |  99  |  18  ----------------------------<  123<H>  4.0   |  23  |  0.9    Ca    8.3<L>      13 Jul 2021 07:12  Mg     1.5     07-13    TPro  5.2<L>  /  Alb  3.3<L>  /  TBili  1.2  /  DBili  x   /  AST  27  /  ALT  27  /  AlkPhos  86  07-13    eGFR if Non African American: 77 mL/min/1.73M2 (07-13-21 @ 07:12)  eGFR if African American: 89 mL/min/1.73M2 (07-13-21 @ 07:12)    LIVER FUNCTIONS - ( 13 Jul 2021 07:12 )  Alb: 3.3 g/dL / Pro: 5.2 g/dL / ALK PHOS: 86 U/L / ALT: 27 U/L / AST: 27 U/L / GGT: x                 Lactate, Blood: 1.3 mmol/L (07-13-21 @ 07:12)  Lactate, Blood: 2.1 mmol/L (07-12-21 @ 07:21)      INFECTIOUS DISEASES TESTING  COVID-19 PCR: NotDetec (07-12-21 @ 09:38)      RADIOLOGY & ADDITIONAL TESTS:  I have personally reviewed the last Chest xray  CXR      CT  CT Abdomen and Pelvis w/ IV Cont:   EXAM:  CT ABDOMEN AND PELVIS IC            PROCEDURE DATE:  07/12/2021            INTERPRETATION:  CLINICAL STATEMENT: Abdominal pain    TECHNIQUE: Contiguous axial CT images were obtained from the lower chest to the pubic symphysis following administration of 100cc Optiray 320 intravenous contrast.  Oral contrast was not administered.  Reformatted images in the coronal and sagittal planes were acquired.    COMPARISON CT: August 28, 2019 and August 12, 2019    OTHER STUDIES USED FOR CORRELATION: None.      FINDINGS:    LOWER CHEST: Bibasilar subsegmental dependent atelectasis.    HEPATOBILIARY: Unchanged 2.7 cm hepatic segment 3 hemangioma and a 1.3 cm enhancing lesion in the left hepatic lobe segment 2 also likely a hemangioma. Unchanged additional subcentimeter hypodensities in the liver, too small to characterize. Minimal left intrahepatic biliary ductal dilation, unchanged.    SPLEEN: Unremarkable.    PANCREAS: Unremarkable.    ADRENAL GLANDS: Unremarkable.    KIDNEYS: Symmetric renal enhancement bilaterally. No hydronephrosis. Left interpolar renal cyst and additional subcentimeter hypodensities in both kidneys, too small to characterize.    ABDOMINOPELVIC NODES: No lymphadenopathy. Subcentimeter right lower quadrant mesentericlymph nodes noted.    PELVIC ORGANS: Limited evaluation secondary to streak artifact from right hip hardware. Enlarged prostate gland.    PERITONEUM/MESENTERY/BOWEL: Mild circumferential wall thickening of the distal ileum noted with surrounding hyperemia. No bowel obstruction, ascites or pneumoperitoneum. Colonic diverticulosis, without evidence for acute diverticulitis. Appendix not definitively visualized; no focal pericecal inflammation. Small hiatal hernia present.    BONES/SOFT TISSUES: Post right hip arthroplasty. Stable degenerative changes of the spine. Post bilateral inguinal hernia repairs.    OTHER: Diffuse atherosclerotic vascular calcifications.      IMPRESSION:  1.  Mild circumferential distal ileal wall thickening with surrounding hyperemia and subcentimeter adjacent lymph nodes, suggestive of infectious or inflammatory ileitis.    2.  Colonic diverticulosis without evidence for acute diverticulitis.    3.  Prostatomegaly.    --- End of Report ---              JERAD VELASQUEZ; Attending Radiologist  This document has been electronically signed. Jul 12 2021  9:17AM (07-12-21 @ 09:02)      CARDIOLOGY TESTING  12 Lead ECG:   Ventricular Rate 48 BPM    Atrial Rate 48 BPM    P-R Interval 182 ms    QRS Duration 132 ms    Q-T Interval 482 ms    QTC Calculation(Bazett) 430 ms    P Axis 39 degrees    R Axis -74 degrees    T Axis 1 degrees    Diagnosis Line Marked sinus bradycardia  Left axis deviation  Right bundle branch block  Inferior infarct , age undetermined  Anterolateral infarct , age undetermined  Abnormal ECG    Confirmed by MERON SERRANO MD ([id number]) on 7/12/2021 11:30:13 AM (07-12-21 @ 06:50)  12 Lead ECG:   Ventricular Rate 72 BPM    Atrial Rate 72 BPM    P-R Interval 202 ms    QRS Duration 132 ms    Q-T Interval 430 ms    QTC Calculation(Bazett) 470 ms    P Axis 49 degrees    R Axis 269 degrees    T Axis 3 degrees    Diagnosis Line Normal sinus rhythm  Possible Left atrial enlargement  Right bundle branch block  Possible Inferior infarct , age undetermined  Anterolateral infarct , age undetermined  Abnormal ECG    Confirmed by MERON SERRANO MD ([id number]) on 7/4/2021 10:01:41 AM (07-03-21 @ 21:00)      MEDICATIONS  aspirin  chewable 81 Oral daily  atorvastatin 80 Oral at bedtime  ciprofloxacin   IVPB 400 IV Intermittent every 12 hours  diltiazem    Oral daily  donepezil 5 Oral at bedtime  famotidine    Tablet 20 Oral two times a day  heparin   Injectable 5000 SubCutaneous every 12 hours  isosorbide   mononitrate ER Tablet (IMDUR) 30 Oral daily  lisinopril 20 Oral daily  magnesium sulfate  IVPB 1 IV Intermittent once  metroNIDAZOLE  IVPB 500 IV Intermittent every 8 hours  multivitamin 1 Oral daily  nicotine - 21 mG/24Hr(s) Patch 1 Transdermal daily  pantoprazole    Tablet 40 Oral before breakfast  propranolol 20 Oral daily  sodium chloride 0.9%. 1000 IV Continuous <Continuous>  tamsulosin 0.4 Oral at bedtime      Weight  Weight (kg): 66.3 (07-12-21 @ 18:05)    ANTIBIOTICS:  ciprofloxacin   IVPB 400 milliGRAM(s) IV Intermittent every 12 hours  metroNIDAZOLE  IVPB 500 milliGRAM(s) IV Intermittent every 8 hours      ALLERGIES:  No Known Allergies         TOSHIA JOSEPH  86y, Male  Allergy: No Known Allergies      CHIEF COMPLAINT: Ileitis (13 Jul 2021 10:54)      LOS  1d    HPI:  Patient is an 86 year old Male with a past medical history of HTN, HLD, BPH, GERD, S/P hiatal hernia repair presented to the ER with a chief complaint of abdominal pain x 2am. Patient describes constant abrupt abdominal pain, woke him up this am, originates from Left thoracic and radiates to LUQ and LLQ, 9/10, provoked by changing positions, alleviated by pain medications. Chief complaint is associated with nausea without vomiting, 1 episode of nonbloody loose stool. Patient denies fevers, chills, vomiting, chest pain, shortness of breath, palpitations, new onset of / worsening low back pain, constipation, dysuria, new onset of leg swelling, new onset of rashes, abnormal gait. Patient states he had a colonoscopy less than 5 years ago and it was within normal limits, unsure where, when or who did it.  (12 Jul 2021 12:51)    INFECTIOUS DISEASE HISTORY:  History as above  Reports having diarrhea, but wife and patient say that this has been and on and off problem for quite some time.   Abdominal pain is improved today and is tolerating PO.   Denies any new rashes or joint pains.   No changes in dietary habits; no recent take-out food.   No recent travel     PAST MEDICAL & SURGICAL HISTORY:  Hypertension, unspecified type    High cholesterol    Dyslipidemia    Gastroesophageal reflux disease    Enlarged prostate    H/O hernia repair    History of repair of hip fracture        FAMILY HISTORY  No pertinent family history in first degree relatives        SOCIAL HISTORY  Social History:  Tobacco use: Exsmoker, quit 1 month ago, 1/2 pack a day x 68 years    EtOH use: Occasional   Illicit drug use: Denies (12 Jul 2021 12:51)        ROS  General: Denies rigors, nightsweats  HEENT: Denies headache, rhinorrhea, sore throat, eye pain  CV: Denies CP, palpitations  PULM: Denies wheezing, hemoptysis  GI: Denies hematemesis, hematochezia, melena  : Denies discharge, hematuria  MSK: Denies arthralgias, myalgias  SKIN: Denies rash, lesions  NEURO: Denies paresthesias, weakness  PSYCH: Denies depression, anxiety    VITALS:  T(F): 99.3, Max: 99.3 (07-13-21 @ 05:00)  HR: 69  BP: 116/58  RR: 18Vital Signs Last 24 Hrs  T(C): 37.4 (13 Jul 2021 05:00), Max: 37.4 (13 Jul 2021 05:00)  T(F): 99.3 (13 Jul 2021 05:00), Max: 99.3 (13 Jul 2021 05:00)  HR: 69 (13 Jul 2021 05:00) (59 - 77)  BP: 116/58 (13 Jul 2021 05:00) (116/58 - 153/84)  BP(mean): --  RR: 18 (13 Jul 2021 05:00) (18 - 18)  SpO2: --    PHYSICAL EXAM:  Gen: NAD, resting in bed  HEENT: Normocephalic, atraumatic  Neck: supple, no lymphadenopathy  CV: Regular rate & regular rhythm  Lungs: decreased BS at bases, no fremitus  Abdomen: Soft, BS present  Ext: Warm, well perfused  Neuro: non focal, awake  Skin: no rash, no erythema  Lines: no phlebitis    TESTS & MEASUREMENTS:                        14.0   16.99 )-----------( 174      ( 13 Jul 2021 07:12 )             41.5     07-13    134<L>  |  99  |  18  ----------------------------<  123<H>  4.0   |  23  |  0.9    Ca    8.3<L>      13 Jul 2021 07:12  Mg     1.5     07-13    TPro  5.2<L>  /  Alb  3.3<L>  /  TBili  1.2  /  DBili  x   /  AST  27  /  ALT  27  /  AlkPhos  86  07-13    eGFR if Non African American: 77 mL/min/1.73M2 (07-13-21 @ 07:12)  eGFR if African American: 89 mL/min/1.73M2 (07-13-21 @ 07:12)    LIVER FUNCTIONS - ( 13 Jul 2021 07:12 )  Alb: 3.3 g/dL / Pro: 5.2 g/dL / ALK PHOS: 86 U/L / ALT: 27 U/L / AST: 27 U/L / GGT: x                 Lactate, Blood: 1.3 mmol/L (07-13-21 @ 07:12)  Lactate, Blood: 2.1 mmol/L (07-12-21 @ 07:21)      INFECTIOUS DISEASES TESTING  COVID-19 PCR: NotDetec (07-12-21 @ 09:38)      RADIOLOGY & ADDITIONAL TESTS:  I have personally reviewed the last Chest xray  CXR      CT  CT Abdomen and Pelvis w/ IV Cont:   EXAM:  CT ABDOMEN AND PELVIS IC            PROCEDURE DATE:  07/12/2021            INTERPRETATION:  CLINICAL STATEMENT: Abdominal pain    TECHNIQUE: Contiguous axial CT images were obtained from the lower chest to the pubic symphysis following administration of 100cc Optiray 320 intravenous contrast.  Oral contrast was not administered.  Reformatted images in the coronal and sagittal planes were acquired.    COMPARISON CT: August 28, 2019 and August 12, 2019    OTHER STUDIES USED FOR CORRELATION: None.      FINDINGS:    LOWER CHEST: Bibasilar subsegmental dependent atelectasis.    HEPATOBILIARY: Unchanged 2.7 cm hepatic segment 3 hemangioma and a 1.3 cm enhancing lesion in the left hepatic lobe segment 2 also likely a hemangioma. Unchanged additional subcentimeter hypodensities in the liver, too small to characterize. Minimal left intrahepatic biliary ductal dilation, unchanged.    SPLEEN: Unremarkable.    PANCREAS: Unremarkable.    ADRENAL GLANDS: Unremarkable.    KIDNEYS: Symmetric renal enhancement bilaterally. No hydronephrosis. Left interpolar renal cyst and additional subcentimeter hypodensities in both kidneys, too small to characterize.    ABDOMINOPELVIC NODES: No lymphadenopathy. Subcentimeter right lower quadrant mesentericlymph nodes noted.    PELVIC ORGANS: Limited evaluation secondary to streak artifact from right hip hardware. Enlarged prostate gland.    PERITONEUM/MESENTERY/BOWEL: Mild circumferential wall thickening of the distal ileum noted with surrounding hyperemia. No bowel obstruction, ascites or pneumoperitoneum. Colonic diverticulosis, without evidence for acute diverticulitis. Appendix not definitively visualized; no focal pericecal inflammation. Small hiatal hernia present.    BONES/SOFT TISSUES: Post right hip arthroplasty. Stable degenerative changes of the spine. Post bilateral inguinal hernia repairs.    OTHER: Diffuse atherosclerotic vascular calcifications.      IMPRESSION:  1.  Mild circumferential distal ileal wall thickening with surrounding hyperemia and subcentimeter adjacent lymph nodes, suggestive of infectious or inflammatory ileitis.    2.  Colonic diverticulosis without evidence for acute diverticulitis.    3.  Prostatomegaly.    --- End of Report ---              JERAD VELASQUEZ; Attending Radiologist  This document has been electronically signed. Jul 12 2021  9:17AM (07-12-21 @ 09:02)      CARDIOLOGY TESTING  12 Lead ECG:   Ventricular Rate 48 BPM    Atrial Rate 48 BPM    P-R Interval 182 ms    QRS Duration 132 ms    Q-T Interval 482 ms    QTC Calculation(Bazett) 430 ms    P Axis 39 degrees    R Axis -74 degrees    T Axis 1 degrees    Diagnosis Line Marked sinus bradycardia  Left axis deviation  Right bundle branch block  Inferior infarct , age undetermined  Anterolateral infarct , age undetermined  Abnormal ECG    Confirmed by MERON SERRANO MD ([id number]) on 7/12/2021 11:30:13 AM (07-12-21 @ 06:50)  12 Lead ECG:   Ventricular Rate 72 BPM    Atrial Rate 72 BPM    P-R Interval 202 ms    QRS Duration 132 ms    Q-T Interval 430 ms    QTC Calculation(Bazett) 470 ms    P Axis 49 degrees    R Axis 269 degrees    T Axis 3 degrees    Diagnosis Line Normal sinus rhythm  Possible Left atrial enlargement  Right bundle branch block  Possible Inferior infarct , age undetermined  Anterolateral infarct , age undetermined  Abnormal ECG    Confirmed by MERON SERRANO MD ([id number]) on 7/4/2021 10:01:41 AM (07-03-21 @ 21:00)      MEDICATIONS  aspirin  chewable 81 Oral daily  atorvastatin 80 Oral at bedtime  ciprofloxacin   IVPB 400 IV Intermittent every 12 hours  diltiazem    Oral daily  donepezil 5 Oral at bedtime  famotidine    Tablet 20 Oral two times a day  heparin   Injectable 5000 SubCutaneous every 12 hours  isosorbide   mononitrate ER Tablet (IMDUR) 30 Oral daily  lisinopril 20 Oral daily  magnesium sulfate  IVPB 1 IV Intermittent once  metroNIDAZOLE  IVPB 500 IV Intermittent every 8 hours  multivitamin 1 Oral daily  nicotine - 21 mG/24Hr(s) Patch 1 Transdermal daily  pantoprazole    Tablet 40 Oral before breakfast  propranolol 20 Oral daily  sodium chloride 0.9%. 1000 IV Continuous <Continuous>  tamsulosin 0.4 Oral at bedtime      Weight  Weight (kg): 66.3 (07-12-21 @ 18:05)    ANTIBIOTICS:  ciprofloxacin   IVPB 400 milliGRAM(s) IV Intermittent every 12 hours  metroNIDAZOLE  IVPB 500 milliGRAM(s) IV Intermittent every 8 hours      ALLERGIES:  No Known Allergies

## 2021-07-13 NOTE — CONSULT NOTE ADULT - SUBJECTIVE AND OBJECTIVE BOX
Chief complaint/Reason for consult:    HPI:  Patient is an 86 year old Male with a past medical history of HTN, HLD, BPH, GERD, S/P hiatal hernia repair presented to the ER with a chief complaint of abdominal pain x 2am. Patient describes constant abrupt abdominal pain, woke him up this am, originates from Left thoracic and radiates to LUQ and LLQ, 9/10, provoked by changing positions, alleviated by pain medications. Chief complaint is associated with nausea without vomiting, 1 episode of nonbloody loose stool. Patient denies fevers, chills, vomiting, chest pain, shortness of breath, palpitations, new onset of / worsening low back pain, constipation, dysuria, new onset of leg swelling, new onset of rashes, abnormal gait. Patient states he had a colonoscopy less than 5 years ago and it was within normal limits, unsure where, when or who did it.  (12 Jul 2021 12:51)    86yMale      PAST MEDICAL & SURGICAL HISTORY:      Family history:  FAMILY HISTORY:  No pertinent family history in first degree relatives      No GI cancers in first or second degree relatives    Social History: No smoking. No alcohol. No illegal drug use.    Allergies:        MEDICATIONS:        MEDICATIONS  (STANDING):  aspirin  chewable 81 milliGRAM(s) Oral daily  atorvastatin 80 milliGRAM(s) Oral at bedtime  ciprofloxacin   IVPB 400 milliGRAM(s) IV Intermittent every 12 hours  diltiazem    milliGRAM(s) Oral daily  donepezil 5 milliGRAM(s) Oral at bedtime  famotidine    Tablet 20 milliGRAM(s) Oral two times a day  heparin   Injectable 5000 Unit(s) SubCutaneous every 12 hours  isosorbide   mononitrate ER Tablet (IMDUR) 30 milliGRAM(s) Oral daily  lisinopril 20 milliGRAM(s) Oral daily  metroNIDAZOLE  IVPB 500 milliGRAM(s) IV Intermittent every 8 hours  multivitamin 1 Tablet(s) Oral daily  nicotine - 21 mG/24Hr(s) Patch 1 patch Transdermal daily  pantoprazole    Tablet 40 milliGRAM(s) Oral before breakfast  propranolol 20 milliGRAM(s) Oral daily  sodium chloride 0.9%. 1000 milliLiter(s) (100 mL/Hr) IV Continuous <Continuous>  tamsulosin 0.4 milliGRAM(s) Oral at bedtime    MEDICATIONS  (PRN):  acetaminophen   Tablet .. 650 milliGRAM(s) Oral every 6 hours PRN Mild Pain (1 - 3)  ketorolac   Injectable 30 milliGRAM(s) IV Push every 6 hours PRN Moderate Pain (4 - 6)  ondansetron Injectable 4 milliGRAM(s) IV Push every 6 hours PRN Nausea        REVIEW OF SYSTEMS  General:  No weight loss, fevers, or chills.  Eyes:  No reported pain or visual changes  ENT:  No sore throat or runny nose.  NECK: No stiffness or lymphadenopathy  CV:  No chest pain or palpitations.  Resp:  No shortness of breath, cough, wheezing or hemoptysis  GI:  No abdominal pain, nausea, vomiting, dysphagia, diarrhea or constipation. No rectal bleeding, melena, or hematemesis.  Muscle:  No aches or weakness  Neuro:  No tingling, numbness       VITALS:   T(F): 99.3 (07-13-21 @ 05:00), Max: 99.3 (07-13-21 @ 05:00)  HR: 69 (07-13-21 @ 05:00) (59 - 77)  BP: 116/58 (07-13-21 @ 05:00) (116/58 - 153/84)  RR: 18 (07-13-21 @ 05:00) (18 - 18)  SpO2: --    PHYSICAL EXAM:  GENERAL: AAOx3, no acute distress.  HEAD:  Atraumatic, Normocephalic  EYES: conjunctiva and sclera clear  NECK: Supple, No thyromegaly   CHEST/LUNG: Clear to auscultation bilaterally; No wheeze, rhonchi, or rales  HEART: Regular rate and rhythm; normal S1, S2, No murmurs.  ABDOMEN: Soft, nontender, nondistended; Bowel sounds present  NEUROLOGY: No asterixis or tremor  SKIN: Intact, no jaundice          LABS:  07-13    134<L>  |  99  |  18  ----------------------------<  123<H>  4.0   |  23  |  0.9    Ca    8.3<L>      13 Jul 2021 07:12  Mg     1.5     07-13    TPro  5.2<L>  /  Alb  3.3<L>  /  TBili  1.2  /  DBili  x   /  AST  27  /  ALT  27  /  AlkPhos  86  07-13                          14.0   16.99 )-----------( 174      ( 13 Jul 2021 07:12 )             41.5     LIVER FUNCTIONS - ( 13 Jul 2021 07:12 )  Alb: 3.3 g/dL / Pro: 5.2 g/dL / ALK PHOS: 86 U/L / ALT: 27 U/L / AST: 27 U/L / GGT: x               IMAGING:         Chief complaint/Reason for consult: ileitis     HPI:  Patient is an 86 year old Male with a past medical history of HTN, HLD, BPH, GERD, S/P hiatal hernia repair presented to the ER with a chief complaint of abdominal pain x 2am. Patient describes constant abrupt abdominal pain, woke him up this am, originates from Left thoracic and radiates to LUQ and LLQ, 9/10, provoked by changing positions, alleviated by pain medications. Chief complaint is associated with nausea without vomiting, 1 episode of nonbloody loose stool. Patient denies fevers, chills, vomiting, chest pain, shortness of breath, palpitations, new onset of / worsening low back pain, constipation, dysuria, new onset of leg swelling, new onset of rashes, abnormal gait. Patient states he had a colonoscopy less than 5 years ago and it was within normal limits, unsure where, when or who did it.  (12 Jul 2021 12:51)    GI Updates: 86yMale [facility] HTN, BPH, GERD s/p hernia repair presents for abdominal pain LUQ and LLQ 9/10. Patient also reported nausea and vomiting and one loose stool. Currently Patient denies nausea, vomiting, hematemesis, melena, blood in stool, diarrhea, constipation, abdominal pain. Patient had a formed bowel movement. Patient feeling 90% better, reports colonoscopy long time ago and normal.      PAST MEDICAL & SURGICAL HISTORY:   Hypertension, unspecified type    High cholesterol    Dyslipidemia    Gastroesophageal reflux disease    Enlarged prostate    H/O hernia repair    History of repair of hip fracture          Family history:  FAMILY HISTORY:  No pertinent family history in first degree relatives      No GI cancers in first or second degree relatives    Social History: No smoking. No alcohol. No illegal drug use.    Allergies:   No Known Allergies              MEDICATIONS: Home Medications:  donepezil 5 mg oral tablet: 1 tab(s) orally once a day (03 Jul 2021 22:55)  famotidine 20 mg oral tablet: 1 tab(s) orally 2 times a day (03 Jul 2021 19:30)  Flomax 0.4 mg oral capsule: 1 cap(s) orally once a day (03 Jul 2021 19:30)  isosorbide:  (03 Jul 2021 19:30)  rosuvastatin 20 mg oral tablet: 1 tab(s) orally once a day (03 Jul 2021 19:30)    MEDICATIONS  (STANDING):  aspirin  chewable 81 milliGRAM(s) Oral daily  atorvastatin 80 milliGRAM(s) Oral at bedtime  ciprofloxacin   IVPB 400 milliGRAM(s) IV Intermittent every 12 hours  diltiazem    milliGRAM(s) Oral daily  donepezil 5 milliGRAM(s) Oral at bedtime  famotidine    Tablet 20 milliGRAM(s) Oral two times a day  heparin   Injectable 5000 Unit(s) SubCutaneous every 12 hours  isosorbide   mononitrate ER Tablet (IMDUR) 30 milliGRAM(s) Oral daily  lisinopril 20 milliGRAM(s) Oral daily  metroNIDAZOLE  IVPB 500 milliGRAM(s) IV Intermittent every 8 hours  multivitamin 1 Tablet(s) Oral daily  nicotine - 21 mG/24Hr(s) Patch 1 patch Transdermal daily  pantoprazole    Tablet 40 milliGRAM(s) Oral before breakfast  propranolol 20 milliGRAM(s) Oral daily  sodium chloride 0.9%. 1000 milliLiter(s) (100 mL/Hr) IV Continuous <Continuous>  tamsulosin 0.4 milliGRAM(s) Oral at bedtime    MEDICATIONS  (PRN):  acetaminophen   Tablet .. 650 milliGRAM(s) Oral every 6 hours PRN Mild Pain (1 - 3)  ketorolac   Injectable 30 milliGRAM(s) IV Push every 6 hours PRN Moderate Pain (4 - 6)  ondansetron Injectable 4 milliGRAM(s) IV Push every 6 hours PRN Nausea        REVIEW OF SYSTEMS  General:  No weight loss, fevers, or chills.  Eyes:  No reported pain or visual changes  ENT:  No sore throat or runny nose.  NECK: No stiffness or lymphadenopathy  CV:  No chest pain or palpitations.  Resp:  No shortness of breath, cough, wheezing or hemoptysis  GI:  No abdominal pain, nausea, vomiting, dysphagia, diarrhea or constipation. No rectal bleeding, melena, or hematemesis.  Muscle:  No aches or weakness  Neuro:  No tingling, numbness       VITALS:   T(F): 99.3 (07-13-21 @ 05:00), Max: 99.3 (07-13-21 @ 05:00)  HR: 69 (07-13-21 @ 05:00) (59 - 77)  BP: 116/58 (07-13-21 @ 05:00) (116/58 - 153/84)  RR: 18 (07-13-21 @ 05:00) (18 - 18)  SpO2: --    PHYSICAL EXAM:  GENERAL: AAOx3, no acute distress.  HEAD:  Atraumatic, Normocephalic  EYES: conjunctiva and sclera clear  NECK: Supple, No thyromegaly   CHEST/LUNG: Clear to auscultation bilaterally; No wheeze, rhonchi, or rales  HEART: Regular rate and rhythm; normal S1, S2, No murmurs.  ABDOMEN: Soft, nontender, nondistended; Bowel sounds present  NEUROLOGY: No asterixis or tremor  SKIN: Intact, no jaundice          LABS:  07-13    134<L>  |  99  |  18  ----------------------------<  123<H>  4.0   |  23  |  0.9    Ca    8.3<L>      13 Jul 2021 07:12  Mg     1.5     07-13    TPro  5.2<L>  /  Alb  3.3<L>  /  TBili  1.2  /  DBili  x   /  AST  27  /  ALT  27  /  AlkPhos  86  07-13                          14.0   16.99 )-----------( 174      ( 13 Jul 2021 07:12 )             41.5     LIVER FUNCTIONS - ( 13 Jul 2021 07:12 )  Alb: 3.3 g/dL / Pro: 5.2 g/dL / ALK PHOS: 86 U/L / ALT: 27 U/L / AST: 27 U/L / GGT: x               IMAGING:  < from: CT Abdomen and Pelvis w/ IV Cont (07.12.21 @ 09:02) >    EXAM:  CT ABDOMEN AND PELVIS IC            PROCEDURE DATE:  07/12/2021            INTERPRETATION:  CLINICAL STATEMENT: Abdominal pain    TECHNIQUE: Contiguous axial CT images were obtained from the lower chest to the pubic symphysis following administration of 100cc Optiray 320 intravenous contrast.  Oral contrast was not administered.  Reformatted images in the coronal and sagittal planes were acquired.    COMPARISON CT: August 28, 2019 and August 12, 2019    OTHER STUDIES USED FOR CORRELATION: None.      FINDINGS:    LOWER CHEST: Bibasilar subsegmental dependent atelectasis.    HEPATOBILIARY: Unchanged 2.7 cm hepatic segment 3 hemangioma and a 1.3 cm enhancing lesion in the left hepatic lobe segment 2 also likely a hemangioma. Unchanged additional subcentimeter hypodensities in the liver, too small to characterize. Minimal left intrahepatic biliary ductal dilation, unchanged.    SPLEEN: Unremarkable.    PANCREAS: Unremarkable.    ADRENAL GLANDS: Unremarkable.    KIDNEYS: Symmetric renal enhancement bilaterally. No hydronephrosis. Left interpolar renal cyst and additional subcentimeter hypodensities in both kidneys, too small to characterize.    ABDOMINOPELVIC NODES: No lymphadenopathy. Subcentimeter right lower quadrant mesentericlymph nodes noted.    PELVIC ORGANS: Limited evaluation secondary to streak artifact from right hip hardware. Enlarged prostate gland.    PERITONEUM/MESENTERY/BOWEL: Mild circumferential wall thickening of the distal ileum noted with surrounding hyperemia. No bowel obstruction, ascites or pneumoperitoneum. Colonic diverticulosis, without evidence for acute diverticulitis. Appendix not definitively visualized; no focal pericecal inflammation. Small hiatal hernia present.    BONES/SOFT TISSUES: Post right hip arthroplasty. Stable degenerative changes of the spine. Post bilateral inguinal hernia repairs.    OTHER: Diffuse atherosclerotic vascular calcifications.      IMPRESSION:  1.  Mild circumferential distal ileal wall thickening with surrounding hyperemia and subcentimeter adjacent lymph nodes, suggestive of infectious or inflammatory ileitis.    2.  Colonic diverticulosis without evidence for acute diverticulitis.    3.  Prostatomegaly.    --- End of Report ---              JERAD VELASQUEZ; Attending Radiologist  This document has been electronically signed. Jul 12 2021  9:17AM    < end of copied text >

## 2021-07-14 LAB
ALBUMIN SERPL ELPH-MCNC: 2.9 G/DL — LOW (ref 3.5–5.2)
ALP SERPL-CCNC: 76 U/L — SIGNIFICANT CHANGE UP (ref 30–115)
ALT FLD-CCNC: 14 U/L — SIGNIFICANT CHANGE UP (ref 0–41)
ANION GAP SERPL CALC-SCNC: 10 MMOL/L — SIGNIFICANT CHANGE UP (ref 7–14)
AST SERPL-CCNC: 14 U/L — SIGNIFICANT CHANGE UP (ref 0–41)
BILIRUB SERPL-MCNC: 0.8 MG/DL — SIGNIFICANT CHANGE UP (ref 0.2–1.2)
BUN SERPL-MCNC: 12 MG/DL — SIGNIFICANT CHANGE UP (ref 10–20)
CALCIUM SERPL-MCNC: 7.9 MG/DL — LOW (ref 8.5–10.1)
CHLORIDE SERPL-SCNC: 100 MMOL/L — SIGNIFICANT CHANGE UP (ref 98–110)
CO2 SERPL-SCNC: 23 MMOL/L — SIGNIFICANT CHANGE UP (ref 17–32)
CREAT SERPL-MCNC: 0.9 MG/DL — SIGNIFICANT CHANGE UP (ref 0.7–1.5)
CULTURE RESULTS: SIGNIFICANT CHANGE UP
GLUCOSE SERPL-MCNC: 106 MG/DL — HIGH (ref 70–99)
HCT VFR BLD CALC: 37.8 % — LOW (ref 42–52)
HGB BLD-MCNC: 12.7 G/DL — LOW (ref 14–18)
MCHC RBC-ENTMCNC: 31.8 PG — HIGH (ref 27–31)
MCHC RBC-ENTMCNC: 33.6 G/DL — SIGNIFICANT CHANGE UP (ref 32–37)
MCV RBC AUTO: 94.7 FL — HIGH (ref 80–94)
NRBC # BLD: 0 /100 WBCS — SIGNIFICANT CHANGE UP (ref 0–0)
PLATELET # BLD AUTO: 153 K/UL — SIGNIFICANT CHANGE UP (ref 130–400)
POTASSIUM SERPL-MCNC: 4.1 MMOL/L — SIGNIFICANT CHANGE UP (ref 3.5–5)
POTASSIUM SERPL-SCNC: 4.1 MMOL/L — SIGNIFICANT CHANGE UP (ref 3.5–5)
PROT SERPL-MCNC: 4.8 G/DL — LOW (ref 6–8)
RBC # BLD: 3.99 M/UL — LOW (ref 4.7–6.1)
RBC # FLD: 12.9 % — SIGNIFICANT CHANGE UP (ref 11.5–14.5)
SODIUM SERPL-SCNC: 133 MMOL/L — LOW (ref 135–146)
SPECIMEN SOURCE: SIGNIFICANT CHANGE UP
WBC # BLD: 17.35 K/UL — HIGH (ref 4.8–10.8)
WBC # FLD AUTO: 17.35 K/UL — HIGH (ref 4.8–10.8)

## 2021-07-14 PROCEDURE — 99233 SBSQ HOSP IP/OBS HIGH 50: CPT

## 2021-07-14 PROCEDURE — 99232 SBSQ HOSP IP/OBS MODERATE 35: CPT

## 2021-07-14 RX ADMIN — HEPARIN SODIUM 5000 UNIT(S): 5000 INJECTION INTRAVENOUS; SUBCUTANEOUS at 05:34

## 2021-07-14 RX ADMIN — TAMSULOSIN HYDROCHLORIDE 0.4 MILLIGRAM(S): 0.4 CAPSULE ORAL at 22:26

## 2021-07-14 RX ADMIN — Medication 81 MILLIGRAM(S): at 11:40

## 2021-07-14 RX ADMIN — Medication 1 PATCH: at 05:42

## 2021-07-14 RX ADMIN — Medication 1 PATCH: at 11:40

## 2021-07-14 RX ADMIN — ATORVASTATIN CALCIUM 80 MILLIGRAM(S): 80 TABLET, FILM COATED ORAL at 22:26

## 2021-07-14 RX ADMIN — FAMOTIDINE 20 MILLIGRAM(S): 10 INJECTION INTRAVENOUS at 17:40

## 2021-07-14 RX ADMIN — SODIUM CHLORIDE 100 MILLILITER(S): 9 INJECTION INTRAMUSCULAR; INTRAVENOUS; SUBCUTANEOUS at 22:25

## 2021-07-14 RX ADMIN — DONEPEZIL HYDROCHLORIDE 5 MILLIGRAM(S): 10 TABLET, FILM COATED ORAL at 22:26

## 2021-07-14 RX ADMIN — Medication 200 MILLIGRAM(S): at 17:40

## 2021-07-14 RX ADMIN — Medication 100 MILLIGRAM(S): at 22:28

## 2021-07-14 RX ADMIN — ISOSORBIDE MONONITRATE 30 MILLIGRAM(S): 60 TABLET, EXTENDED RELEASE ORAL at 11:40

## 2021-07-14 RX ADMIN — Medication 100 MILLIGRAM(S): at 14:42

## 2021-07-14 RX ADMIN — Medication 240 MILLIGRAM(S): at 05:34

## 2021-07-14 RX ADMIN — LISINOPRIL 20 MILLIGRAM(S): 2.5 TABLET ORAL at 05:35

## 2021-07-14 RX ADMIN — FAMOTIDINE 20 MILLIGRAM(S): 10 INJECTION INTRAVENOUS at 05:34

## 2021-07-14 RX ADMIN — Medication 100 MILLIGRAM(S): at 05:34

## 2021-07-14 RX ADMIN — Medication 1 TABLET(S): at 11:40

## 2021-07-14 RX ADMIN — HEPARIN SODIUM 5000 UNIT(S): 5000 INJECTION INTRAVENOUS; SUBCUTANEOUS at 17:40

## 2021-07-14 RX ADMIN — Medication 200 MILLIGRAM(S): at 05:33

## 2021-07-14 RX ADMIN — PANTOPRAZOLE SODIUM 40 MILLIGRAM(S): 20 TABLET, DELAYED RELEASE ORAL at 05:35

## 2021-07-14 NOTE — PROGRESS NOTE ADULT - ASSESSMENT
ASSESSMENT  86 year old Male with a past medical history of HTN, HLD, BPH, GERD, S/P hiatal hernia repair presented to the ER with a chief complaint of abdominal pain     IMPRESSION  Ileitis  - CT Abdomen and Pelvis w/ IV Cont (07.12.21 @ 09:02):  Mild circumferential distal ileal wall thickening with surrounding hyperemia and subcentimeter adjacent lymph nodes, suggestive of infectious or inflammatory ileitis.  Colonic diverticulosis without evidence for acute diverticulitis. Prostatomegaly.  - WBC Count: 16.99 K/uL (07.13.21 @ 07:12)  #GERD   #Hiatal Hernia s/p repair    #Obesity BMI (kg/m2): 24.3, 23.5  #Abx allergy: NKDA    RECOMMENDATIONS  - follow-up GI PCR  - can continue cipro/flagyl for now  - monitor diarrhea   - trend WBC    Please call or message on [employer] Teams if with any questions.  Spectra [phone]

## 2021-07-14 NOTE — PROGRESS NOTE ADULT - SUBJECTIVE AND OBJECTIVE BOX
86yMale  Being followed for   Interval history: Patient denies nausea, vomiting, hematemesis, melena, blood in stool, constipation. patient noticed bout of LLQ pain and one large loose stool today.      PAST MEDICAL & SURGICAL HISTORY:   Hypertension, unspecified type    High cholesterol    Dyslipidemia    Gastroesophageal reflux disease    Enlarged prostate    H/O hernia repair    History of repair of hip fracture            Social History: No smoking. No alcohol. No illegal drug use.            MEDICATIONS  (STANDING):  aspirin  chewable 81 milliGRAM(s) Oral daily  atorvastatin 80 milliGRAM(s) Oral at bedtime  ciprofloxacin   IVPB 400 milliGRAM(s) IV Intermittent every 12 hours  diltiazem    milliGRAM(s) Oral daily  donepezil 5 milliGRAM(s) Oral at bedtime  famotidine    Tablet 20 milliGRAM(s) Oral two times a day  heparin   Injectable 5000 Unit(s) SubCutaneous every 12 hours  isosorbide   mononitrate ER Tablet (IMDUR) 30 milliGRAM(s) Oral daily  lisinopril 20 milliGRAM(s) Oral daily  metroNIDAZOLE  IVPB 500 milliGRAM(s) IV Intermittent every 8 hours  multivitamin 1 Tablet(s) Oral daily  nicotine - 21 mG/24Hr(s) Patch 1 patch Transdermal daily  pantoprazole    Tablet 40 milliGRAM(s) Oral before breakfast  propranolol 20 milliGRAM(s) Oral daily  sodium chloride 0.9%. 1000 milliLiter(s) (100 mL/Hr) IV Continuous <Continuous>  tamsulosin 0.4 milliGRAM(s) Oral at bedtime    MEDICATIONS  (PRN):  acetaminophen   Tablet .. 650 milliGRAM(s) Oral every 6 hours PRN Mild Pain (1 - 3)  ketorolac   Injectable 30 milliGRAM(s) IV Push every 6 hours PRN Moderate Pain (4 - 6)  ondansetron Injectable 4 milliGRAM(s) IV Push every 6 hours PRN Nausea      Allergies:   No Known Allergies               REVIEW OF SYSTEMS:  General:  No weight loss, fevers, or chills.  Eyes:  No reported pain or visual changes  ENT:  No sore throat or runny nose.  NECK: No stiffness   CV:  No chest pain or palpitations.  Resp:  No shortness of breath, cough  GI:  LLQ abdominal pain, No nausea, vomiting, dysphagia, or constipation. No rectal bleeding, melena, or hematemesis.  Neuro:  No tingling, numbness         VITAL SIGNS:   T(F): 97.9 (07-14-21 @ 05:55), Max: 99.6 (07-13-21 @ 21:06)  HR: 85 (07-14-21 @ 05:55) (55 - 85)  BP: 122/59 (07-14-21 @ 05:55) (106/59 - 136/64)  RR: 16 (07-14-21 @ 05:55) (16 - 18)  SpO2: --    PHYSICAL EXAM:  GENERAL: AAOx3, no acute distress.  HEAD:  Atraumatic, Normocephalic  EYES: conjunctiva and sclera clear  NECK: Supple, no JVD or thyromegaly  CHEST/LUNG: Clear to auscultation bilaterally; No wheeze, rhonchi, or rales  HEART: Regular rate and rhythm; normal S1, S2, No murmurs.  ABDOMEN: Soft, nontender, nondistended; Bowel sounds present  NEUROLOGY: No asterixis or tremor.   SKIN: Intact, no jaundice            LABS:                        12.7   17.35 )-----------( 153      ( 14 Jul 2021 06:58 )             37.8     07-14    133<L>  |  100  |  12  ----------------------------<  106<H>  4.1   |  23  |  0.9    Ca    7.9<L>      14 Jul 2021 06:58  Mg     1.5     07-13    TPro  4.8<L>  /  Alb  2.9<L>  /  TBili  0.8  /  DBili  x   /  AST  14  /  ALT  14  /  AlkPhos  76  07-14    LIVER FUNCTIONS - ( 14 Jul 2021 06:58 )  Alb: 2.9 g/dL / Pro: 4.8 g/dL / ALK PHOS: 76 U/L / ALT: 14 U/L / AST: 14 U/L / GGT: x               IMAGING:    < from: CT Abdomen and Pelvis w/ IV Cont (07.12.21 @ 09:02) >    EXAM:  CT ABDOMEN AND PELVIS IC            PROCEDURE DATE:  07/12/2021            INTERPRETATION:  CLINICAL STATEMENT: Abdominal pain    TECHNIQUE: Contiguous axial CT images were obtained from the lower chest to the pubic symphysis following administration of 100cc Optiray 320 intravenous contrast.  Oral contrast was not administered.  Reformatted images in the coronal and sagittal planes were acquired.    COMPARISON CT: August 28, 2019 and August 12, 2019    OTHER STUDIES USED FOR CORRELATION: None.      FINDINGS:    LOWER CHEST: Bibasilar subsegmental dependent atelectasis.    HEPATOBILIARY: Unchanged 2.7 cm hepatic segment 3 hemangioma and a 1.3 cm enhancing lesion in the left hepatic lobe segment 2 also likely a hemangioma. Unchanged additional subcentimeter hypodensities in the liver, too small to characterize. Minimal left intrahepatic biliary ductal dilation, unchanged.    SPLEEN: Unremarkable.    PANCREAS: Unremarkable.    ADRENAL GLANDS: Unremarkable.    KIDNEYS: Symmetric renal enhancement bilaterally. No hydronephrosis. Left interpolar renal cyst and additional subcentimeter hypodensities in both kidneys, too small to characterize.    ABDOMINOPELVIC NODES: No lymphadenopathy. Subcentimeter right lower quadrant mesentericlymph nodes noted.    PELVIC ORGANS: Limited evaluation secondary to streak artifact from right hip hardware. Enlarged prostate gland.    PERITONEUM/MESENTERY/BOWEL: Mild circumferential wall thickening of the distal ileum noted with surrounding hyperemia. No bowel obstruction, ascites or pneumoperitoneum. Colonic diverticulosis, without evidence for acute diverticulitis. Appendix not definitively visualized; no focal pericecal inflammation. Small hiatal hernia present.    BONES/SOFT TISSUES: Post right hip arthroplasty. Stable degenerative changes of the spine. Post bilateral inguinal hernia repairs.    OTHER: Diffuse atherosclerotic vascular calcifications.      IMPRESSION:  1.  Mild circumferential distal ileal wall thickening with surrounding hyperemia and subcentimeter adjacent lymph nodes, suggestive of infectious or inflammatory ileitis.    2.  Colonic diverticulosis without evidence for acute diverticulitis.    3.  Prostatomegaly.    --- End of Report ---              JERAD VELASQUEZ; Attending Radiologist  This document has been electronically signed. Jul 12 2021  9:17AM    < end of copied text >         86yMale  Being followed for ileitis   Interval history: Patient denies nausea, vomiting, hematemesis, melena, blood in stool, constipation. patient noticed bout of LLQ pain and one large loose stool today.      PAST MEDICAL & SURGICAL HISTORY:   Hypertension, unspecified type    High cholesterol    Dyslipidemia    Gastroesophageal reflux disease    Enlarged prostate    H/O hernia repair    History of repair of hip fracture            Social History: No smoking. No alcohol. No illegal drug use.            MEDICATIONS  (STANDING):  aspirin  chewable 81 milliGRAM(s) Oral daily  atorvastatin 80 milliGRAM(s) Oral at bedtime  ciprofloxacin   IVPB 400 milliGRAM(s) IV Intermittent every 12 hours  diltiazem    milliGRAM(s) Oral daily  donepezil 5 milliGRAM(s) Oral at bedtime  famotidine    Tablet 20 milliGRAM(s) Oral two times a day  heparin   Injectable 5000 Unit(s) SubCutaneous every 12 hours  isosorbide   mononitrate ER Tablet (IMDUR) 30 milliGRAM(s) Oral daily  lisinopril 20 milliGRAM(s) Oral daily  metroNIDAZOLE  IVPB 500 milliGRAM(s) IV Intermittent every 8 hours  multivitamin 1 Tablet(s) Oral daily  nicotine - 21 mG/24Hr(s) Patch 1 patch Transdermal daily  pantoprazole    Tablet 40 milliGRAM(s) Oral before breakfast  propranolol 20 milliGRAM(s) Oral daily  sodium chloride 0.9%. 1000 milliLiter(s) (100 mL/Hr) IV Continuous <Continuous>  tamsulosin 0.4 milliGRAM(s) Oral at bedtime    MEDICATIONS  (PRN):  acetaminophen   Tablet .. 650 milliGRAM(s) Oral every 6 hours PRN Mild Pain (1 - 3)  ketorolac   Injectable 30 milliGRAM(s) IV Push every 6 hours PRN Moderate Pain (4 - 6)  ondansetron Injectable 4 milliGRAM(s) IV Push every 6 hours PRN Nausea      Allergies:   No Known Allergies               REVIEW OF SYSTEMS:  General:  No weight loss, fevers, or chills.  Eyes:  No reported pain or visual changes  ENT:  No sore throat or runny nose.  NECK: No stiffness   CV:  No chest pain or palpitations.  Resp:  No shortness of breath, cough  GI:  LLQ abdominal pain, No nausea, vomiting, dysphagia, or constipation. No rectal bleeding, melena, or hematemesis.  Neuro:  No tingling, numbness         VITAL SIGNS:   T(F): 97.9 (07-14-21 @ 05:55), Max: 99.6 (07-13-21 @ 21:06)  HR: 85 (07-14-21 @ 05:55) (55 - 85)  BP: 122/59 (07-14-21 @ 05:55) (106/59 - 136/64)  RR: 16 (07-14-21 @ 05:55) (16 - 18)  SpO2: --    PHYSICAL EXAM:  GENERAL: AAOx3, no acute distress.  HEAD:  Atraumatic, Normocephalic  EYES: conjunctiva and sclera clear  NECK: Supple, no JVD or thyromegaly  CHEST/LUNG: Clear to auscultation bilaterally; No wheeze, rhonchi, or rales  HEART: Regular rate and rhythm; normal S1, S2, No murmurs.  ABDOMEN: Soft, nontender, nondistended; Bowel sounds present  NEUROLOGY: No asterixis or tremor.   SKIN: Intact, no jaundice            LABS:                        12.7   17.35 )-----------( 153      ( 14 Jul 2021 06:58 )             37.8     07-14    133<L>  |  100  |  12  ----------------------------<  106<H>  4.1   |  23  |  0.9    Ca    7.9<L>      14 Jul 2021 06:58  Mg     1.5     07-13    TPro  4.8<L>  /  Alb  2.9<L>  /  TBili  0.8  /  DBili  x   /  AST  14  /  ALT  14  /  AlkPhos  76  07-14    LIVER FUNCTIONS - ( 14 Jul 2021 06:58 )  Alb: 2.9 g/dL / Pro: 4.8 g/dL / ALK PHOS: 76 U/L / ALT: 14 U/L / AST: 14 U/L / GGT: x               IMAGING:    < from: CT Abdomen and Pelvis w/ IV Cont (07.12.21 @ 09:02) >    EXAM:  CT ABDOMEN AND PELVIS IC            PROCEDURE DATE:  07/12/2021            INTERPRETATION:  CLINICAL STATEMENT: Abdominal pain    TECHNIQUE: Contiguous axial CT images were obtained from the lower chest to the pubic symphysis following administration of 100cc Optiray 320 intravenous contrast.  Oral contrast was not administered.  Reformatted images in the coronal and sagittal planes were acquired.    COMPARISON CT: August 28, 2019 and August 12, 2019    OTHER STUDIES USED FOR CORRELATION: None.      FINDINGS:    LOWER CHEST: Bibasilar subsegmental dependent atelectasis.    HEPATOBILIARY: Unchanged 2.7 cm hepatic segment 3 hemangioma and a 1.3 cm enhancing lesion in the left hepatic lobe segment 2 also likely a hemangioma. Unchanged additional subcentimeter hypodensities in the liver, too small to characterize. Minimal left intrahepatic biliary ductal dilation, unchanged.    SPLEEN: Unremarkable.    PANCREAS: Unremarkable.    ADRENAL GLANDS: Unremarkable.    KIDNEYS: Symmetric renal enhancement bilaterally. No hydronephrosis. Left interpolar renal cyst and additional subcentimeter hypodensities in both kidneys, too small to characterize.    ABDOMINOPELVIC NODES: No lymphadenopathy. Subcentimeter right lower quadrant mesentericlymph nodes noted.    PELVIC ORGANS: Limited evaluation secondary to streak artifact from right hip hardware. Enlarged prostate gland.    PERITONEUM/MESENTERY/BOWEL: Mild circumferential wall thickening of the distal ileum noted with surrounding hyperemia. No bowel obstruction, ascites or pneumoperitoneum. Colonic diverticulosis, without evidence for acute diverticulitis. Appendix not definitively visualized; no focal pericecal inflammation. Small hiatal hernia present.    BONES/SOFT TISSUES: Post right hip arthroplasty. Stable degenerative changes of the spine. Post bilateral inguinal hernia repairs.    OTHER: Diffuse atherosclerotic vascular calcifications.      IMPRESSION:  1.  Mild circumferential distal ileal wall thickening with surrounding hyperemia and subcentimeter adjacent lymph nodes, suggestive of infectious or inflammatory ileitis.    2.  Colonic diverticulosis without evidence for acute diverticulitis.    3.  Prostatomegaly.    --- End of Report ---              JERAD VELASQUEZ; Attending Radiologist  This document has been electronically signed. Jul 12 2021  9:17AM    < end of copied text >

## 2021-07-14 NOTE — PROGRESS NOTE ADULT - SUBJECTIVE AND OBJECTIVE BOX
TOSHIAJOSEPH OCHOA  86y, Male  Allergy: No Known Allergies      LOS  2d    CHIEF COMPLAINT: Ileitis (14 Jul 2021 11:45)      INTERVAL EVENTS/HPI  - No acute events overnight  - T(F): , Max: 99.6 (07-13-21 @ 21:06)  - abdominal pain present, but improving - feels less fatigued   - WBC Count: 17.35 (07-14-21 @ 06:58)  WBC Count: 16.99 (07-13-21 @ 07:12)     - Creatinine, Serum: 0.9 (07-14-21 @ 06:58)  Creatinine, Serum: 0.9 (07-13-21 @ 07:12)       ROS  General: Denies rigors, nightsweats  HEENT: Denies headache, rhinorrhea, sore throat, eye pain  CV: Denies CP, palpitations  PULM: Denies wheezing, hemoptysis  GI: Denies hematemesis, hematochezia, melena  : Denies discharge, hematuria  MSK: Denies arthralgias, myalgias  SKIN: Denies rash, lesions  NEURO: Denies paresthesias, weakness  PSYCH: Denies depression, anxiety    VITALS:  T(F): 96.5, Max: 99.6 (07-13-21 @ 21:06)  HR: 70  BP: 113/56  RR: 16Vital Signs Last 24 Hrs  T(C): 35.8 (14 Jul 2021 14:06), Max: 37.6 (13 Jul 2021 21:06)  T(F): 96.5 (14 Jul 2021 14:06), Max: 99.6 (13 Jul 2021 21:06)  HR: 70 (14 Jul 2021 14:06) (55 - 85)  BP: 113/56 (14 Jul 2021 14:06) (113/56 - 136/64)  BP(mean): --  RR: 16 (14 Jul 2021 14:06) (16 - 18)  SpO2: --    PHYSICAL EXAM:  Gen: NAD, resting in bed  HEENT: Normocephalic, atraumatic  Neck: supple, no lymphadenopathy  CV: Regular rate & regular rhythm  Lungs: decreased BS at bases, no fremitus  Abdomen: Soft, BS present  Ext: Warm, well perfused  Neuro: non focal, awake  Skin: no rash, no erythema  Lines: no phlebitis    FH: Non-contributory  Social Hx: Non-contributory    TESTS & MEASUREMENTS:                        12.7   17.35 )-----------( 153      ( 14 Jul 2021 06:58 )             37.8     07-14    133<L>  |  100  |  12  ----------------------------<  106<H>  4.1   |  23  |  0.9    Ca    7.9<L>      14 Jul 2021 06:58  Mg     1.5     07-13    TPro  4.8<L>  /  Alb  2.9<L>  /  TBili  0.8  /  DBili  x   /  AST  14  /  ALT  14  /  AlkPhos  76  07-14    eGFR if Non African American: 77 mL/min/1.73M2 (07-14-21 @ 06:58)  eGFR if : 89 mL/min/1.73M2 (07-14-21 @ 06:58)    LIVER FUNCTIONS - ( 14 Jul 2021 06:58 )  Alb: 2.9 g/dL / Pro: 4.8 g/dL / ALK PHOS: 76 U/L / ALT: 14 U/L / AST: 14 U/L / GGT: x                 Lactate, Blood: 1.3 mmol/L (07-13-21 @ 07:12)  Lactate, Blood: 2.1 mmol/L (07-12-21 @ 07:21)      INFECTIOUS DISEASES TESTING  COVID-19 PCR: NotDetec (07-12-21 @ 09:38)  Rapid RVP Result: NotDetec (07-03-21 @ 19:15)  Rapid RVP Result: NotDetec (10-11-20 @ 15:44)      INFLAMMATORY MARKERS  Sedimentation Rate, Erythrocyte: 4 mm/Hr (07-13-21 @ 07:12)  C-Reactive Protein, Serum: 83 mg/L (07-13-21 @ 07:12)      RADIOLOGY & ADDITIONAL TESTS:  I have personally reviewed the last available Chest xray  CXR      CT  CT Abdomen and Pelvis w/ IV Cont:   EXAM:  CT ABDOMEN AND PELVIS IC            PROCEDURE DATE:  07/12/2021            INTERPRETATION:  CLINICAL STATEMENT: Abdominal pain    TECHNIQUE: Contiguous axial CT images were obtained from the lower chest to the pubic symphysis following administration of 100cc Optiray 320 intravenous contrast.  Oral contrast was not administered.  Reformatted images in the coronal and sagittal planes were acquired.    COMPARISON CT: August 28, 2019 and August 12, 2019    OTHER STUDIES USED FOR CORRELATION: None.      FINDINGS:    LOWER CHEST: Bibasilar subsegmental dependent atelectasis.    HEPATOBILIARY: Unchanged 2.7 cm hepatic segment 3 hemangioma and a 1.3 cm enhancing lesion in the left hepatic lobe segment 2 also likely a hemangioma. Unchanged additional subcentimeter hypodensities in the liver, too small to characterize. Minimal left intrahepatic biliary ductal dilation, unchanged.    SPLEEN: Unremarkable.    PANCREAS: Unremarkable.    ADRENAL GLANDS: Unremarkable.    KIDNEYS: Symmetric renal enhancement bilaterally. No hydronephrosis. Left interpolar renal cyst and additional subcentimeter hypodensities in both kidneys, too small to characterize.    ABDOMINOPELVIC NODES: No lymphadenopathy. Subcentimeter right lower quadrant mesentericlymph nodes noted.    PELVIC ORGANS: Limited evaluation secondary to streak artifact from right hip hardware. Enlarged prostate gland.    PERITONEUM/MESENTERY/BOWEL: Mild circumferential wall thickening of the distal ileum noted with surrounding hyperemia. No bowel obstruction, ascites or pneumoperitoneum. Colonic diverticulosis, without evidence for acute diverticulitis. Appendix not definitively visualized; no focal pericecal inflammation. Small hiatal hernia present.    BONES/SOFT TISSUES: Post right hip arthroplasty. Stable degenerative changes of the spine. Post bilateral inguinal hernia repairs.    OTHER: Diffuse atherosclerotic vascular calcifications.      IMPRESSION:  1.  Mild circumferential distal ileal wall thickening with surrounding hyperemia and subcentimeter adjacent lymph nodes, suggestive of infectious or inflammatory ileitis.    2.  Colonic diverticulosis without evidence for acute diverticulitis.    3.  Prostatomegaly.    --- End of Report ---              JERAD VELASQUEZ; Attending Radiologist  This document has been electronically signed. Jul 12 2021  9:17AM (07-12-21 @ 09:02)      CARDIOLOGY TESTING  12 Lead ECG:   Ventricular Rate 48 BPM    Atrial Rate 48 BPM    P-R Interval 182 ms    QRS Duration 132 ms    Q-T Interval 482 ms    QTC Calculation(Bazett) 430 ms    P Axis 39 degrees    R Axis -74 degrees    T Axis 1 degrees    Diagnosis Line Marked sinus bradycardia  Left axis deviation  Right bundle branch block  Inferior infarct , age undetermined  Anterolateral infarct , age undetermined  Abnormal ECG    Confirmed by MERON SERRANO MD ([id number]) on 7/12/2021 11:30:13 AM (07-12-21 @ 06:50)  12 Lead ECG:   Ventricular Rate 72 BPM    Atrial Rate 72 BPM    P-R Interval 202 ms    QRS Duration 132 ms    Q-T Interval 430 ms    QTC Calculation(Bazett) 470 ms    P Axis 49 degrees    R Axis 269 degrees    T Axis 3 degrees    Diagnosis Line Normal sinus rhythm  Possible Left atrial enlargement  Right bundle branch block  Possible Inferior infarct , age undetermined  Anterolateral infarct , age undetermined  Abnormal ECG    Confirmed by MERON SERRANO MD ([id number]) on 7/4/2021 10:01:41 AM (07-03-21 @ 21:00)      MEDICATIONS  aspirin  chewable 81 Oral daily  atorvastatin 80 Oral at bedtime  ciprofloxacin   IVPB 400 IV Intermittent every 12 hours  diltiazem    Oral daily  donepezil 5 Oral at bedtime  famotidine    Tablet 20 Oral two times a day  heparin   Injectable 5000 SubCutaneous every 12 hours  isosorbide   mononitrate ER Tablet (IMDUR) 30 Oral daily  lisinopril 20 Oral daily  metroNIDAZOLE  IVPB 500 IV Intermittent every 8 hours  multivitamin 1 Oral daily  nicotine - 21 mG/24Hr(s) Patch 1 Transdermal daily  pantoprazole    Tablet 40 Oral before breakfast  propranolol 20 Oral daily  sodium chloride 0.9%. 1000 IV Continuous <Continuous>  tamsulosin 0.4 Oral at bedtime      WEIGHT  Weight (kg): 66.3 (07-12-21 @ 18:05)  Creatinine, Serum: 0.9 mg/dL (07-14-21 @ 06:58)      ANTIBIOTICS:  ciprofloxacin   IVPB 400 milliGRAM(s) IV Intermittent every 12 hours  metroNIDAZOLE  IVPB 500 milliGRAM(s) IV Intermittent every 8 hours      All available historical records have been reviewed

## 2021-07-14 NOTE — PROGRESS NOTE ADULT - ASSESSMENT
Patient is an 86 year old Male with a past medical history of HTN, HLD, BPH, GERD, S/P hiatal hernia repair presented to the ER with a chief complaint of abdominal pain x 2am. Patient is being admitted to medicine for ileitis.     # Ileitis-   # Leukocytosis- worsening  # Nausea without vomiting- improved  # Lactic acidosis- improved with IVF  - Intravenous Antibiotics - Cipro flagyl  - Intravenous Fluids   - Serial abdominal exams-will get ab XR  -Will send C diff as now with diarrhea  - GI Consult appreciated   - ID Consult appreciated  - Zofran PRN     # HTN    - Monitor VS   - DASH diet   - Continue with home medications - Propranolol, Diltiazem, Lisinopril and Imdur     # HLD    - DASH diet   - Rosuvastatin is nonformulary, will give equivalent atorvastatin     # GERD   - Continue with Famotidine and Protonix     # BPH   - Continue with Flomax     # Nicotine abuse, quitted 1 month ago   - Patient wants a nicotine patch

## 2021-07-14 NOTE — PROGRESS NOTE ADULT - TIME BILLING
Direct patient care.
I have personally seen and examined this patient.    I have reviewed all pertinent clinical information and reviewed all relevant imaging and diagnostic studies personally.   I counseled the patient about diagnostic testing and treatment plan. All questions were answered.   I discussed recommendations with the primary team.
coordination of care

## 2021-07-14 NOTE — PROGRESS NOTE ADULT - SUBJECTIVE AND OBJECTIVE BOX
Patient is an 86 year old Male with a past medical history of HTN, HLD, BPH, GERD, S/P hiatal hernia repair presented to the ER with a chief complaint of abdominal pain x 2am. Patient is being admitted to medicine for ileitis.     Today:  Patient states he still does not feel well, had diarrhea today, several episodes.            REVIEW OF SYSTEMS:  +Abdominal pain, diarrhea      MEDICATIONS  (STANDING):  aspirin  chewable 81 milliGRAM(s) Oral daily  atorvastatin 80 milliGRAM(s) Oral at bedtime  ciprofloxacin   IVPB 400 milliGRAM(s) IV Intermittent every 12 hours  diltiazem    milliGRAM(s) Oral daily  donepezil 5 milliGRAM(s) Oral at bedtime  famotidine    Tablet 20 milliGRAM(s) Oral two times a day  heparin   Injectable 5000 Unit(s) SubCutaneous every 12 hours  isosorbide   mononitrate ER Tablet (IMDUR) 30 milliGRAM(s) Oral daily  lisinopril 20 milliGRAM(s) Oral daily  metroNIDAZOLE  IVPB 500 milliGRAM(s) IV Intermittent every 8 hours  multivitamin 1 Tablet(s) Oral daily  nicotine - 21 mG/24Hr(s) Patch 1 patch Transdermal daily  pantoprazole    Tablet 40 milliGRAM(s) Oral before breakfast  propranolol 20 milliGRAM(s) Oral daily  sodium chloride 0.9%. 1000 milliLiter(s) (100 mL/Hr) IV Continuous <Continuous>  tamsulosin 0.4 milliGRAM(s) Oral at bedtime    MEDICATIONS  (PRN):  acetaminophen   Tablet .. 650 milliGRAM(s) Oral every 6 hours PRN Mild Pain (1 - 3)  ketorolac   Injectable 30 milliGRAM(s) IV Push every 6 hours PRN Moderate Pain (4 - 6)  ondansetron Injectable 4 milliGRAM(s) IV Push every 6 hours PRN Nausea      Allergies  No Known Allergies        FAMILY HISTORY:  No pertinent family history in first degree relatives        Vital Signs Last 24 Hrs  T(C): 35.8 (14 Jul 2021 14:06), Max: 37.6 (13 Jul 2021 21:06)  T(F): 96.5 (14 Jul 2021 14:06), Max: 99.6 (13 Jul 2021 21:06)  HR: 70 (14 Jul 2021 14:06) (55 - 85)  BP: 113/56 (14 Jul 2021 14:06) (113/56 - 136/64)  RR: 16 (14 Jul 2021 14:06) (16 - 18)      PHYSICAL EXAM:  Gen: NAD, pleasant  HEENT: AT, NC, EOMI, MMM  Resp: CTAb/l, no w/r/r  CVS: RRR, no m/r/g, no LE edema  Abd: soft, slightly distended, mild tenderness in left abd, no guarding no rigidity, +BS  Neuro: AAOX3, no focal deficit  Psych: appropiate mood, normal affect      LABS:                        12.7   17.35 )-----------( 153      ( 14 Jul 2021 06:58 )             37.8     07-14    133<L>  |  100  |  12  ----------------------------<  106<H>  4.1   |  23  |  0.9    Ca    7.9<L>      14 Jul 2021 06:58  Mg     1.5     07-13    TPro  4.8<L>  /  Alb  2.9<L>  /  TBili  0.8  /  DBili  x   /  AST  14  /  ALT  14  /  AlkPhos  76  07-14

## 2021-07-14 NOTE — PROGRESS NOTE ADULT - ASSESSMENT
86yMale pmh HTN, BPH, GERD s/p hernia repair presents for abdominal pain LUQ and LLQ 9/10. Patient also reported nausea and vomiting and one loose stool. Patient reports last colonoscopy two years ago with Dr. Arechiga was normal     Problem 1-Mild circumferential distal ileal wall thickening with surrounding hyperemia and subcentimeter adjacent lymph nodes, suggestive of infectious or inflammatory ileitis.  rec  -Continue abx  -try soft diet today, lactose free low residue  -check C-diff and GI PCR, patient had diarrhea today   -Consider outpatient colonoscopy with TI intubation and possible MR enterography as an outpatient   - Follow up with our [facility] located at [address] [place] [address]. Phone Number: [phone] or patient's private GI Dr. Arechiga    Problem 2-Prostatomegaly  Rec  - Care as per primary team

## 2021-07-15 ENCOUNTER — TRANSCRIPTION ENCOUNTER (OUTPATIENT)
Age: 86
End: 2021-07-15

## 2021-07-15 VITALS
SYSTOLIC BLOOD PRESSURE: 130 MMHG | TEMPERATURE: 99 F | RESPIRATION RATE: 16 BRPM | HEART RATE: 81 BPM | DIASTOLIC BLOOD PRESSURE: 61 MMHG

## 2021-07-15 LAB
ALBUMIN SERPL ELPH-MCNC: 2.8 G/DL — LOW (ref 3.5–5.2)
ALP SERPL-CCNC: 83 U/L — SIGNIFICANT CHANGE UP (ref 30–115)
ALT FLD-CCNC: 11 U/L — SIGNIFICANT CHANGE UP (ref 0–41)
ANION GAP SERPL CALC-SCNC: 9 MMOL/L — SIGNIFICANT CHANGE UP (ref 7–14)
AST SERPL-CCNC: 12 U/L — SIGNIFICANT CHANGE UP (ref 0–41)
BASOPHILS # BLD AUTO: 0.04 K/UL — SIGNIFICANT CHANGE UP (ref 0–0.2)
BASOPHILS NFR BLD AUTO: 0.2 % — SIGNIFICANT CHANGE UP (ref 0–1)
BILIRUB SERPL-MCNC: 0.6 MG/DL — SIGNIFICANT CHANGE UP (ref 0.2–1.2)
BUN SERPL-MCNC: 15 MG/DL — SIGNIFICANT CHANGE UP (ref 10–20)
CALCIUM SERPL-MCNC: 8.1 MG/DL — LOW (ref 8.5–10.1)
CHLORIDE SERPL-SCNC: 103 MMOL/L — SIGNIFICANT CHANGE UP (ref 98–110)
CO2 SERPL-SCNC: 22 MMOL/L — SIGNIFICANT CHANGE UP (ref 17–32)
CREAT SERPL-MCNC: 0.9 MG/DL — SIGNIFICANT CHANGE UP (ref 0.7–1.5)
EOSINOPHIL # BLD AUTO: 0.02 K/UL — SIGNIFICANT CHANGE UP (ref 0–0.7)
EOSINOPHIL NFR BLD AUTO: 0.1 % — SIGNIFICANT CHANGE UP (ref 0–8)
GLUCOSE SERPL-MCNC: 160 MG/DL — HIGH (ref 70–99)
HCT VFR BLD CALC: 37.6 % — LOW (ref 42–52)
HGB BLD-MCNC: 12.7 G/DL — LOW (ref 14–18)
IMM GRANULOCYTES NFR BLD AUTO: 1.8 % — HIGH (ref 0.1–0.3)
LYMPHOCYTES # BLD AUTO: 0.57 K/UL — LOW (ref 1.2–3.4)
LYMPHOCYTES # BLD AUTO: 3 % — LOW (ref 20.5–51.1)
MAGNESIUM SERPL-MCNC: 1.7 MG/DL — LOW (ref 1.8–2.4)
MCHC RBC-ENTMCNC: 31.6 PG — HIGH (ref 27–31)
MCHC RBC-ENTMCNC: 33.8 G/DL — SIGNIFICANT CHANGE UP (ref 32–37)
MCV RBC AUTO: 93.5 FL — SIGNIFICANT CHANGE UP (ref 80–94)
MONOCYTES # BLD AUTO: 1.5 K/UL — HIGH (ref 0.1–0.6)
MONOCYTES NFR BLD AUTO: 7.9 % — SIGNIFICANT CHANGE UP (ref 1.7–9.3)
NEUTROPHILS # BLD AUTO: 16.55 K/UL — HIGH (ref 1.4–6.5)
NEUTROPHILS NFR BLD AUTO: 87 % — HIGH (ref 42.2–75.2)
NRBC # BLD: 0 /100 WBCS — SIGNIFICANT CHANGE UP (ref 0–0)
PHOSPHATE SERPL-MCNC: 2.3 MG/DL — SIGNIFICANT CHANGE UP (ref 2.1–4.9)
PLATELET # BLD AUTO: 168 K/UL — SIGNIFICANT CHANGE UP (ref 130–400)
POTASSIUM SERPL-MCNC: 3.4 MMOL/L — LOW (ref 3.5–5)
POTASSIUM SERPL-SCNC: 3.4 MMOL/L — LOW (ref 3.5–5)
PROT SERPL-MCNC: 4.9 G/DL — LOW (ref 6–8)
RBC # BLD: 4.02 M/UL — LOW (ref 4.7–6.1)
RBC # FLD: 12.9 % — SIGNIFICANT CHANGE UP (ref 11.5–14.5)
SODIUM SERPL-SCNC: 134 MMOL/L — LOW (ref 135–146)
WBC # BLD: 19.02 K/UL — HIGH (ref 4.8–10.8)
WBC # FLD AUTO: 19.02 K/UL — HIGH (ref 4.8–10.8)

## 2021-07-15 PROCEDURE — 99232 SBSQ HOSP IP/OBS MODERATE 35: CPT

## 2021-07-15 PROCEDURE — 99239 HOSP IP/OBS DSCHRG MGMT >30: CPT

## 2021-07-15 PROCEDURE — 74018 RADEX ABDOMEN 1 VIEW: CPT | Mod: 26

## 2021-07-15 RX ORDER — METRONIDAZOLE 500 MG
1 TABLET ORAL
Qty: 18 | Refills: 0
Start: 2021-07-15 | End: 2021-07-20

## 2021-07-15 RX ORDER — MOXIFLOXACIN HYDROCHLORIDE TABLETS, 400 MG 400 MG/1
1 TABLET, FILM COATED ORAL
Qty: 12 | Refills: 0
Start: 2021-07-15 | End: 2021-07-20

## 2021-07-15 RX ADMIN — LISINOPRIL 20 MILLIGRAM(S): 2.5 TABLET ORAL at 05:51

## 2021-07-15 RX ADMIN — PANTOPRAZOLE SODIUM 40 MILLIGRAM(S): 20 TABLET, DELAYED RELEASE ORAL at 05:51

## 2021-07-15 RX ADMIN — HEPARIN SODIUM 5000 UNIT(S): 5000 INJECTION INTRAVENOUS; SUBCUTANEOUS at 05:52

## 2021-07-15 RX ADMIN — Medication 100 MILLIGRAM(S): at 05:46

## 2021-07-15 RX ADMIN — Medication 1 PATCH: at 06:02

## 2021-07-15 RX ADMIN — FAMOTIDINE 20 MILLIGRAM(S): 10 INJECTION INTRAVENOUS at 05:52

## 2021-07-15 RX ADMIN — SODIUM CHLORIDE 100 MILLILITER(S): 9 INJECTION INTRAMUSCULAR; INTRAVENOUS; SUBCUTANEOUS at 05:47

## 2021-07-15 RX ADMIN — Medication 200 MILLIGRAM(S): at 05:47

## 2021-07-15 RX ADMIN — Medication 240 MILLIGRAM(S): at 05:51

## 2021-07-15 NOTE — DISCHARGE NOTE NURSING/CASE MANAGEMENT/SOCIAL WORK - PATIENT PORTAL LINK FT
You can access the FollowMyHealth Patient Portal offered by [facility] by registering at the following website: [email or web address]. By joining [employer]’s FollowMyHealth portal, you will also be able to view your health information using other applications (apps) compatible with our system.

## 2021-07-15 NOTE — DISCHARGE NOTE PROVIDER - NSDCCPCAREPLAN_GEN_ALL_CORE_FT
PRINCIPAL DISCHARGE DIAGNOSIS  Diagnosis: Ileitis  Assessment and Plan of Treatment: You were admitted due to an infection of your GI tract.  Please complete antibiotics (cipro and Flagyl) as prescribed and follow with your PMD within 1 week.  Please return to hospital if symptoms worsen.

## 2021-07-15 NOTE — PROGRESS NOTE ADULT - ASSESSMENT
86yMale pmh HTN, BPH, GERD s/p hernia repair presents for abdominal pain LUQ and LLQ 9/10. Patient also reported nausea and vomiting and one loose stool. Patient reports last colonoscopy two years ago with Dr. Arechiga was normal     Problem 1-Mild circumferential distal ileal wall thickening with surrounding hyperemia and subcentimeter adjacent lymph nodes, suggestive of infectious or inflammatory ileitis.  rec  -GI PCR negative, WBC rising c-diff ordered yesterday and patient having diarrhea but no sample sent down to lab, CHECK C-DIFF  -Continue abx  -soft diet today, lactose free low residue  -Consider outpatient colonoscopy with TI intubation and possible MR enterography as an outpatient   - Follow up with our [facility] located at [address] [place] [address]. Phone Number: [phone] or patient's private GI Dr. Arechiga    Problem 2-Prostatomegaly  Rec  - Care as per primary team    86yMale pmh HTN, BPH, GERD s/p hernia repair presents for abdominal pain LUQ and LLQ 9/10. Patient also reported nausea and vomiting and one loose stool. Patient reports last colonoscopy two years ago with Dr. Arechiga was normal     Problem 1-Mild circumferential distal ileal wall thickening with surrounding hyperemia and subcentimeter adjacent lymph nodes, suggestive of infectious or inflammatory ileitis.  rec  -GI PCR negative, patient without diarrhea if diarrhea returns, CHECK C-DIFF  -Continue abx  -soft diet today, lactose free low residue  -Consider outpatient colonoscopy with TI intubation and possible MR enterography as an outpatient   - Follow up with our [facility] located at [address] [place] [address]. Phone Number: [phone] or patient's private GI Dr. Arechiga    Problem 2-Prostatomegaly  Rec  - Care as per primary team

## 2021-07-15 NOTE — DISCHARGE NOTE NURSING/CASE MANAGEMENT/SOCIAL WORK - NSDCPEWEB_GEN_ALL_CORE
[facility] for Tobacco Control website --- [email or web address] website --- [email or web address]

## 2021-07-15 NOTE — DISCHARGE NOTE PROVIDER - NSDCMRMEDTOKEN_GEN_ALL_CORE_FT
aspirin 81 mg oral tablet, chewable: 1 tab(s) orally 2 times a day   Cipro 500 mg oral tablet: 1 tab(s) orally 2 times a day   dilTIAZem 240 mg/24 hours oral capsule, extended release: 1 cap(s) orally once a day  donepezil 5 mg oral tablet: 1 tab(s) orally once a day  famotidine 20 mg oral tablet: 1 tab(s) orally 2 times a day  Flagyl 500 mg oral tablet: 1 tab(s) orally 3 times a day   Flomax 0.4 mg oral capsule: 1 cap(s) orally once a day  isosorbide:   lisinopril 20 mg oral tablet: 1 tab(s) orally once a day  pantoprazole 40 mg oral delayed release tablet: 1 tab(s) orally once a day (before a meal)  propranolol 20 mg oral tablet: 1 tab(s) orally once a day  rosuvastatin 20 mg oral tablet: 1 tab(s) orally once a day

## 2021-07-15 NOTE — PROGRESS NOTE ADULT - SUBJECTIVE AND OBJECTIVE BOX
86yMale  Being followed for   Interval history: Patient denies nausea, vomiting, hematemesis, melena, blood in stool, constipation, abdominal pain.      PAST MEDICAL & SURGICAL HISTORY:        Social History: No smoking. No alcohol. No illegal drug use.          MEDICATIONS:  MEDICATIONS  (STANDING):  aspirin  chewable 81 milliGRAM(s) Oral daily  atorvastatin 80 milliGRAM(s) Oral at bedtime  ciprofloxacin   IVPB 400 milliGRAM(s) IV Intermittent every 12 hours  diltiazem    milliGRAM(s) Oral daily  donepezil 5 milliGRAM(s) Oral at bedtime  famotidine    Tablet 20 milliGRAM(s) Oral two times a day  heparin   Injectable 5000 Unit(s) SubCutaneous every 12 hours  isosorbide   mononitrate ER Tablet (IMDUR) 30 milliGRAM(s) Oral daily  lisinopril 20 milliGRAM(s) Oral daily  metroNIDAZOLE  IVPB 500 milliGRAM(s) IV Intermittent every 8 hours  multivitamin 1 Tablet(s) Oral daily  nicotine - 21 mG/24Hr(s) Patch 1 patch Transdermal daily  pantoprazole    Tablet 40 milliGRAM(s) Oral before breakfast  propranolol 20 milliGRAM(s) Oral daily  sodium chloride 0.9%. 1000 milliLiter(s) (100 mL/Hr) IV Continuous <Continuous>  tamsulosin 0.4 milliGRAM(s) Oral at bedtime    MEDICATIONS  (PRN):  acetaminophen   Tablet .. 650 milliGRAM(s) Oral every 6 hours PRN Mild Pain (1 - 3)  ketorolac   Injectable 30 milliGRAM(s) IV Push every 6 hours PRN Moderate Pain (4 - 6)  ondansetron Injectable 4 milliGRAM(s) IV Push every 6 hours PRN Nausea      Allergies:      REVIEW OF SYSTEMS:  General:  No weight loss, fevers, or chills.  Eyes:  No reported pain or visual changes  ENT:  No sore throat or runny nose.  NECK: No stiffness   CV:  No chest pain or palpitations.  Resp:  No shortness of breath, cough  GI:  No abdominal pain, nausea, vomiting, dysphagia, diarrhea or constipation. No rectal bleeding, melena, or hematemesis.  Muscle:  No aches or weakness  Neuro:  No tingling, numbness   Heme:  No ecchymosis or easy bruisability        VITAL SIGNS:   T(F): 99.2 (07-15-21 @ 05:00), Max: 99.2 (07-15-21 @ 05:00)  HR: 81 (07-15-21 @ 05:00) (70 - 81)  BP: 130/61 (07-15-21 @ 05:00) (113/56 - 130/61)  RR: 16 (07-15-21 @ 05:00) (16 - 16)  SpO2: --    PHYSICAL EXAM:  GENERAL: AAOx3, no acute distress.  HEAD:  Atraumatic, Normocephalic  EYES: conjunctiva and sclera clear  NECK: Supple, no JVD or thyromegaly  CHEST/LUNG: Clear to auscultation bilaterally; No wheeze, rhonchi, or rales  HEART: Regular rate and rhythm; normal S1, S2, No murmurs.  ABDOMEN: Soft, nontender, nondistended; Bowel sounds present  NEUROLOGY: No asterixis or tremor.   SKIN: Intact, no jaundice            LABS:                        12.7   19.02 )-----------( 168      ( 15 Jul 2021 08:36 )             37.6     07-15    134<L>  |  103  |  15  ----------------------------<  160<H>  3.4<L>   |  22  |  0.9    Ca    8.1<L>      15 Jul 2021 08:36  Phos  2.3     07-15  Mg     1.7     07-15    TPro  4.9<L>  /  Alb  2.8<L>  /  TBili  0.6  /  DBili  x   /  AST  12  /  ALT  11  /  AlkPhos  83  07-15    LIVER FUNCTIONS - ( 15 Jul 2021 08:36 )  Alb: 2.8 g/dL / Pro: 4.9 g/dL / ALK PHOS: 83 U/L / ALT: 11 U/L / AST: 12 U/L / GGT: x               IMAGING:           86yMale  Being followed for ileitis   Interval history: Patient denies nausea, vomiting, hematemesis, melena, blood in stool, constipation, abdominal pain. Patient with diarrhea       PAST MEDICAL & SURGICAL HISTORY:   Hypertension, unspecified type    High cholesterol    Dyslipidemia    Gastroesophageal reflux disease    Enlarged prostate    H/O hernia repair    History of repair of hip fracture            Social History: No smoking. No alcohol. No illegal drug use.            MEDICATIONS  (STANDING):  aspirin  chewable 81 milliGRAM(s) Oral daily  atorvastatin 80 milliGRAM(s) Oral at bedtime  ciprofloxacin   IVPB 400 milliGRAM(s) IV Intermittent every 12 hours  diltiazem    milliGRAM(s) Oral daily  donepezil 5 milliGRAM(s) Oral at bedtime  famotidine    Tablet 20 milliGRAM(s) Oral two times a day  heparin   Injectable 5000 Unit(s) SubCutaneous every 12 hours  isosorbide   mononitrate ER Tablet (IMDUR) 30 milliGRAM(s) Oral daily  lisinopril 20 milliGRAM(s) Oral daily  metroNIDAZOLE  IVPB 500 milliGRAM(s) IV Intermittent every 8 hours  multivitamin 1 Tablet(s) Oral daily  nicotine - 21 mG/24Hr(s) Patch 1 patch Transdermal daily  pantoprazole    Tablet 40 milliGRAM(s) Oral before breakfast  propranolol 20 milliGRAM(s) Oral daily  sodium chloride 0.9%. 1000 milliLiter(s) (100 mL/Hr) IV Continuous <Continuous>  tamsulosin 0.4 milliGRAM(s) Oral at bedtime    MEDICATIONS  (PRN):  acetaminophen   Tablet .. 650 milliGRAM(s) Oral every 6 hours PRN Mild Pain (1 - 3)  ketorolac   Injectable 30 milliGRAM(s) IV Push every 6 hours PRN Moderate Pain (4 - 6)  ondansetron Injectable 4 milliGRAM(s) IV Push every 6 hours PRN Nausea      Allergies:   No Known Allergies            REVIEW OF SYSTEMS:  General:  No weight loss, fevers, or chills.  Eyes:  No reported pain or visual changes  ENT:  No sore throat or runny nose.  NECK: No stiffness   CV:  No chest pain or palpitations.  Resp:  No shortness of breath, cough  GI:  No abdominal pain, nausea, vomiting, dysphagia, diarrhea or constipation. No rectal bleeding, melena, or hematemesis.  Neuro:  No tingling, numbness       VITAL SIGNS:   T(F): 99.2 (07-15-21 @ 05:00), Max: 99.2 (07-15-21 @ 05:00)  HR: 81 (07-15-21 @ 05:00) (70 - 81)  BP: 130/61 (07-15-21 @ 05:00) (113/56 - 130/61)  RR: 16 (07-15-21 @ 05:00) (16 - 16)  SpO2: --    PHYSICAL EXAM:  GENERAL: AAOx3, no acute distress.  HEAD:  Atraumatic, Normocephalic  EYES: conjunctiva and sclera clear  NECK: Supple, no JVD or thyromegaly  CHEST/LUNG: Clear to auscultation bilaterally; No wheeze, rhonchi, or rales  HEART: Regular rate and rhythm; normal S1, S2, No murmurs.  ABDOMEN: Soft, nontender, nondistended; Bowel sounds present  NEUROLOGY: No asterixis or tremor.   SKIN: Intact, no jaundice            LABS:                        12.7   19.02 )-----------( 168      ( 15 Jul 2021 08:36 )             37.6     07-15    134<L>  |  103  |  15  ----------------------------<  160<H>  3.4<L>   |  22  |  0.9    Ca    8.1<L>      15 Jul 2021 08:36  Phos  2.3     07-15  Mg     1.7     07-15    TPro  4.9<L>  /  Alb  2.8<L>  /  TBili  0.6  /  DBili  x   /  AST  12  /  ALT  11  /  AlkPhos  83  07-15    LIVER FUNCTIONS - ( 15 Jul 2021 08:36 )  Alb: 2.8 g/dL / Pro: 4.9 g/dL / ALK PHOS: 83 U/L / ALT: 11 U/L / AST: 12 U/L / GGT: x               IMAGING:      < from: CT Abdomen and Pelvis w/ IV Cont (07.12.21 @ 09:02) >  EXAM:  CT ABDOMEN AND PELVIS IC            PROCEDURE DATE:  07/12/2021            INTERPRETATION:  CLINICAL STATEMENT: Abdominal pain    TECHNIQUE: Contiguous axial CT images were obtained from the lower chest to the pubic symphysis following administration of 100cc Optiray 320 intravenous contrast.  Oral contrast was not administered.  Reformatted images in the coronal and sagittal planes were acquired.    COMPARISON CT: August 28, 2019 and August 12, 2019    OTHER STUDIES USED FOR CORRELATION: None.      FINDINGS:    LOWER CHEST: Bibasilar subsegmental dependent atelectasis.    HEPATOBILIARY: Unchanged 2.7 cm hepatic segment 3 hemangioma and a 1.3 cm enhancing lesion in the left hepatic lobe segment 2 also likely a hemangioma. Unchanged additional subcentimeter hypodensities in the liver, too small to characterize. Minimal left intrahepatic biliary ductal dilation, unchanged.    SPLEEN: Unremarkable.    PANCREAS: Unremarkable.    ADRENAL GLANDS: Unremarkable.    KIDNEYS: Symmetric renal enhancement bilaterally. No hydronephrosis. Left interpolar renal cyst and additional subcentimeter hypodensities in both kidneys, too small to characterize.    ABDOMINOPELVIC NODES: No lymphadenopathy. Subcentimeter right lower quadrant mesentericlymph nodes noted.    PELVIC ORGANS: Limited evaluation secondary to streak artifact from right hip hardware. Enlarged prostate gland.    PERITONEUM/MESENTERY/BOWEL: Mild circumferential wall thickening of the distal ileum noted with surrounding hyperemia. No bowel obstruction, ascites or pneumoperitoneum. Colonic diverticulosis, without evidence for acute diverticulitis. Appendix not definitively visualized; no focal pericecal inflammation. Small hiatal hernia present.    BONES/SOFT TISSUES: Post right hip arthroplasty. Stable degenerative changes of the spine. Post bilateral inguinal hernia repairs.    OTHER: Diffuse atherosclerotic vascular calcifications.      IMPRESSION:  1.  Mild circumferential distal ileal wall thickening with surrounding hyperemia and subcentimeter adjacent lymph nodes, suggestive of infectious or inflammatory ileitis.    2.  Colonic diverticulosis without evidence for acute diverticulitis.    3.  Prostatomegaly.    --- End of Report ---              JERAD VELASQUEZ; Attending Radiologist  This document has been electronically signed. Jul 12 2021  9:17AM    < end of copied text >

## 2021-07-15 NOTE — DISCHARGE NOTE NURSING/CASE MANAGEMENT/SOCIAL WORK - NSDCPEEMAIL_GEN_ALL_CORE
[facility] email [email or web address]

## 2021-07-22 DIAGNOSIS — E66.9 OBESITY, UNSPECIFIED: ICD-10-CM

## 2021-07-22 DIAGNOSIS — K21.9 GASTRO-ESOPHAGEAL REFLUX DISEASE WITHOUT ESOPHAGITIS: ICD-10-CM

## 2021-07-22 DIAGNOSIS — R07.9 CHEST PAIN, UNSPECIFIED: ICD-10-CM

## 2021-07-22 DIAGNOSIS — Z79.82 LONG TERM (CURRENT) USE OF ASPIRIN: ICD-10-CM

## 2021-07-22 DIAGNOSIS — A09 INFECTIOUS GASTROENTERITIS AND COLITIS, UNSPECIFIED: ICD-10-CM

## 2021-07-22 DIAGNOSIS — E78.5 HYPERLIPIDEMIA, UNSPECIFIED: ICD-10-CM

## 2021-07-22 DIAGNOSIS — K57.30 DIVERTICULOSIS OF LARGE INTESTINE WITHOUT PERFORATION OR ABSCESS WITHOUT BLEEDING: ICD-10-CM

## 2021-07-22 DIAGNOSIS — N40.0 BENIGN PROSTATIC HYPERPLASIA WITHOUT LOWER URINARY TRACT SYMPTOMS: ICD-10-CM

## 2021-07-22 DIAGNOSIS — E87.2 ACIDOSIS: ICD-10-CM

## 2021-07-22 DIAGNOSIS — I10 ESSENTIAL (PRIMARY) HYPERTENSION: ICD-10-CM

## 2021-07-22 DIAGNOSIS — F17.210 NICOTINE DEPENDENCE, CIGARETTES, UNCOMPLICATED: ICD-10-CM

## 2021-07-22 DIAGNOSIS — D72.829 ELEVATED WHITE BLOOD CELL COUNT, UNSPECIFIED: ICD-10-CM

## 2021-08-05 NOTE — PHYSICAL THERAPY INITIAL EVALUATION ADULT - IMPAIRMENTS CONTRIBUTING TO GAIT DEVIATIONS, PT EVAL
Pt not seen for OT at this time secondary to per nursing, pt is not appropriate. Will attempt to see pt next date as pt is appropriate and schedule permits. decreased ROM/decreased strength/pain

## 2021-09-03 ENCOUNTER — APPOINTMENT (RX ONLY)
Dept: URBAN - METROPOLITAN AREA CLINIC 97 | Facility: CLINIC | Age: 86
Setting detail: DERMATOLOGY
End: 2021-09-03

## 2021-09-03 DIAGNOSIS — D49.2 NEOPLASM OF UNSPECIFIED BEHAVIOR OF BONE, SOFT TISSUE, AND SKIN: ICD-10-CM

## 2021-09-03 DIAGNOSIS — R20.2 PARESTHESIA OF SKIN: ICD-10-CM | Status: IMPROVED

## 2021-09-03 DIAGNOSIS — L57.0 ACTINIC KERATOSIS: ICD-10-CM

## 2021-09-03 DIAGNOSIS — L82.1 OTHER SEBORRHEIC KERATOSIS: ICD-10-CM

## 2021-09-03 DIAGNOSIS — D22 MELANOCYTIC NEVI: ICD-10-CM

## 2021-09-03 DIAGNOSIS — D69.2 OTHER NONTHROMBOCYTOPENIC PURPURA: ICD-10-CM

## 2021-09-03 DIAGNOSIS — L81.4 OTHER MELANIN HYPERPIGMENTATION: ICD-10-CM

## 2021-09-03 PROBLEM — D22.5 MELANOCYTIC NEVI OF TRUNK: Status: ACTIVE | Noted: 2021-09-03

## 2021-09-03 PROCEDURE — 11102 TANGNTL BX SKIN SINGLE LES: CPT | Mod: 59

## 2021-09-03 PROCEDURE — ? TREATMENT REGIMEN

## 2021-09-03 PROCEDURE — 17000 DESTRUCT PREMALG LESION: CPT | Mod: 59

## 2021-09-03 PROCEDURE — ? SHAVE REMOVAL

## 2021-09-03 PROCEDURE — ? LIQUID NITROGEN

## 2021-09-03 PROCEDURE — ? BIOPSY BY SHAVE METHOD

## 2021-09-03 PROCEDURE — 99213 OFFICE O/P EST LOW 20 MIN: CPT | Mod: 25

## 2021-09-03 PROCEDURE — ? COUNSELING

## 2021-09-03 PROCEDURE — 11301 SHAVE SKIN LESION 0.6-1.0 CM: CPT

## 2021-09-03 ASSESSMENT — LOCATION ZONE DERM
LOCATION ZONE: ARM
LOCATION ZONE: TRUNK
LOCATION ZONE: TRUNK
LOCATION ZONE: EAR
LOCATION ZONE: FACE

## 2021-09-03 ASSESSMENT — LOCATION SIMPLE DESCRIPTION DERM
LOCATION SIMPLE: CHEST
LOCATION SIMPLE: LEFT UPPER BACK
LOCATION SIMPLE: UPPER BACK
LOCATION SIMPLE: LEFT CHEEK
LOCATION SIMPLE: LEFT FOREARM
LOCATION SIMPLE: RIGHT FOREARM
LOCATION SIMPLE: RIGHT UPPER ARM
LOCATION SIMPLE: LEFT EAR
LOCATION SIMPLE: LEFT FOREHEAD

## 2021-09-03 ASSESSMENT — LOCATION DETAILED DESCRIPTION DERM
LOCATION DETAILED: RIGHT PROXIMAL DORSAL FOREARM
LOCATION DETAILED: LEFT INFERIOR UPPER BACK
LOCATION DETAILED: LEFT MEDIAL INFERIOR CHEST
LOCATION DETAILED: SUPERIOR THORACIC SPINE
LOCATION DETAILED: LEFT INFERIOR CENTRAL MALAR CHEEK
LOCATION DETAILED: RIGHT DISTAL POSTERIOR UPPER ARM
LOCATION DETAILED: LEFT DISTAL DORSAL FOREARM
LOCATION DETAILED: LEFT ANTERIOR EARLOBE
LOCATION DETAILED: LEFT SUPERIOR FOREHEAD
LOCATION DETAILED: RIGHT VENTRAL PROXIMAL FOREARM
LOCATION DETAILED: LEFT FOREHEAD

## 2021-09-03 NOTE — PROCEDURE: LIQUID NITROGEN
Consent: The patient's consent was obtained including but not limited to risks of crusting, scabbing, blistering, scarring, darker or lighter pigmentary change, recurrence, incomplete removal and infection.
Number Of Freeze-Thaw Cycles: 1 freeze-thaw cycle
Duration Of Freeze Thaw-Cycle (Seconds): 3
Render Note In Bullet Format When Appropriate: No
Render Post-Care Instructions In Note?: yes
Post-Care Instructions: I reviewed with the patient in detail post-care instructions. Patient is to wear sunprotection, and avoid picking at any of the treated lesions. Pt may apply Vaseline to crusted or scabbing areas.
Detail Level: Detailed

## 2021-09-03 NOTE — PROCEDURE: TREATMENT REGIMEN
Detail Level: Zone
Continue Regimen: betamethasone dipropionate 0.05 % topical cream -Apply to affected areas on back bid x2 weeks on, 2 weeks off PRN itch

## 2021-09-03 NOTE — PROCEDURE: BIOPSY BY SHAVE METHOD
Detail Level: Detailed
Depth Of Biopsy: dermis
Was A Bandage Applied: Yes
Size Of Lesion In Cm: 0.5
X Size Of Lesion In Cm: 0
Biopsy Type: H and E
Biopsy Method: 15 blade
Anesthesia Type: 2% lidocaine with epinephrine
Hemostasis: Zee's
Wound Care: Petrolatum
Dressing: bandage
Destruction After The Procedure: No
Type Of Destruction Used: Curettage
Electrodesiccation Text: The wound bed was treated with electrodesiccation after the biopsy was performed.
Electrodesiccation And Curettage Text: The wound bed was treated with electrodesiccation and curettage after the biopsy was performed.
Silver Nitrate Text: The wound bed was treated with silver nitrate after the biopsy was performed.
Lab: [facility]
Lab Facility: [address]
Consent: The provider's intent is to obtain a tissue sample solely for diagnostic purposes. Written consent was obtained and risks were reviewed including but not limited to scarring, infection, bleeding, scabbing, incomplete removal, nerve damage and allergy to anesthesia.
Post-Care Instructions: I reviewed with the patient in detail post-care instructions. Patient is to keep the biopsy site dry overnight, and then apply bacitracin twice daily until healed. Patient may apply hydrogen peroxide soaks to remove any crusting.
Notification Instructions: Patient will be notified of biopsy results. However, patient instructed to call the office if not contacted within 2 weeks.
Billing Type: [employer]
Information: Selecting Yes will display possible errors in your note based on the variables you have selected. This validation is only offered as a suggestion for you. PLEASE NOTE THAT THE VALIDATION TEXT WILL BE REMOVED WHEN YOU FINALIZE YOUR NOTE. IF YOU WANT TO FAX A PRELIMINARY NOTE YOU WILL NEED TO TOGGLE THIS TO 'NO' IF YOU DO NOT WANT IT IN YOUR FAXED NOTE.

## 2021-09-03 NOTE — PROCEDURE: SHAVE REMOVAL
Medical Necessity Information: It is in your best interest to select a reason for this procedure from the list below. All of these items fulfill various CMS LCD requirements except the new and changing color options.
Medical Necessity Clause: This procedure was medically necessary because the lesion that was treated was:
Lab: [facility]
Lab Facility: [address]
Detail Level: Detailed
Was A Bandage Applied: Yes
Size Of Lesion In Cm (Required): 0.6
X Size Of Lesion In Cm (Optional): 0
Biopsy Method: Dermablade
Anesthesia Type: 1% lidocaine with epinephrine
Hemostasis: Zee's
Wound Care: Petrolatum
Render Path Notes In Note?: No
Consent was obtained from the patient. The risks and benefits to therapy were discussed in detail. Specifically, the risks of infection, scarring, bleeding, prolonged wound healing, incomplete removal, allergy to anesthesia, nerve injury and recurrence were addressed. Prior to the procedure, the treatment site was clearly identified and confirmed by the patient. All components of Universal Protocol/PAUSE Rule completed.
Post-Care Instructions: I reviewed with the patient in detail post-care instructions. Patient is to keep the biopsy site dry overnight, and then apply bacitracin twice daily until healed. Patient may apply hydrogen peroxide soaks to remove any crusting.
Notification Instructions: Patient will be notified of pathology results. However, patient instructed to call the office if not contacted within 2 weeks.
Billing Type: [employer]

## 2021-11-09 ENCOUNTER — APPOINTMENT (OUTPATIENT)
Age: 86
End: 2021-11-09
Payer: MEDICARE

## 2021-11-09 VITALS
DIASTOLIC BLOOD PRESSURE: 70 MMHG | HEART RATE: 90 BPM | WEIGHT: 142 LBS | OXYGEN SATURATION: 99 % | RESPIRATION RATE: 14 BRPM | SYSTOLIC BLOOD PRESSURE: 122 MMHG | HEIGHT: 63 IN | BODY MASS INDEX: 25.16 KG/M2

## 2021-11-09 PROCEDURE — 99213 OFFICE O/P EST LOW 20 MIN: CPT | Mod: 25

## 2021-11-09 PROCEDURE — 99406 BEHAV CHNG SMOKING 3-10 MIN: CPT

## 2021-11-09 NOTE — REASON FOR VISIT
[Follow-Up] : a follow-up visit [Shortness of Breath] : shortness of breath [Pulmonary Nodules] : pulmonary nodules

## 2021-12-14 ENCOUNTER — NON-APPOINTMENT (OUTPATIENT)
Age: 86
End: 2021-12-14

## 2021-12-23 ENCOUNTER — APPOINTMENT (OUTPATIENT)
Dept: UROLOGY | Facility: CLINIC | Age: 86
End: 2021-12-23
Payer: MEDICARE

## 2021-12-23 VITALS — WEIGHT: 144 LBS | BODY MASS INDEX: 23.14 KG/M2 | HEIGHT: 66 IN

## 2021-12-23 DIAGNOSIS — N40.1 BENIGN PROSTATIC HYPERPLASIA WITH LOWER URINARY TRACT SYMPMS: ICD-10-CM

## 2021-12-23 DIAGNOSIS — N13.8 BENIGN PROSTATIC HYPERPLASIA WITH LOWER URINARY TRACT SYMPMS: ICD-10-CM

## 2021-12-23 PROCEDURE — 99214 OFFICE O/P EST MOD 30 MIN: CPT

## 2021-12-23 PROCEDURE — 51798 US URINE CAPACITY MEASURE: CPT

## 2021-12-23 NOTE — ASSESSMENT
[FreeTextEntry1] : 87 yo with excellent response to flomax\par feels well\par persistent non-bothersome nocturia\par \par PVR 0 cc\par \par - Flomax renewed\par - f/u in 6 months for PVR and re-assessment\par - prior records requested

## 2021-12-23 NOTE — HISTORY OF PRESENT ILLNESS
[FreeTextEntry1] : 85 yo seen 9/2019 for retention associated with UTI\par no repeat bout of infection or retention \par \par PVR 0 cc today\par \par no complains of lower abdominal pain \par has nocturia - not bothersome\par feels flomax has helped him \par \par self reported age appopriate PSA 18 months ago from [facility] - records requested for validation \par \par \par renal and bladder US 8/25/2020\par prostate 74 cc\par post void 27 cc\par \par \par

## 2021-12-31 ENCOUNTER — LABORATORY RESULT (OUTPATIENT)
Age: 86
End: 2021-12-31

## 2022-01-03 ENCOUNTER — OUTPATIENT (OUTPATIENT)
Dept: OUTPATIENT SERVICES | Facility: HOSPITAL | Age: 87
LOS: 1 days | Discharge: HOME | End: 2022-01-03
Payer: MEDICARE

## 2022-01-03 DIAGNOSIS — Z98.890 OTHER SPECIFIED POSTPROCEDURAL STATES: Chronic | ICD-10-CM

## 2022-01-03 DIAGNOSIS — R06.00 DYSPNEA, UNSPECIFIED: ICD-10-CM

## 2022-01-03 PROCEDURE — 94060 EVALUATION OF WHEEZING: CPT | Mod: 26

## 2022-01-03 PROCEDURE — 94729 DIFFUSING CAPACITY: CPT | Mod: 26

## 2022-01-03 PROCEDURE — 94727 GAS DIL/WSHOT DETER LNG VOL: CPT | Mod: 26

## 2022-01-12 ENCOUNTER — APPOINTMENT (OUTPATIENT)
Age: 87
End: 2022-01-12

## 2022-03-22 ENCOUNTER — APPOINTMENT (RX ONLY)
Dept: URBAN - METROPOLITAN AREA CLINIC 97 | Facility: CLINIC | Age: 87
Setting detail: DERMATOLOGY
End: 2022-03-22

## 2022-03-22 DIAGNOSIS — Z02.9 ENCOUNTER FOR ADMINISTRATIVE EXAMINATIONS, UNSPECIFIED: ICD-10-CM

## 2022-03-22 PROCEDURE — ? NO SHOW PLAN

## 2022-04-06 ENCOUNTER — APPOINTMENT (OUTPATIENT)
Dept: PULMONOLOGY | Facility: CLINIC | Age: 87
End: 2022-04-06

## 2022-04-22 ENCOUNTER — OUTPATIENT (OUTPATIENT)
Dept: OUTPATIENT SERVICES | Facility: HOSPITAL | Age: 87
LOS: 1 days | Discharge: HOME | End: 2022-04-22

## 2022-04-22 DIAGNOSIS — Z98.890 OTHER SPECIFIED POSTPROCEDURAL STATES: Chronic | ICD-10-CM

## 2022-04-22 DIAGNOSIS — K02.63 DENTAL CARIES ON SMOOTH SURFACE PENETRATING INTO PULP: ICD-10-CM

## 2022-05-09 ENCOUNTER — OUTPATIENT (OUTPATIENT)
Dept: OUTPATIENT SERVICES | Facility: HOSPITAL | Age: 87
LOS: 1 days | Discharge: HOME | End: 2022-05-09

## 2022-05-09 DIAGNOSIS — Z98.890 OTHER SPECIFIED POSTPROCEDURAL STATES: Chronic | ICD-10-CM

## 2022-06-14 ENCOUNTER — APPOINTMENT (OUTPATIENT)
Age: 87
End: 2022-06-14
Payer: MEDICARE

## 2022-06-14 VITALS
SYSTOLIC BLOOD PRESSURE: 140 MMHG | HEIGHT: 64 IN | WEIGHT: 155 LBS | OXYGEN SATURATION: 96 % | HEART RATE: 57 BPM | DIASTOLIC BLOOD PRESSURE: 70 MMHG | BODY MASS INDEX: 26.46 KG/M2

## 2022-06-14 PROCEDURE — 99214 OFFICE O/P EST MOD 30 MIN: CPT

## 2022-06-14 RX ORDER — FLUTICASONE FUROATE, UMECLIDINIUM BROMIDE AND VILANTEROL TRIFENATATE 100; 62.5; 25 UG/1; UG/1; UG/1
100-62.5-25 POWDER RESPIRATORY (INHALATION)
Qty: 3 | Refills: 3 | Status: ACTIVE | COMMUNITY
Start: 2022-06-14 | End: 1900-01-01

## 2022-07-06 NOTE — DISCHARGE NOTE PROVIDER - EXTENDED VTE YES NO FOR MLM ENOXAPARIN
What Is The Reason For Today's Visit?: History of Non-Melanoma Skin Cancer How Many Skin Cancers Have You Had?: more than one When Was Your Last Cancer Diagnosed?: 12/2021 ,

## 2022-07-17 ENCOUNTER — EMERGENCY (EMERGENCY)
Facility: HOSPITAL | Age: 87
LOS: 0 days | Discharge: HOME | End: 2022-07-17
Attending: EMERGENCY MEDICINE | Admitting: EMERGENCY MEDICINE

## 2022-07-17 VITALS
TEMPERATURE: 97 F | RESPIRATION RATE: 18 BRPM | HEART RATE: 71 BPM | OXYGEN SATURATION: 100 % | DIASTOLIC BLOOD PRESSURE: 59 MMHG | SYSTOLIC BLOOD PRESSURE: 136 MMHG

## 2022-07-17 VITALS
DIASTOLIC BLOOD PRESSURE: 79 MMHG | TEMPERATURE: 99 F | RESPIRATION RATE: 18 BRPM | OXYGEN SATURATION: 97 % | HEIGHT: 65 IN | HEART RATE: 64 BPM | WEIGHT: 139.99 LBS | SYSTOLIC BLOOD PRESSURE: 200 MMHG

## 2022-07-17 DIAGNOSIS — N40.1 BENIGN PROSTATIC HYPERPLASIA WITH LOWER URINARY TRACT SYMPTOMS: ICD-10-CM

## 2022-07-17 DIAGNOSIS — R10.9 UNSPECIFIED ABDOMINAL PAIN: ICD-10-CM

## 2022-07-17 DIAGNOSIS — R35.0 FREQUENCY OF MICTURITION: ICD-10-CM

## 2022-07-17 DIAGNOSIS — R35.89 OTHER POLYURIA: ICD-10-CM

## 2022-07-17 DIAGNOSIS — E78.5 HYPERLIPIDEMIA, UNSPECIFIED: ICD-10-CM

## 2022-07-17 DIAGNOSIS — R19.7 DIARRHEA, UNSPECIFIED: ICD-10-CM

## 2022-07-17 DIAGNOSIS — Z79.82 LONG TERM (CURRENT) USE OF ASPIRIN: ICD-10-CM

## 2022-07-17 DIAGNOSIS — Z98.890 OTHER SPECIFIED POSTPROCEDURAL STATES: Chronic | ICD-10-CM

## 2022-07-17 DIAGNOSIS — K21.9 GASTRO-ESOPHAGEAL REFLUX DISEASE WITHOUT ESOPHAGITIS: ICD-10-CM

## 2022-07-17 DIAGNOSIS — I10 ESSENTIAL (PRIMARY) HYPERTENSION: ICD-10-CM

## 2022-07-17 LAB
ALBUMIN SERPL ELPH-MCNC: 3.6 G/DL — SIGNIFICANT CHANGE UP (ref 3.5–5.2)
ALP SERPL-CCNC: 86 U/L — SIGNIFICANT CHANGE UP (ref 30–115)
ALT FLD-CCNC: 20 U/L — SIGNIFICANT CHANGE UP (ref 0–41)
ANION GAP SERPL CALC-SCNC: 6 MMOL/L — LOW (ref 7–14)
APPEARANCE UR: CLEAR — SIGNIFICANT CHANGE UP
AST SERPL-CCNC: 11 U/L — SIGNIFICANT CHANGE UP (ref 0–41)
BASOPHILS # BLD AUTO: 0.03 K/UL — SIGNIFICANT CHANGE UP (ref 0–0.2)
BASOPHILS NFR BLD AUTO: 0.2 % — SIGNIFICANT CHANGE UP (ref 0–1)
BILIRUB DIRECT SERPL-MCNC: <0.2 MG/DL — SIGNIFICANT CHANGE UP (ref 0–0.3)
BILIRUB INDIRECT FLD-MCNC: >0.2 MG/DL — SIGNIFICANT CHANGE UP (ref 0.2–1.2)
BILIRUB SERPL-MCNC: 0.4 MG/DL — SIGNIFICANT CHANGE UP (ref 0.2–1.2)
BILIRUB UR-MCNC: NEGATIVE — SIGNIFICANT CHANGE UP
BUN SERPL-MCNC: 13 MG/DL — SIGNIFICANT CHANGE UP (ref 10–20)
CALCIUM SERPL-MCNC: 8.6 MG/DL — SIGNIFICANT CHANGE UP (ref 8.5–10.1)
CHLORIDE SERPL-SCNC: 101 MMOL/L — SIGNIFICANT CHANGE UP (ref 98–110)
CO2 SERPL-SCNC: 29 MMOL/L — SIGNIFICANT CHANGE UP (ref 17–32)
COLOR SPEC: YELLOW — SIGNIFICANT CHANGE UP
CREAT SERPL-MCNC: 0.8 MG/DL — SIGNIFICANT CHANGE UP (ref 0.7–1.5)
DIFF PNL FLD: ABNORMAL
EGFR: 86 ML/MIN/1.73M2 — SIGNIFICANT CHANGE UP
EOSINOPHIL # BLD AUTO: 0.36 K/UL — SIGNIFICANT CHANGE UP (ref 0–0.7)
EOSINOPHIL NFR BLD AUTO: 2.6 % — SIGNIFICANT CHANGE UP (ref 0–8)
EPI CELLS # UR: ABNORMAL /HPF
GLUCOSE SERPL-MCNC: 106 MG/DL — HIGH (ref 70–99)
GLUCOSE UR QL: NEGATIVE MG/DL — SIGNIFICANT CHANGE UP
HCT VFR BLD CALC: 43.7 % — SIGNIFICANT CHANGE UP (ref 42–52)
HGB BLD-MCNC: 14.4 G/DL — SIGNIFICANT CHANGE UP (ref 14–18)
IMM GRANULOCYTES NFR BLD AUTO: 0.5 % — HIGH (ref 0.1–0.3)
KETONES UR-MCNC: NEGATIVE — SIGNIFICANT CHANGE UP
LACTATE SERPL-SCNC: 0.9 MMOL/L — SIGNIFICANT CHANGE UP (ref 0.7–2)
LEUKOCYTE ESTERASE UR-ACNC: NEGATIVE — SIGNIFICANT CHANGE UP
LIDOCAIN IGE QN: 136 U/L — HIGH (ref 7–60)
LYMPHOCYTES # BLD AUTO: 1.68 K/UL — SIGNIFICANT CHANGE UP (ref 1.2–3.4)
LYMPHOCYTES # BLD AUTO: 11.9 % — LOW (ref 20.5–51.1)
MAGNESIUM SERPL-MCNC: 1.9 MG/DL — SIGNIFICANT CHANGE UP (ref 1.8–2.4)
MCHC RBC-ENTMCNC: 30.7 PG — SIGNIFICANT CHANGE UP (ref 27–31)
MCHC RBC-ENTMCNC: 33 G/DL — SIGNIFICANT CHANGE UP (ref 32–37)
MCV RBC AUTO: 93.2 FL — SIGNIFICANT CHANGE UP (ref 80–94)
MONOCYTES # BLD AUTO: 1.4 K/UL — HIGH (ref 0.1–0.6)
MONOCYTES NFR BLD AUTO: 9.9 % — HIGH (ref 1.7–9.3)
NEUTROPHILS # BLD AUTO: 10.57 K/UL — HIGH (ref 1.4–6.5)
NEUTROPHILS NFR BLD AUTO: 74.9 % — SIGNIFICANT CHANGE UP (ref 42.2–75.2)
NITRITE UR-MCNC: NEGATIVE — SIGNIFICANT CHANGE UP
NRBC # BLD: 0 /100 WBCS — SIGNIFICANT CHANGE UP (ref 0–0)
PH UR: 6 — SIGNIFICANT CHANGE UP (ref 5–8)
PLATELET # BLD AUTO: 172 K/UL — SIGNIFICANT CHANGE UP (ref 130–400)
POTASSIUM SERPL-MCNC: 4.2 MMOL/L — SIGNIFICANT CHANGE UP (ref 3.5–5)
POTASSIUM SERPL-SCNC: 4.2 MMOL/L — SIGNIFICANT CHANGE UP (ref 3.5–5)
PROT SERPL-MCNC: 5.4 G/DL — LOW (ref 6–8)
PROT UR-MCNC: NEGATIVE MG/DL — SIGNIFICANT CHANGE UP
RBC # BLD: 4.69 M/UL — LOW (ref 4.7–6.1)
RBC # FLD: 14.2 % — SIGNIFICANT CHANGE UP (ref 11.5–14.5)
RBC CASTS # UR COMP ASSIST: ABNORMAL /HPF
SARS-COV-2 RNA SPEC QL NAA+PROBE: SIGNIFICANT CHANGE UP
SODIUM SERPL-SCNC: 136 MMOL/L — SIGNIFICANT CHANGE UP (ref 135–146)
SP GR SPEC: 1.01 — SIGNIFICANT CHANGE UP (ref 1.01–1.03)
TROPONIN T SERPL-MCNC: <0.01 NG/ML — SIGNIFICANT CHANGE UP
UROBILINOGEN FLD QL: 0.2 MG/DL — SIGNIFICANT CHANGE UP
WBC # BLD: 14.11 K/UL — HIGH (ref 4.8–10.8)
WBC # FLD AUTO: 14.11 K/UL — HIGH (ref 4.8–10.8)
WBC UR QL: SIGNIFICANT CHANGE UP /HPF

## 2022-07-17 PROCEDURE — 93010 ELECTROCARDIOGRAM REPORT: CPT

## 2022-07-17 PROCEDURE — 99285 EMERGENCY DEPT VISIT HI MDM: CPT | Mod: FS

## 2022-07-17 PROCEDURE — 74177 CT ABD & PELVIS W/CONTRAST: CPT | Mod: 26,MA

## 2022-07-17 PROCEDURE — 76705 ECHO EXAM OF ABDOMEN: CPT | Mod: 26

## 2022-07-17 RX ORDER — CIPROFLOXACIN LACTATE 400MG/40ML
1 VIAL (ML) INTRAVENOUS
Qty: 20 | Refills: 0
Start: 2022-07-17 | End: 2022-07-26

## 2022-07-17 RX ORDER — METRONIDAZOLE 500 MG
500 TABLET ORAL ONCE
Refills: 0 | Status: COMPLETED | OUTPATIENT
Start: 2022-07-17 | End: 2022-07-17

## 2022-07-17 RX ORDER — CIPROFLOXACIN LACTATE 400MG/40ML
500 VIAL (ML) INTRAVENOUS ONCE
Refills: 0 | Status: COMPLETED | OUTPATIENT
Start: 2022-07-17 | End: 2022-07-17

## 2022-07-17 RX ORDER — SODIUM CHLORIDE 9 MG/ML
1000 INJECTION, SOLUTION INTRAVENOUS ONCE
Refills: 0 | Status: COMPLETED | OUTPATIENT
Start: 2022-07-17 | End: 2022-07-17

## 2022-07-17 RX ORDER — METRONIDAZOLE 500 MG
1 TABLET ORAL
Qty: 30 | Refills: 0
Start: 2022-07-17 | End: 2022-07-26

## 2022-07-17 RX ADMIN — Medication 500 MILLIGRAM(S): at 06:45

## 2022-07-17 RX ADMIN — SODIUM CHLORIDE 1000 MILLILITER(S): 9 INJECTION, SOLUTION INTRAVENOUS at 04:28

## 2022-07-17 RX ADMIN — Medication 500 MILLIGRAM(S): at 06:44

## 2022-07-17 NOTE — ED PROVIDER NOTE - PHYSICAL EXAMINATION
Physical Exam    Vital Signs: I have reviewed the initial vital signs.  Constitutional: appears stated age, no acute distress  Eyes: Conjunctiva pink, Sclera clear  Cardiovascular: S1 and S2, regular rate, regular rhythm, well-perfused extremities, radial pulses equal and 2+, pedal pulses 2+ and equal  Respiratory: unlabored respiratory effort, clear to auscultation bilaterally no wheezing, rales and rhonchi  Gastrointestinal: soft, mild diffuse ttp, no pulsatile mass, normal bowl sounds  Musculoskeletal: supple neck, no lower extremity edema, no midline tenderness  Integumentary: warm, dry, no rash  Neurologic: awake, alert, nvi

## 2022-07-17 NOTE — ED PROVIDER NOTE - NSFOLLOWUPINSTRUCTIONS_ED_ALL_ED_FT
Diarrhea    Diarrhea is frequent loose and watery bowel movements that has many causes. Diarrhea can make you feel weak and cause you to become dehydrated. Diarrhea typically lasts 3-5 days. However, it can last longer if it is a sign of something more serious. Drink clear fluids to prevent dehydration. Eat bland, easy-to-digest foods as tolerated.     SEEK IMMEDIATE MEDICAL CARE IF YOU HAVE THE FOLLOWING SYMPTOMS: fever, lightheadedness/dizziness, chest pain, black or bloody stools, shortness of breath, severe abdominal or back pain, or any signs of dehydration.    Abdominal Pain    Many things can cause abdominal pain. Usually, abdominal pain is not caused by a disease and will improve without treatment. Your health care provider will do a physical exam and possibly order blood tests and imaging to help determine the seriousness of your pain. However, in many cases, no cause may be found and you may need further testing as an outpatient. Monitor your abdominal pain for any changes.     SEEK IMMEDIATE MEDICAL CARE IF YOU HAVE THE FOLLOWING SYMPTOMS: worsening abdominal pain, vomiting, diarrhea, inability to have bowel movements or pass gas, black or bloody stool, fever accompanying chest pain or back pain, or dizziness/lightheadedness.

## 2022-07-17 NOTE — ED PROVIDER NOTE - CLINICAL SUMMARY MEDICAL DECISION MAKING FREE TEXT BOX
Labs noted for WBC 14 K otherwise unremarkable.  Urinalysis negative.  CT abdomen consistent with ileocolitis.  Will DC on Cipro and Flagyl and refer to GI. Patient presented with multiple episodes of watery nonbloody diarrhea, frequent urination as well as diffuse abdominal pain x2 days as documented.  Tender on exam but otherwise afebrile, hemodynamically stable.  Obtained labs which were grossly unremarkable including no significant leukocytosis, anemia, signs of dehydration/THOMPSON, transaminitis or significant electrolyte abnormalities.  UA grossly negative for infection.  CT abdomen pelvis revealed signs of possible enterocolitis, also with possible gallbladder mass although they recommended medical quadrant ultrasound.  Right upper quadrant ultrasound was subsequently obtained and negative for emergent pathologies.  Pain otherwise controlled in ED and patient able to tolerate p.o., serial abdominal exams benign.  Given the above, will discharge home with outpatient follow up. Patient agreeable with plan. Agrees to return to ED for any new or worsening symptoms.

## 2022-07-17 NOTE — ED PROVIDER NOTE - CARE PROVIDER_API CALL
Efrain Ambrose)  Gastroenterology; Internal Medicine  [address] [place] [address]  Phone: [phone]  Fax: [phone]  Follow Up Time: 1-3 Days

## 2022-07-17 NOTE — ED PROVIDER NOTE - CARE PLAN
1 Principal Discharge DX:	Diarrhea  Secondary Diagnosis:	Abdominal pain  Secondary Diagnosis:	Polyuria

## 2022-07-17 NOTE — ED PROVIDER NOTE - OBJECTIVE STATEMENT
88 yo male pmh of hld, htn, gerd, presents to ed for diarrhea and frequent urination x 2 days, mild, abd pain, radiation. Denies fever, chills, cp, sob, nv, dysuria, hematuria.

## 2022-07-17 NOTE — ED PROVIDER NOTE - NS ED ATTENDING STATEMENT MOD
This was a shared visit with the MARGRET. I reviewed and verified the documentation and independently performed the documented:

## 2022-07-17 NOTE — ED PROVIDER NOTE - PATIENT PORTAL LINK FT
You can access the FollowMyHealth Patient Portal offered by [facility] by registering at the following website: [email or web address]. By joining [employer]’s FollowMyHealth portal, you will also be able to view your health information using other applications (apps) compatible with our system.

## 2022-07-17 NOTE — ED PROVIDER NOTE - ATTENDING APP SHARED VISIT CONTRIBUTION OF CARE
I personally evaluated the patient. I reviewed the Resident´s or Physician Assistant´s note (as assigned above), and agree with the findings and plan except as documented in my note.  87-year-old male, history of HTN, HLD, enlarged prostate, brought in for diarrhea and mild abdominal pain for 2 days.  Patient states he has been urinating a lot.  No fever.  Wife states that they had food in [employer] prior to symptoms.  Exam shows alert patient in no distress, HEENT NCAT PERRL, throat no exudates, lungs clear, RR S1S2, abdomen soft mild generalized tenderness +BS, no CCE, skin no rash, neuro A&OX3 GCS 15 no deficits.

## 2022-07-19 LAB
CULTURE RESULTS: SIGNIFICANT CHANGE UP
SPECIMEN SOURCE: SIGNIFICANT CHANGE UP

## 2022-07-28 NOTE — PATIENT PROFILE ADULT - NSTOBACCONEVERSMOKERY/N_GEN_A
Spoke with Savanna Wooten, provider at [facility].    Agree with taper plan - if patient admits while taper still ongoing, ok to complete taper at LP.    OK to continue Vyvanse while admitted.    Miquel Lee MD     No

## 2022-08-15 ENCOUNTER — APPOINTMENT (OUTPATIENT)
Dept: GASTROENTEROLOGY | Facility: CLINIC | Age: 87
End: 2022-08-15

## 2022-08-22 ENCOUNTER — RX RENEWAL (OUTPATIENT)
Age: 87
End: 2022-08-22

## 2022-11-08 NOTE — ED ADULT TRIAGE NOTE - CHIEF COMPLAINT QUOTE
Subjective:   Boo Yancey difficulty urinating "Right arm, Patient Position: Sitting, BP Cuff Size: Adult)   Pulse 76   Temp 36.9 °C (98.4 °F) (Temporal)   Resp 16   Ht 1.727 m (5' 8\")   Wt (!) 136 kg (300 lb)   SpO2 99%     Physical Exam  Vitals reviewed.   Constitutional:       Appearance: Normal appearance.   HENT:      Head: Normocephalic and atraumatic.      Right Ear: Tympanic membrane, ear canal and external ear normal.      Left Ear: Tympanic membrane, ear canal and external ear normal.      Nose: Nose normal.      Mouth/Throat:      Mouth: Mucous membranes are moist.      Pharynx: Oropharynx is clear.   Eyes:      Extraocular Movements: Extraocular movements intact.      Conjunctiva/sclera: Conjunctivae normal.      Pupils: Pupils are equal, round, and reactive to light.   Cardiovascular:      Rate and Rhythm: Normal rate and regular rhythm.   Pulmonary:      Effort: Pulmonary effort is normal.      Breath sounds: Normal breath sounds.   Abdominal:      General: Abdomen is flat.      Palpations: Abdomen is soft.   Musculoskeletal:      Cervical back: Normal range of motion and neck supple.      Lumbar back: Tenderness present. Positive right straight leg raise test.      Comments: Back: Full ROM, 5/5 LE strength, sensation intact bilaterally in LE, no TTP over spinous processes, paraspinals, SI joint nontender, straight leg raise positive on the right.     Skin:     General: Skin is warm and dry.      Capillary Refill: Capillary refill takes less than 2 seconds.   Neurological:      General: No focal deficit present.      Mental Status: She is alert.   Psychiatric:         Mood and Affect: Mood normal.         Behavior: Behavior normal.       Assessment/Plan:     Diagnosis and associated orders:     1. Acute low back pain with radicular symptoms, duration less than 6 weeks  Referral to Physical Therapy    gabapentin (NEURONTIN) 300 MG Cap         Comments/MDM:     Suspect patient has disc protrusion with compression of the cord versus stenosis.  " CANDY Hooks

## 2022-12-06 ENCOUNTER — RX RENEWAL (OUTPATIENT)
Age: 87
End: 2022-12-06

## 2022-12-15 ENCOUNTER — APPOINTMENT (OUTPATIENT)
Dept: UROLOGY | Facility: CLINIC | Age: 87
End: 2022-12-15

## 2022-12-15 VITALS
SYSTOLIC BLOOD PRESSURE: 116 MMHG | BODY MASS INDEX: 26.29 KG/M2 | DIASTOLIC BLOOD PRESSURE: 67 MMHG | HEIGHT: 64 IN | HEART RATE: 55 BPM | WEIGHT: 154 LBS

## 2022-12-15 DIAGNOSIS — Z00.00 ENCOUNTER FOR GENERAL ADULT MEDICAL EXAMINATION W/OUT ABNORMAL FINDINGS: ICD-10-CM

## 2022-12-15 PROCEDURE — 99214 OFFICE O/P EST MOD 30 MIN: CPT

## 2022-12-18 LAB
BILIRUB UR QL STRIP: NORMAL
COLLECTION METHOD: NORMAL
GLUCOSE UR-MCNC: NORMAL
HCG UR QL: 0.2 EU/DL
HGB UR QL STRIP.AUTO: NORMAL
KETONES UR-MCNC: NORMAL
LEUKOCYTE ESTERASE UR QL STRIP: NORMAL
NITRITE UR QL STRIP: NORMAL
PH UR STRIP: 6
PROT UR STRIP-MCNC: NORMAL
SP GR UR STRIP: 1.01

## 2022-12-18 NOTE — HISTORY OF PRESENT ILLNESS
[FreeTextEntry1] : 88 yo seen 9/2019 for retention associated with UTI\par no repeat bout of infection or retention \par \par PVR 71 cc today\par \par no complains of lower abdominal pain \par has nocturia - not bothersome\par feels flomax is helping him\par \par \par renal and bladder US 8/25/2020\par prostate 74 cc\par post void 27 cc\par \par \par

## 2022-12-18 NOTE — PHYSICAL EXAM
[General Appearance - Well Developed] : well developed [Normal Appearance] : normal appearance [] : no respiratory distress [Oriented To Time, Place, And Person] : oriented to person, place, and time [No Focal Deficits] : no focal deficits

## 2022-12-18 NOTE — ASSESSMENT
[FreeTextEntry1] : 86 yo with excellent response to flomax\par feels well\par persistent non-bothersome nocturia\par \par PVR 71 cc\par \par - Flomax renewed\par - f/u as needed\par - primary care continued follow up

## 2023-02-01 ENCOUNTER — APPOINTMENT (OUTPATIENT)
Dept: CARDIOLOGY | Facility: CLINIC | Age: 88
End: 2023-02-01
Payer: MEDICARE

## 2023-02-01 VITALS
WEIGHT: 151 LBS | HEIGHT: 64 IN | OXYGEN SATURATION: 98 % | BODY MASS INDEX: 25.78 KG/M2 | SYSTOLIC BLOOD PRESSURE: 116 MMHG | DIASTOLIC BLOOD PRESSURE: 70 MMHG | HEART RATE: 53 BPM

## 2023-02-01 DIAGNOSIS — Z86.39 PERSONAL HISTORY OF OTHER ENDOCRINE, NUTRITIONAL AND METABOLIC DISEASE: ICD-10-CM

## 2023-02-01 PROCEDURE — 99204 OFFICE O/P NEW MOD 45 MIN: CPT

## 2023-02-01 PROCEDURE — 93000 ELECTROCARDIOGRAM COMPLETE: CPT

## 2023-02-01 RX ORDER — NITROFURANTOIN (MONOHYDRATE/MACROCRYSTALS) 25; 75 MG/1; MG/1
100 CAPSULE ORAL
Qty: 20 | Refills: 0 | Status: DISCONTINUED | COMMUNITY
Start: 2021-04-25 | End: 2023-02-01

## 2023-02-01 RX ORDER — DILTIAZEM HYDROCHLORIDE 240 MG/1
240 CAPSULE, EXTENDED RELEASE ORAL
Refills: 0 | Status: DISCONTINUED | COMMUNITY
End: 2023-02-01

## 2023-02-01 RX ORDER — LISINOPRIL 20 MG/1
20 TABLET ORAL
Refills: 0 | Status: DISCONTINUED | COMMUNITY
End: 2023-02-01

## 2023-02-01 RX ORDER — BUPROPION HYDROCHLORIDE 300 MG/1
300 TABLET, EXTENDED RELEASE ORAL
Refills: 0 | Status: DISCONTINUED | COMMUNITY
End: 2023-02-01

## 2023-02-01 RX ORDER — ISOSORBIDE MONONITRATE 30 MG/1
30 TABLET, EXTENDED RELEASE ORAL DAILY
Refills: 0 | Status: ACTIVE | COMMUNITY

## 2023-02-01 RX ORDER — BIMATOPROST 0.1 MG/ML
0.01 SOLUTION/ DROPS OPHTHALMIC
Refills: 0 | Status: DISCONTINUED | COMMUNITY
End: 2023-02-01

## 2023-02-01 RX ORDER — FLUTICASONE FUROATE, UMECLIDINIUM BROMIDE AND VILANTEROL TRIFENATATE 100; 62.5; 25 UG/1; UG/1; UG/1
100-62.5-25 POWDER RESPIRATORY (INHALATION)
Qty: 3 | Refills: 3 | Status: DISCONTINUED | COMMUNITY
Start: 2021-11-09 | End: 2023-02-01

## 2023-02-01 RX ORDER — FAMOTIDINE 20 MG/1
20 TABLET, FILM COATED ORAL
Refills: 0 | Status: ACTIVE | COMMUNITY

## 2023-02-01 RX ORDER — ASPIRIN ENTERIC COATED TABLETS 81 MG 81 MG/1
81 TABLET, DELAYED RELEASE ORAL DAILY
Refills: 0 | Status: ACTIVE | COMMUNITY

## 2023-02-01 RX ORDER — FLUTICASONE FUROATE, UMECLIDINIUM BROMIDE AND VILANTEROL TRIFENATATE 100; 62.5; 25 UG/1; UG/1; UG/1
100-62.5-25 POWDER RESPIRATORY (INHALATION)
Refills: 0 | Status: DISCONTINUED | COMMUNITY
End: 2023-02-01

## 2023-02-01 NOTE — PROCEDURE
[FreeTextEntry1] : [facility] 2/2020 LM luminal\par mid LAD 40%\par LCx minimal \par RCA mild diffuse\par LVEDP 30

## 2023-02-01 NOTE — CARDIOLOGY SUMMARY
[de-identified] : EKG 2/1/23 sinus yane 53 bpm, RBBB, inferior infarct \par EKG per Dr. Ha 12/22 sinus yane 52 bpm, iRBBB, NS ST T abn [de-identified] : TTE 6/2022 per Dr. Ha , mild AS AV peak gradient 13 mm Hg, AV mean gradient 5 mm Hg, CHRISTINA 2 cm,  mild to mod AI, mild MR< mild TR [de-identified] : Pharm NST 1/2019 no ischemia, EF 67% [de-identified] : Carotid US 6/2021 <50% ICA stenosis b/l \par AA US 1/2010 ectatic 2.9 cm

## 2023-02-01 NOTE — ASSESSMENT
[FreeTextEntry1] : Assessment:\par #BERRY\par #AI/AS\par #Non-obs CAD\par #HTN - controlled \par #HLD - at goal on rosuva 20\par #Active smoker\par \par 12/13/22\par Cr 0.84, K 4.1\par , TG 61, HDL 52, LDL 52\par AST 17, ALT 15\par \par Plan:\par - TTE to assess AS\par - Continue ASA 81 and rosuvastatin 20 mg daily\par - Continue diltiazem 240, Imdur 30, lisinopril 10\par - Unclear if on propranolol for cardiac indication or tremor \par - Smoking cessation\par - Return to clinic in 3 months

## 2023-02-01 NOTE — REVIEW OF SYSTEMS
[Feeling Fatigued] : not feeling fatigued [SOB] : shortness of breath [Dyspnea on exertion] : dyspnea during exertion [Chest Discomfort] : no chest discomfort [Lower Ext Edema] : no extremity edema [Leg Claudication] : no intermittent leg claudication [Palpitations] : no palpitations [Orthopnea] : no orthopnea [PND] : no PND [Syncope] : no syncope [Wheezing] : wheezing [Dizziness] : dizziness

## 2023-02-01 NOTE — HISTORY OF PRESENT ILLNESS
[FreeTextEntry1] : 88M with HTN, HLD, urinary UTI, non-obstructive CAD, MR, TR, mild-mod AS/AI, active smoker here to establish care. \par \par Referring: his significant other is my patient \par \par He denies symptoms today. Hard of hearing - forgot hearing aids at home. His partner reports BERRY with 1/8 block. Denies CP, palpitations, claudication. +Dizziness when standing up to quickly. +hip pain/arthritis \par \par Tobacco use for 50+ years \par \par PCP: Dr. Girard\par Previous cardiologist is Dr. Ha\par \par \par

## 2023-02-01 NOTE — PHYSICAL EXAM
[No Acute Distress] : no acute distress [No Carotid Bruit] : no carotid bruit [Normal S1, S2] : normal S1, S2 [No Murmur] : no murmur [No Rub] : no rub [No Gallop] : no gallop [Clear Lung Fields] : clear lung fields [Wheeze ____] : wheeze [unfilled] [Soft] : abdomen soft [Moves all extremities] : moves all extremities [No edema] : no edema [No varicosities] : no varicosities [No chronic venous stasis changes] : no chronic venous stasis changes [de-identified] : no tremor on today's exam

## 2023-02-17 ENCOUNTER — APPOINTMENT (OUTPATIENT)
Dept: ORTHOPEDIC SURGERY | Facility: CLINIC | Age: 88
End: 2023-02-17
Payer: MEDICARE

## 2023-02-17 ENCOUNTER — RESULT CHARGE (OUTPATIENT)
Age: 88
End: 2023-02-17

## 2023-02-17 DIAGNOSIS — Z96.641 PRESENCE OF RIGHT ARTIFICIAL HIP JOINT: ICD-10-CM

## 2023-02-17 PROCEDURE — 72100 X-RAY EXAM L-S SPINE 2/3 VWS: CPT

## 2023-02-17 PROCEDURE — 99213 OFFICE O/P EST LOW 20 MIN: CPT

## 2023-02-17 PROCEDURE — 73502 X-RAY EXAM HIP UNI 2-3 VIEWS: CPT

## 2023-02-17 NOTE — PHYSICAL EXAM
[Right] : right hip [2+] : posterior tibialis pulse: 2+ [FreeTextEntry8] : Diffuse right side lower back tenderness [] : no greater trochanteric tenderness [FreeTextEntry9] : Good range of motion throughout with mild pain to lower back [de-identified] : Good strength throughout

## 2023-02-17 NOTE — HISTORY OF PRESENT ILLNESS
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  Patient states he has pain that comes and goes to the hip area/lower back and area.  Patient was treated in the past for lower back by neurology in [place] and has been doing physical therapy in [place].  Patient denies recent injury/trauma, denies numbness/tingling.  Patient uses rollator for ambulation.

## 2023-02-17 NOTE — DISCUSSION/SUMMARY
[de-identified] : Discussed with patient that most of his pain seems to be coming from the lumbar spine.  Discussed treatment options in detail including rest, ice/heat, range of motion exercise, physical therapy.  Physical therapy prescription given.  Advised patient follow-up with neurology/pain management for further evaluation of lumbar spine.  Advised patient to call if symptoms worsen or change.  Patient understands agrees with plan.  Seen under supervision of Dr. Shane.

## 2023-02-17 NOTE — DATA REVIEWED
[FreeTextEntry1] : X-ray right hip: No acute fractures, subluxations, dislocations.  Surgical hardware intact in good position. [FreeTextEntry2] : X-ray lumbar spine: No obvious acute fractures, subluxations, dislocations.  Degenerative changes throughout lumbar spine.

## 2023-02-22 NOTE — PATIENT PROFILE ADULT - NSTOBACCOWITHDRW_GEN_A_CORE_SD
irritability Mucosal Advancement Flap Text: Given the location of the defect, shape of the defect and the proximity to free margins a mucosal advancement flap was deemed most appropriate. Incisions were made with a 15 blade scalpel in the appropriate fashion along the cutaneous vermilion border and the mucosal lip. The remaining actinically damaged mucosal tissue was excised.  The mucosal advancement flap was then elevated to the gingival sulcus with care taken to preserve the neurovascular structures and advanced into the primary defect. Care was taken to ensure that precise realignment of the vermilion border was achieved.

## 2023-03-28 ENCOUNTER — APPOINTMENT (OUTPATIENT)
Dept: ORTHOPEDIC SURGERY | Facility: CLINIC | Age: 88
End: 2023-03-28

## 2023-04-03 ENCOUNTER — APPOINTMENT (OUTPATIENT)
Dept: CARDIOLOGY | Facility: CLINIC | Age: 88
End: 2023-04-03
Payer: MEDICARE

## 2023-04-03 PROCEDURE — 93306 TTE W/DOPPLER COMPLETE: CPT

## 2023-04-11 RX ORDER — PROPRANOLOL HYDROCHLORIDE 20 MG/1
20 TABLET ORAL
Qty: 90 | Refills: 0 | Status: ACTIVE | COMMUNITY
Start: 1900-01-01 | End: 1900-01-01

## 2023-04-25 ENCOUNTER — APPOINTMENT (OUTPATIENT)
Dept: CARDIOLOGY | Facility: CLINIC | Age: 88
End: 2023-04-25
Payer: MEDICARE

## 2023-04-25 VITALS — DIASTOLIC BLOOD PRESSURE: 80 MMHG | SYSTOLIC BLOOD PRESSURE: 128 MMHG

## 2023-04-25 VITALS — HEIGHT: 64 IN | TEMPERATURE: 97.6 F | WEIGHT: 152 LBS | BODY MASS INDEX: 25.95 KG/M2

## 2023-04-25 DIAGNOSIS — M79.606 PAIN IN LEG, UNSPECIFIED: ICD-10-CM

## 2023-04-25 PROCEDURE — 99214 OFFICE O/P EST MOD 30 MIN: CPT

## 2023-04-25 NOTE — ASSESSMENT
[FreeTextEntry1] : Assessment:\par #BERRY\par #L>R leg pain\par #Mild to mod AI and mild AS\par #Non-obs CAD \par - [facility] 2020 LAD 40%\par #HTN - controlled \par #HLD - at goal on rosuva 20\par #Active smoker\par \par 12/13/22\par Cr 0.84, K 4.1\par , TG 61, HDL 52, LDL 52\par AST 17, ALT 15\par \par Plan:\par - Bilat LE art US and JACKELYN/PVR to assess for PAD given risk factors and symptoms \par - Follow up with pulmonary\par - Continue ASA 81 and rosuvastatin 20 mg daily\par - Continue diltiazem 240, Imdur 30, lisinopril 10, propranolol \par - Smoking cessation\par - Repeat echo 4/2024 for surveillance of moderate AI\par - Return to clinic in 3 months

## 2023-04-25 NOTE — HISTORY OF PRESENT ILLNESS
[FreeTextEntry1] : 88M with HTN, HLD, urinary UTI, non-obstructive CAD, mod TR, mild-mod AS/AI, active smoker here for follow-up.\par \par 4/25/23\par BERRY with a few steps. Sleep study and PFTs next month. \par \par +L>R leg pain with ambulation. No CP, palplitations, LE edema, wounds. \par \par [place] in August \par \par Trying to cut down on smoking\par \par 2/1/23\par He denies symptoms today. Hard of hearing - forgot hearing aids at home. His partner reports BERRY with 1/8 block. Denies CP, palpitations, claudication. +Dizziness when standing up to quickly. +hip pain/arthritis \par \par Tobacco use for 50+ years \par \par PCP: Dr. Girard\par Previous cardiologist is Dr. Ha\par \par \par

## 2023-04-25 NOTE — PROCEDURE
[FreeTextEntry1] : [facility] 2/2020 LM luminal\par mid LAD 40%\par LCx minimal \par RCA mild diffuse\par LVEDP 30

## 2023-04-25 NOTE — REVIEW OF SYSTEMS
[SOB] : shortness of breath [Dyspnea on exertion] : dyspnea during exertion [Feeling Fatigued] : not feeling fatigued [Chest Discomfort] : no chest discomfort [Lower Ext Edema] : no extremity edema [Leg Claudication] : no intermittent leg claudication [Palpitations] : no palpitations [Orthopnea] : no orthopnea [PND] : no PND [Syncope] : no syncope [Wheezing] : no wheezing

## 2023-04-25 NOTE — CARDIOLOGY SUMMARY
[de-identified] : EKG 2/1/23 sinus yane 53 bpm, RBBB, inferior infarct \par EKG per Dr. Ha 12/22 sinus yane 52 bpm, iRBBB, NS ST T abn [de-identified] : TTE 6/2022 per Dr. Ha , mild AS AV peak gradient 13 mm Hg, AV mean gradient 5 mm Hg, CHRISTINA 2 cm,  mild to mod AI, mild MR< mild TR\par TTE 4/3/23 normal biV sys function, EF 69%, mild AS, mild to mod AI, mod TR [de-identified] : Pharm NST 1/2019 no ischemia, EF 67% [de-identified] : Carotid US 6/2021 <50% ICA stenosis b/l \par AA US 1/2010 ectatic 2.9 cm

## 2023-04-25 NOTE — PHYSICAL EXAM
[No Acute Distress] : no acute distress [No Carotid Bruit] : no carotid bruit [Normal S1, S2] : normal S1, S2 [No Murmur] : no murmur [No Rub] : no rub [No Gallop] : no gallop [Clear Lung Fields] : clear lung fields [Wheeze ____] : wheeze [unfilled] [Soft] : abdomen soft [Moves all extremities] : moves all extremities [No edema] : no edema [No varicosities] : no varicosities [No chronic venous stasis changes] : no chronic venous stasis changes [de-identified] : no tremor on today's exam

## 2023-05-01 ENCOUNTER — APPOINTMENT (OUTPATIENT)
Dept: PULMONOLOGY | Facility: CLINIC | Age: 88
End: 2023-05-01
Payer: MEDICARE

## 2023-05-01 VITALS
HEIGHT: 67 IN | OXYGEN SATURATION: 96 % | WEIGHT: 152 LBS | HEART RATE: 69 BPM | BODY MASS INDEX: 23.86 KG/M2 | DIASTOLIC BLOOD PRESSURE: 62 MMHG | SYSTOLIC BLOOD PRESSURE: 118 MMHG

## 2023-05-01 PROCEDURE — 99214 OFFICE O/P EST MOD 30 MIN: CPT

## 2023-05-01 NOTE — PHYSICAL EXAM
[No Acute Distress] : no acute distress [Normal Oropharynx] : normal oropharynx [Normal Appearance] : normal appearance [No Neck Mass] : no neck mass [Normal Rate/Rhythm] : normal rate/rhythm [Normal S1, S2] : normal s1, s2 [No Resp Distress] : no resp distress [Clear to Auscultation Bilaterally] : clear to auscultation bilaterally [No Focal Deficits] : no focal deficits [Oriented x3] : oriented x3

## 2023-05-01 NOTE — END OF VISIT
[] : Fellow [FreeTextEntry3] : Patient seen and examined with the fellow agree with above note\par I again told the patient that he need to take the Trelegy every day not as needed.\par Told him and the family that he needs to repeat CT scan he already has an appointment for CAT scan at [facility] at [place] and one of the satellite radiology clinic.  Told him that I need the result when he do it\par He is pending sleep study\par Pending PFT

## 2023-05-01 NOTE — REASON FOR VISIT
[Follow-Up] : a follow-up visit [COPD] : COPD [Pulmonary Nodules] : pulmonary nodules [Shortness of Breath] : shortness of breath

## 2023-05-03 ENCOUNTER — APPOINTMENT (OUTPATIENT)
Dept: SLEEP CENTER | Facility: HOSPITAL | Age: 88
End: 2023-05-03
Payer: MEDICARE

## 2023-05-03 ENCOUNTER — OUTPATIENT (OUTPATIENT)
Dept: OUTPATIENT SERVICES | Facility: HOSPITAL | Age: 88
LOS: 1 days | Discharge: HOME | End: 2023-05-03
Payer: MEDICARE

## 2023-05-03 DIAGNOSIS — Z98.890 OTHER SPECIFIED POSTPROCEDURAL STATES: Chronic | ICD-10-CM

## 2023-05-03 DIAGNOSIS — G47.33 OBSTRUCTIVE SLEEP APNEA (ADULT) (PEDIATRIC): ICD-10-CM

## 2023-05-03 PROCEDURE — 95811 POLYSOM 6/>YRS CPAP 4/> PARM: CPT | Mod: 26

## 2023-05-03 PROCEDURE — 95811 POLYSOM 6/>YRS CPAP 4/> PARM: CPT

## 2023-05-05 DIAGNOSIS — G47.33 OBSTRUCTIVE SLEEP APNEA (ADULT) (PEDIATRIC): ICD-10-CM

## 2023-05-12 ENCOUNTER — TRANSCRIPTION ENCOUNTER (OUTPATIENT)
Age: 88
End: 2023-05-12

## 2023-05-12 ENCOUNTER — APPOINTMENT (OUTPATIENT)
Dept: PAIN MANAGEMENT | Facility: CLINIC | Age: 88
End: 2023-05-12
Payer: MEDICARE

## 2023-05-12 VITALS — HEART RATE: 91 BPM | SYSTOLIC BLOOD PRESSURE: 138 MMHG | DIASTOLIC BLOOD PRESSURE: 85 MMHG

## 2023-05-12 VITALS — BODY MASS INDEX: 23.86 KG/M2 | WEIGHT: 152 LBS | HEIGHT: 67 IN

## 2023-05-12 PROCEDURE — 99204 OFFICE O/P NEW MOD 45 MIN: CPT

## 2023-05-12 NOTE — ASSESSMENT
[FreeTextEntry1] : This is a 88 year old male with mostly localized lower back pain with occasional radicular features into the bilateral lower extremities. Previous injection therapy provided him with moderate relief of his symptoms. Before moving forward with injections, we will obtain a MRI of the lumbar spine. In the interim, I will send Lidocaine patches and Gabapentin 300 mg to the pharmacy for symptom management. He will follow up in 3 weeks to review imaging and for further evaluation. All this patients questions were answered and the conversation was understood well.\par \par Lumbar MRI was ordered to delineate spine pathology such as disc displacement and stenosis.\par \par I, Maxine Rivera, attest that this documentation has been prepared under the direction and in the presence of Provider Alison Campa MD.\par \par \par Thank you for allowing me to assist in the management of this patient. \par \par \par Best Regards, \par \par \par Alison Campa M.D., [facility]\par \par \par Diplomate, American Board of Physical Medicine and Rehabilitation\par Diplomate, American Board of Pain Medicine \par Diplomate, American Board of Pain Management\par

## 2023-05-12 NOTE — DATA REVIEWED
[FreeTextEntry1] : SOAPP: low on 5/12/23\par Low risk: Patient has combination of a low risk SOAP and no risk factors. UDS would be repeated randomly every quarter.\par \par UDS: No data obtained today.\par \par [place] REGISTRY: Checked.

## 2023-05-12 NOTE — HISTORY OF PRESENT ILLNESS
[FreeTextEntry1] : This is an 88 year old male here to establish care for lower back pain with radicular features into the bilateral lower extremities. He is here with a family member. He states that there is only radicular pain on occasion. He has a prior history of sciatic pain. He was previously under the care of another pain management provider in [place] who performed injections which provided him with moderate relief of his symptoms. Patient does not have records available for review and is unsure of the specific type of injection performed. The most recent injection was approximately 6 months ago. He attends PT sessions for symptom management with minimal relief. Pain is increased when ambulating and he states it feels like pulled muscles. He trials Tramadol sporadically which he does not believe provides him relief. There is no prior imaging for review today. \par \par Of note, patient is under the care of a neurologist for difficulty with memory. \par \par The patient has had this pain for 5 years. The pain started after no event Patient describes pain as moderate to severe nearly constant. During the last month the pain has been worse during the and no typical pattern. Patient has weakness in the lower extremities. Pain is increased with standing, sitting, walking. Pain is decreased with lying down. Pain is not changed with relaxation, coughing/sneezing, bowel movements. Bowel or bladder habits have not changed.\par \par ACTIVITIES: Patient could walk 1 blocks before the pain starts. Patient can sit 8 hours before pain starts. Patient can stand 15 minutes before pain starts. The patient often lays down because of pain. Patient uses walker at this time. Patient has difficulty performing household chores at this time.\par \par PRIOR PAIN TREATMENTS: No relief with physical therapy.\par \par PRIOR PAIN MEDICATIONS:  Tramadol, Tylenol, Toradol.\par \par \par

## 2023-05-12 NOTE — ASSESSMENT
[FreeTextEntry1] : This is a 88 year old male with mostly localized lower back pain with occasional radicular features into the bilateral lower extremities. Previous injection therapy provided him with moderate relief of his symptoms. Before moving forward with injections, we will obtain a MRI of the lumbar spine. In the interim, I will send Lidocaine patches and Gabapentin 300 mg to the pharmacy for symptom management. He will follow up in 3 weeks to review imaging and for further evaluation. All this patients questions were answered and the conversation was understood well.\par \par Lumbar MRI was ordered to delineate spine pathology such as disc displacement and stenosis.\par \par I, Maxine Rivera, attest that this documentation has been prepared under the direction and in the presence of Provider Alison Campa MD.\par \par \par Thank you for allowing me to assist in the management of this patient. \par \par \par Best Regards, \par \par \par Alison Campa M.D., [facility]\par \par \par Diplomate, American Board of Physical Medicine and Rehabilitation\par Diplomate, American Board of Pain Medicine \par Diplomate, American Board of Pain Management\par

## 2023-05-12 NOTE — PHYSICAL EXAM
[Normal Coordination] : normal coordination [Normal DTR UE/LE] : normal DTR UE/LE  [Normal Sensation] : normal sensation [Normal Mood and Affect] : normal mood and affect [Orientated] : orientated [Able to Communicate] : able to communicate [Well Developed] : well developed [Well Nourished] : well nourished [de-identified] : Oriented to self, but not to time [] : patient ambulates with assistive device

## 2023-05-12 NOTE — PHYSICAL EXAM
[Normal Coordination] : normal coordination [Normal DTR UE/LE] : normal DTR UE/LE  [Normal Sensation] : normal sensation [Normal Mood and Affect] : normal mood and affect [Orientated] : orientated [Able to Communicate] : able to communicate [Well Developed] : well developed [Well Nourished] : well nourished [de-identified] : Oriented to self, but not to time [] : patient ambulates with assistive device

## 2023-05-12 NOTE — ASSESSMENT
[FreeTextEntry1] : This is a 88 year old male with mostly localized lower back pain with occasional radicular features into the bilateral lower extremities. Previous injection therapy provided him with moderate relief of his symptoms. Before moving forward with injections, we will obtain a MRI of the lumbar spine. In the interim, I will send Lidocaine patches and Gabapentin 300 mg to the pharmacy for symptom management. He will follow up in 3 weeks to review imaging and for further evaluation. All this patients questions were answered and the conversation was understood well.\par \par Lumbar MRI was ordered to delineate spine pathology such as disc displacement and stenosis.\par \par I, Maxine Rivera, attest that this documentation has been prepared under the direction and in the presence of Provider Alison Campa MD.\par \par \par Thank you for allowing me to assist in the management of this patient. \par \par \par Best Regards, \par \par \par Alison Campa M.D., [facility]\par \par \par Diplomate, American Board of Physical Medicine and Rehabilitation\par Diplomate, American Board of Pain Medicine \par Diplomate, American Board of Pain Management\par

## 2023-05-12 NOTE — PHYSICAL EXAM
[Normal Coordination] : normal coordination [Normal DTR UE/LE] : normal DTR UE/LE  [Normal Sensation] : normal sensation [Normal Mood and Affect] : normal mood and affect [Orientated] : orientated [Able to Communicate] : able to communicate [Well Developed] : well developed [Well Nourished] : well nourished [de-identified] : Oriented to self, but not to time [] : patient ambulates with assistive device

## 2023-06-05 ENCOUNTER — APPOINTMENT (OUTPATIENT)
Dept: PULMONOLOGY | Facility: CLINIC | Age: 88
End: 2023-06-05
Payer: MEDICARE

## 2023-06-05 PROCEDURE — 94010 BREATHING CAPACITY TEST: CPT

## 2023-06-05 PROCEDURE — 94727 GAS DIL/WSHOT DETER LNG VOL: CPT

## 2023-06-05 PROCEDURE — 94729 DIFFUSING CAPACITY: CPT

## 2023-06-06 ENCOUNTER — APPOINTMENT (OUTPATIENT)
Dept: PAIN MANAGEMENT | Facility: CLINIC | Age: 88
End: 2023-06-06
Payer: MEDICARE

## 2023-06-06 VITALS — HEIGHT: 67 IN | BODY MASS INDEX: 23.86 KG/M2 | WEIGHT: 152 LBS

## 2023-06-06 DIAGNOSIS — M54.50 LOW BACK PAIN, UNSPECIFIED: ICD-10-CM

## 2023-06-06 PROCEDURE — 99214 OFFICE O/P EST MOD 30 MIN: CPT

## 2023-06-06 RX ORDER — LIDOCAINE 5% 700 MG/1
5 PATCH TOPICAL
Qty: 30 | Refills: 1 | Status: ACTIVE | COMMUNITY
Start: 2023-05-12 | End: 1900-01-01

## 2023-06-07 PROBLEM — M54.50 LOW BACK PAIN: Status: ACTIVE | Noted: 2023-05-12

## 2023-06-07 NOTE — ASSESSMENT
[FreeTextEntry1] : This is a 88 year old male with mostly localized lower back pain with occasional radicular features into the bilateral lower extremities. Previous injection therapy provided him with moderate relief of his symptoms. Imaging studies along with physical exam findings correlate symptomatology. We will move forward with a caudal SANTA x1 w/ mac and follow up afterwards.  In the interim, I will send a prescription of Lidocaine patches to the pharmacy.  All this patients questions were answered and the conversation was understood well.\par \par Patient had a MRI that shows a radicular component along with pain referred into the lower extremity. 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 The procedure performed will be close to major nerves, arteries, and spinal cord and/or joint structures. Due to the proximity of these structures, we need the patient to be still during the procedure. With the help of MAC, this will be safely achieved and decrease the risk of any complications.\par \par RISK AND BENEFIT PARAGRAPH: Risk, benefits, pros and cons of procedure were explained to the patient using models and diagrams and their questions were answered.\par \par \par I, Maxine Rivera, attest that this documentation has been prepared under the direction and in the presence of Provider Alison Campa MD.\par \par \par Thank you for allowing me to assist in the management of this patient. \par \par \par Best Regards, \par \par \par Alison Campa M.D., [facility]\par \par \par Diplomate, American Board of Physical Medicine and Rehabilitation\par Diplomate, American Board of Pain Medicine \par Diplomate, American Board of Pain Management\par

## 2023-06-07 NOTE — HISTORY OF PRESENT ILLNESS
[FreeTextEntry1] : ORIGINAL PRESENTATION: This is an 88 year old male here to establish care for lower back pain with radicular features into the bilateral lower extremities. He is here with a family member. He states that there is only radicular pain on occasion. He has a prior history of sciatic pain. He was previously under the care of another pain management provider in [place] who performed injections which provided him with moderate relief of his symptoms. Patient does not have records available for review and is unsure of the specific type of injection performed. The most recent injection was approximately 6 months ago. He attends PT sessions for symptom management with minimal relief. Pain is increased when ambulating and he states it feels like pulled muscles. He trials Tramadol sporadically which he does not believe provides him relief. There is no prior imaging for review today. \par \par Of note, patient is under the care of a neurologist for difficulty with memory. \par \par The patient has had this pain for 5 years. The pain started after no event Patient describes pain as moderate to severe nearly constant. During the last month the pain has been worse during the and no typical pattern. Patient has weakness in the lower extremities. Pain is increased with standing, sitting, walking. Pain is decreased with lying down. Pain is not changed with relaxation, coughing/sneezing, bowel movements. Bowel or bladder habits have not changed.\par \par ACTIVITIES: Patient could walk 1 blocks before the pain starts. Patient can sit 8 hours before pain starts. Patient can stand 15 minutes before pain starts. The patient often lays down because of pain. Patient uses walker at this time. Patient has difficulty performing household chores at this time.\par \par PRIOR PAIN TREATMENTS: No relief with physical therapy.\par \par PRIOR PAIN MEDICATIONS:  Tramadol, Tylenol, Toradol.\par \par PATIENT PRESENTS FOR FOLLOW UP: He is under our care for lower back pain with radicular features into the bilateral lower extremities. He states the pain continues from the spine to the feet. MRI of the lumbar spine was reviewed with the patient today and is documented below. He has been attending PT sessions with no relief of his symptoms. The option to move forward with injection therapy was discussed and he is agreeable to move forward.

## 2023-06-07 NOTE — PHYSICAL EXAM
[Normal Coordination] : normal coordination [Normal DTR UE/LE] : normal DTR UE/LE  [Normal Sensation] : normal sensation [Normal Mood and Affect] : normal mood and affect [Orientated] : orientated [Able to Communicate] : able to communicate [Well Developed] : well developed [Well Nourished] : well nourished [de-identified] : Oriented to self, but not to time [] : patient ambulates with assistive device

## 2023-06-07 NOTE — DATA REVIEWED
[FreeTextEntry1] : MRI of the lumbar spine dated 05/22/2023 finds fusiform increased T2 signal partially imaged in the visualized lower thoracic cord extending from the T9-T10 level the T12-L1 measuring up to 2 mm in AP dimension.  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  Dedicated MRI of the thoracic spine is suggested to assess the cephaled extent.  Mild disc bulging/ridging at the T11-12 through L1-2 levels and at L5-S1 with moderate bilateral neural foraminal narrowing at L1-2. Moderate to severe bilateral neural foraminal narrowing is also noted L5-S1.  Mild spinal stenosis at L2-3 and L4-5 with bilateral neural foraminal encroachment at both levels.  Moderate spinal stenosis and moderate to severe bilateral neural foraminal narrowing at L3-4.\par \par SOAPP: low on 5/12/23\par Low risk: Patient has combination of a low risk SOAP and no risk factors. UDS would be repeated randomly every quarter.\par \par UDS: No data obtained today.\par \par [place] REGISTRY: Checked.

## 2023-06-07 NOTE — ASSESSMENT
[FreeTextEntry1] : This is a 88 year old male with mostly localized lower back pain with occasional radicular features into the bilateral lower extremities. Previous injection therapy provided him with moderate relief of his symptoms. Imaging studies along with physical exam findings correlate symptomatology. We will move forward with a caudal SANTA x1 w/ mac and follow up afterwards.  In the interim, I will send a prescription of Lidocaine patches to the pharmacy.  All this patients questions were answered and the conversation was understood well.\par \par Patient had a MRI that shows a radicular component along with pain referred into the lower extremity. 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 The procedure performed will be close to major nerves, arteries, and spinal cord and/or joint structures. Due to the proximity of these structures, we need the patient to be still during the procedure. With the help of MAC, this will be safely achieved and decrease the risk of any complications.\par \par RISK AND BENEFIT PARAGRAPH: Risk, benefits, pros and cons of procedure were explained to the patient using models and diagrams and their questions were answered.\par \par \par I, Maxine Rivera, attest that this documentation has been prepared under the direction and in the presence of Provider Alison Campa MD.\par \par \par Thank you for allowing me to assist in the management of this patient. \par \par \par Best Regards, \par \par \par Alison Campa M.D., [facility]\par \par \par Diplomate, American Board of Physical Medicine and Rehabilitation\par Diplomate, American Board of Pain Medicine \par Diplomate, American Board of Pain Management\par

## 2023-06-07 NOTE — PHYSICAL EXAM
[Normal Coordination] : normal coordination [Normal DTR UE/LE] : normal DTR UE/LE  [Normal Sensation] : normal sensation [Normal Mood and Affect] : normal mood and affect [Orientated] : orientated [Able to Communicate] : able to communicate [Well Developed] : well developed [Well Nourished] : well nourished [de-identified] : Oriented to self, but not to time [] : patient ambulates with assistive device

## 2023-06-13 NOTE — ED PROVIDER NOTE - QRS
Xenograft Text: The defect edges were debeveled with a #15 scalpel blade.  Given the location of the defect, shape of the defect and the proximity to free margins a xenograft was deemed most appropriate.  The graft was then trimmed to fit the size of the defect.  The graft was then placed in the primary defect and oriented appropriately. Complex Repair And O-T Advancement Flap Text: The defect edges were debeveled with a #15 scalpel blade.  The primary defect was closed partially with a complex linear closure.  Given the location of the remaining defect, shape of the defect and the proximity to free margins an O-T advancement flap was deemed most appropriate for complete closure of the defect.  Using a sterile surgical marker, an appropriate advancement flap was drawn incorporating the defect and placing the expected incisions within the relaxed skin tension lines where possible.    The area thus outlined was incised deep to adipose tissue with a #15 scalpel blade.  The skin margins were undermined to an appropriate distance in all directions utilizing iris scissors. Complex Repair And Transposition Flap Text: The defect edges were debeveled with a #15 scalpel blade.  The primary defect was closed partially with a complex linear closure.  Given the location of the remaining defect, shape of the defect and the proximity to free margins a transposition flap was deemed most appropriate for complete closure of the defect.  Using a sterile surgical marker, an appropriate advancement flap was drawn incorporating the defect and placing the expected incisions within the relaxed skin tension lines where possible.    The area thus outlined was incised deep to adipose tissue with a #15 scalpel blade.  The skin margins were undermined to an appropriate distance in all directions utilizing iris scissors. Split-Thickness Skin Graft Text: The defect edges were debeveled with a #15 scalpel blade.  Given the location of the defect, shape of the defect and the proximity to free margins a split thickness skin graft was deemed most appropriate.  Using a sterile surgical marker, the primary defect shape was transferred to the donor site. The split thickness graft was then harvested.  The skin graft was then placed in the primary defect and oriented appropriately. Banner Transposition Flap Text: The defect edges were debeveled with a #15 scalpel blade.  Given the location of the defect and the proximity to free margins a Banner transposition flap was deemed most appropriate.  Using a sterile surgical marker, an appropriate flap drawn around the defect. The area thus outlined was incised deep to adipose tissue with a #15 scalpel blade.  The skin margins were undermined to an appropriate distance in all directions utilizing iris scissors. Complex Requirements: Involvement Of Free Margin?: No Paramedian Forehead Flap Text: A decision was made to reconstruct the defect utilizing an interpolation axial flap and a staged reconstruction.  A telfa template was made of the defect.  This telfa template was then used to outline the paramedian forehead pedicle flap.  The donor area for the pedicle flap was then injected with anesthesia.  The flap was excised through the skin and subcutaneous tissue down to the layer of the underlying musculature.  The pedicle flap was carefully excised within this deep plane to maintain its blood supply.  The edges of the donor site were undermined.   The donor site was closed in a primary fashion.  The pedicle was then rotated into position and sutured.  Once the tube was sutured into place, adequate blood supply was confirmed with blanching and refill.  The pedicle was then wrapped with xeroform gauze and dressed appropriately with a telfa and gauze bandage to ensure continued blood supply and protect the attached pedicle. Double M-Plasty Complex Repair Preamble Text (Leave Blank If You Do Not Want): Extensive wide undermining was performed. Path Notes (To The Dermatopathologist): No previous path Dermal Closure: simple interrupted Hemostasis: Aluminum Chloride Add Superficial Fascia When Documenting Dermal Sutures?: Yes O-T Plasty Text: The defect edges were debeveled with a #15 scalpel blade.  Given the location of the defect, shape of the defect and the proximity to free margins an O-T plasty was deemed most appropriate.  Using a sterile surgical marker, an appropriate O-T plasty was drawn incorporating the defect and placing the expected incisions within the relaxed skin tension lines where possible.    The area thus outlined was incised deep to adipose tissue with a #15 scalpel blade.  The skin margins were undermined to an appropriate distance in all directions utilizing iris scissors. Star Wedge Flap Text: The defect edges were debeveled with a #15 scalpel blade.  Given the location of the defect, shape of the defect and the proximity to free margins a star wedge flap was deemed most appropriate.  Using a sterile surgical marker, an appropriate rotation flap was drawn incorporating the defect and placing the expected incisions within the relaxed skin tension lines where possible. The area thus outlined was incised deep to adipose tissue with a #15 scalpel blade.  The skin margins were undermined to an appropriate distance in all directions utilizing iris scissors. Complex Repair And Split-Thickness Skin Graft Text: The defect edges were debeveled with a #15 scalpel blade.  The primary defect was closed partially with a complex linear closure.  Given the location of the defect, shape of the defect and the proximity to free margins a split thickness skin graft was deemed most appropriate to repair the remaining defect.  The graft was trimmed to fit the size of the remaining defect.  The graft was then placed in the primary defect, oriented appropriately, and sutured into place. X Size Of Lesion In Cm (Optional): 0 Dressing: pressure dressing Excisional Biopsy Additional Text (Leave Blank If You Do Not Want): The margin was drawn around the clinically apparent lesion. An elliptical shape was then drawn on the skin incorporating the lesion and margins.  Incisions were then made along these lines to the appropriate tissue plane and the lesion was extirpated. Double O-Z Flap Text: The defect edges were debeveled with a #15 scalpel blade.  Given the location of the defect, shape of the defect and the proximity to free margins a Double O-Z flap was deemed most appropriate.  Using a sterile surgical marker, an appropriate transposition flap was drawn incorporating the defect and placing the expected incisions within the relaxed skin tension lines where possible. The area thus outlined was incised deep to adipose tissue with a #15 scalpel blade.  The skin margins were undermined to an appropriate distance in all directions utilizing iris scissors. Melolabial Transposition Flap Text: The defect edges were debeveled with a #15 scalpel blade.  Given the location of the defect and the proximity to free margins a melolabial flap was deemed most appropriate.  Using a sterile surgical marker, an appropriate melolabial transposition flap was drawn incorporating the defect.    The area thus outlined was incised deep to adipose tissue with a #15 scalpel blade.  The skin margins were undermined to an appropriate distance in all directions utilizing iris scissors. Helical Rim Text: The closure involved the helical rim. Purse String (Simple) Text: Given the location of the defect and the characteristics of the surrounding skin a purse string simple closure was deemed most appropriate.  Undermining was performed circumferentially around the surgical defect.  A purse string suture was then placed and tightened. Bilobed Transposition Flap Text: The defect edges were debeveled with a #15 scalpel blade.  Given the location of the defect and the proximity to free margins a bilobed transposition flap was deemed most appropriate.  Using a sterile surgical marker, an appropriate bilobe flap drawn around the defect.    The area thus outlined was incised deep to adipose tissue with a #15 scalpel blade.  The skin margins were undermined to an appropriate distance in all directions utilizing iris scissors. Dorsal Nasal Flap Text: The defect edges were debeveled with a #15 scalpel blade.  Given the location of the defect and the proximity to free margins a dorsal nasal flap was deemed most appropriate.  Using a sterile surgical marker, an appropriate dorsal nasal flap was drawn around the defect.    The area thus outlined was incised deep to adipose tissue with a #15 scalpel blade.  The skin margins were undermined to an appropriate distance in all directions utilizing iris scissors. 132 Anesthesia Type: 1% lidocaine with 1:100,000 epinephrine Complex Repair And M Plasty Text: The defect edges were debeveled with a #15 scalpel blade.  The primary defect was closed partially with a complex linear closure.  Given the location of the remaining defect, shape of the defect and the proximity to free margins an M plasty was deemed most appropriate for complete closure of the defect.  Using a sterile surgical marker, an appropriate advancement flap was drawn incorporating the defect and placing the expected incisions within the relaxed skin tension lines where possible.    The area thus outlined was incised deep to adipose tissue with a #15 scalpel blade.  The skin margins were undermined to an appropriate distance in all directions utilizing iris scissors. Cartilage Graft Text: The defect edges were debeveled with a #15 scalpel blade.  Given the location of the defect, shape of the defect, the fact the defect involved a full thickness cartilage defect a cartilage graft was deemed most appropriate.  An appropriate donor site was identified, cleansed, and anesthetized. The cartilage graft was then harvested and transferred to the recipient site, oriented appropriately and then sutured into place.  The secondary defect was then repaired using a primary closure. Abbe Flap (Lower To Upper Lip) Text: The defect of the upper lip was assessed and measured.  Given the location and size of the defect, an Abbe flap was deemed most appropriate. Using a sterile surgical marker, an appropriate Abbe flap was measured and drawn on the lower lip. Local anesthesia was then infiltrated. A scalpel was then used to incise the upper lip through and through the skin, vermilion, muscle and mucosa, leaving the flap pedicled on the opposite side.  The flap was then rotated and transferred to the lower lip defect.  The flap was then sutured into place with a three layer technique, closing the orbicularis oris muscle layer with subcutaneous buried sutures, followed by a mucosal layer and an epidermal layer. Burow's Advancement Flap Text: The defect edges were debeveled with a #15 scalpel blade.  Given the location of the defect and the proximity to free margins a Burow's advancement flap was deemed most appropriate.  Using a sterile surgical marker, the appropriate advancement flap was drawn incorporating the defect and placing the expected incisions within the relaxed skin tension lines where possible.    The area thus outlined was incised deep to adipose tissue with a #15 scalpel blade.  The skin margins were undermined to an appropriate distance in all directions utilizing iris scissors. Lazy S Intermediate Repair Preamble Text (Leave Blank If You Do Not Want): Undermining was performed with blunt dissection. Epidermal Sutures: 4-0 Nylon Double O-Z Plasty Text: The defect edges were debeveled with a #15 scalpel blade.  Given the location of the defect, shape of the defect and the proximity to free margins a Double O-Z plasty (double transposition flap) was deemed most appropriate.  Using a sterile surgical marker, the appropriate transposition flaps were drawn incorporating the defect and placing the expected incisions within the relaxed skin tension lines where possible. The area thus outlined was incised deep to adipose tissue with a #15 scalpel blade.  The skin margins were undermined to an appropriate distance in all directions utilizing iris scissors.  Hemostasis was achieved with electrocautery.  The flaps were then transposed into place, one clockwise and the other counterclockwise, and anchored with interrupted buried subcutaneous sutures. Muscle Hinge Flap Text: The defect edges were debeveled with a #15 scalpel blade.  Given the size, depth and location of the defect and the proximity to free margins a muscle hinge flap was deemed most appropriate.  Using a sterile surgical marker, an appropriate hinge flap was drawn incorporating the defect. The area thus outlined was incised with a #15 scalpel blade.  The skin margins were undermined to an appropriate distance in all directions utilizing iris scissors. Complex Repair And Dermal Autograft Text: The defect edges were debeveled with a #15 scalpel blade.  The primary defect was closed partially with a complex linear closure.  Given the location of the defect, shape of the defect and the proximity to free margins an dermal autograft was deemed most appropriate to repair the remaining defect.  The graft was trimmed to fit the size of the remaining defect.  The graft was then placed in the primary defect, oriented appropriately, and sutured into place. Anesthesia Volume In Cc: 0.3 Advancement-Rotation Flap Text: The defect edges were debeveled with a #15 scalpel blade.  Given the location of the defect, shape of the defect and the proximity to free margins an advancement-rotation flap was deemed most appropriate.  Using a sterile surgical marker, an appropriate flap was drawn incorporating the defect and placing the expected incisions within the relaxed skin tension lines where possible. The area thus outlined was incised deep to adipose tissue with a #15 scalpel blade.  The skin margins were undermined to an appropriate distance in all directions utilizing iris scissors. Rhomboid Transposition Flap Text: The defect edges were debeveled with a #15 scalpel blade.  Given the location of the defect and the proximity to free margins a rhomboid transposition flap was deemed most appropriate.  Using a sterile surgical marker, an appropriate rhomboid flap was drawn incorporating the defect.    The area thus outlined was incised deep to adipose tissue with a #15 scalpel blade.  The skin margins were undermined to an appropriate distance in all directions utilizing iris scissors. Information: Selecting Yes will display possible errors in your note based on the variables you have selected. This validation is only offered as a suggestion for you. PLEASE NOTE THAT THE VALIDATION TEXT WILL BE REMOVED WHEN YOU FINALIZE YOUR NOTE. IF YOU WANT TO FAX A PRELIMINARY NOTE YOU WILL NEED TO TOGGLE THIS TO 'NO' IF YOU DO NOT WANT IT IN YOUR FAXED NOTE. Nostril Rim Text: The closure involved the nostril rim. V-Y Plasty Text: The defect edges were debeveled with a #15 scalpel blade.  Given the location of the defect, shape of the defect and the proximity to free margins an V-Y advancement flap was deemed most appropriate.  Using a sterile surgical marker, an appropriate advancement flap was drawn incorporating the defect and placing the expected incisions within the relaxed skin tension lines where possible.    The area thus outlined was incised deep to adipose tissue with a #15 scalpel blade.  The skin margins were undermined to an appropriate distance in all directions utilizing iris scissors. Excision Depth: dermis Complex Repair And Bilobe Flap Text: The defect edges were debeveled with a #15 scalpel blade.  The primary defect was closed partially with a complex linear closure.  Given the location of the remaining defect, shape of the defect and the proximity to free margins a bilobe flap was deemed most appropriate for complete closure of the defect.  Using a sterile surgical marker, an appropriate advancement flap was drawn incorporating the defect and placing the expected incisions within the relaxed skin tension lines where possible.    The area thus outlined was incised deep to adipose tissue with a #15 scalpel blade.  The skin margins were undermined to an appropriate distance in all directions utilizing iris scissors. Complex Repair And Tissue Cultured Epidermal Autograft Text: The defect edges were debeveled with a #15 scalpel blade.  The primary defect was closed partially with a complex linear closure.  Given the location of the defect, shape of the defect and the proximity to free margins an tissue cultured epidermal autograft was deemed most appropriate to repair the remaining defect.  The graft was trimmed to fit the size of the remaining defect.  The graft was then placed in the primary defect, oriented appropriately, and sutured into place. Mercedes Flap Text: The defect edges were debeveled with a #15 scalpel blade.  Given the location of the defect, shape of the defect and the proximity to free margins a Mercedes flap was deemed most appropriate.  Using a sterile surgical marker, an appropriate advancement flap was drawn incorporating the defect and placing the expected incisions within the relaxed skin tension lines where possible. The area thus outlined was incised deep to adipose tissue with a #15 scalpel blade.  The skin margins were undermined to an appropriate distance in all directions utilizing iris scissors. Repair Performed By Another Provider Text (Leave Blank If You Do Not Want): After the tissue was excised the defect was repaired by another provider. Bi-Rhombic Flap Text: The defect edges were debeveled with a #15 scalpel blade.  Given the location of the defect and the proximity to free margins a bi-rhombic flap was deemed most appropriate.  Using a sterile surgical marker, an appropriate rhombic flap was drawn incorporating the defect. The area thus outlined was incised deep to adipose tissue with a #15 scalpel blade.  The skin margins were undermined to an appropriate distance in all directions utilizing iris scissors. Deep Sutures: 4-0 Polysorb Interpolation Flap Text: A decision was made to reconstruct the defect utilizing an interpolation axial flap and a staged reconstruction.  A telfa template was made of the defect.  This telfa template was then used to outline the interpolation flap.  The donor area for the pedicle flap was then injected with anesthesia.  The flap was excised through the skin and subcutaneous tissue down to the layer of the underlying musculature.  The interpolation flap was carefully excised within this deep plane to maintain its blood supply.  The edges of the donor site were undermined.   The donor site was closed in a primary fashion.  The pedicle was then rotated into position and sutured.  Once the tube was sutured into place, adequate blood supply was confirmed with blanching and refill.  The pedicle was then wrapped with xeroform gauze and dressed appropriately with a telfa and gauze bandage to ensure continued blood supply and protect the attached pedicle. Hemigard Retention Suture: 2-0 Nylon Lab Facility: 3 Complex Repair And Modified Advancement Flap Text: The defect edges were debeveled with a #15 scalpel blade.  The primary defect was closed partially with a complex linear closure.  Given the location of the remaining defect, shape of the defect and the proximity to free margins a modified advancement flap was deemed most appropriate for complete closure of the defect.  Using a sterile surgical marker, an appropriate advancement flap was drawn incorporating the defect and placing the expected incisions within the relaxed skin tension lines where possible.    The area thus outlined was incised deep to adipose tissue with a #15 scalpel blade.  The skin margins were undermined to an appropriate distance in all directions utilizing iris scissors. Post-Care Instructions: I reviewed with the patient in detail post-care instructions. Patient is not to engage in any heavy lifting, exercise, or swimming for the next 14 days. Should the patient develop any fevers, chills, bleeding, severe pain patient will contact the office immediately. Suturegard Body: The suture ends were repeatedly re-tightened and re-clamped to achieve the desired tissue expansion. Alar Island Pedicle Flap Text: The defect edges were debeveled with a #15 scalpel blade.  Given the location of the defect, shape of the defect and the proximity to the alar rim an island pedicle advancement flap was deemed most appropriate.  Using a sterile surgical marker, an appropriate advancement flap was drawn incorporating the defect, outlining the appropriate donor tissue and placing the expected incisions within the nasal ala running parallel to the alar rim. The area thus outlined was incised with a #15 scalpel blade.  The skin margins were undermined minimally to an appropriate distance in all directions around the primary defect and laterally outward around the island pedicle utilizing iris scissors.  There was minimal undermining beneath the pedicle flap. Dermal Autograft Text: The defect edges were debeveled with a #15 scalpel blade.  Given the location of the defect, shape of the defect and the proximity to free margins a dermal autograft was deemed most appropriate.  Using a sterile surgical marker, the primary defect shape was transferred to the donor site. The area thus outlined was incised deep to adipose tissue with a #15 scalpel blade.  The harvested graft was then trimmed of adipose and epidermal tissue until only dermis was left.  The skin graft was then placed in the primary defect and oriented appropriately. Where Do You Want The Question To Include Opioid Counseling Located?: Case Summary Tab Complex Repair And Z Plasty Text: The defect edges were debeveled with a #15 scalpel blade.  The primary defect was closed partially with a complex linear closure.  Given the location of the remaining defect, shape of the defect and the proximity to free margins a Z plasty was deemed most appropriate for complete closure of the defect.  Using a sterile surgical marker, an appropriate advancement flap was drawn incorporating the defect and placing the expected incisions within the relaxed skin tension lines where possible.    The area thus outlined was incised deep to adipose tissue with a #15 scalpel blade.  The skin margins were undermined to an appropriate distance in all directions utilizing iris scissors. Scalpel Size: pair of gradle scissors Fusiform Excision Additional Text (Leave Blank If You Do Not Want): The margin was drawn around the clinically apparent lesion.  A fusiform shape was then drawn on the skin incorporating the lesion and margins.  Incisions were then made along these lines to the appropriate tissue plane and the lesion was extirpated. W Plasty Text: The lesion was extirpated to the level of the fat with a #15 scalpel blade.  Given the location of the defect, shape of the defect and the proximity to free margins a W-plasty was deemed most appropriate for repair.  Using a sterile surgical marker, the appropriate transposition arms of the W-plasty were drawn incorporating the defect and placing the expected incisions within the relaxed skin tension lines where possible.    The area thus outlined was incised deep to adipose tissue with a #15 scalpel blade.  The skin margins were undermined to an appropriate distance in all directions utilizing iris scissors.  The opposing transposition arms were then transposed into place in opposite direction and anchored with interrupted buried subcutaneous sutures. Eye Clamp Note Details: An eye clamp was used during the procedure. A-T Advancement Flap Text: The defect edges were debeveled with a #15 scalpel blade.  Given the location of the defect, shape of the defect and the proximity to free margins an A-T advancement flap was deemed most appropriate.  Using a sterile surgical marker, an appropriate advancement flap was drawn incorporating the defect and placing the expected incisions within the relaxed skin tension lines where possible.    The area thus outlined was incised deep to adipose tissue with a #15 scalpel blade.  The skin margins were undermined to an appropriate distance in all directions utilizing iris scissors. Mucosal Advancement Flap Text: Given the location of the defect, shape of the defect and the proximity to free margins a mucosal advancement flap was deemed most appropriate. Incisions were made with a 15 blade scalpel in the appropriate fashion along the cutaneous vermilion border and the mucosal lip. The remaining actinically damaged mucosal tissue was excised.  The mucosal advancement flap was then elevated to the gingival sulcus with care taken to preserve the neurovascular structures and advanced into the primary defect. Care was taken to ensure that precise realignment of the vermilion border was achieved. Skin Substitute Text: The defect edges were debeveled with a #15 scalpel blade.  Given the location of the defect, shape of the defect and the proximity to free margins a skin substitute graft was deemed most appropriate.  The graft material was trimmed to fit the size of the defect. The graft was then placed in the primary defect and oriented appropriately. Complex Repair And Rotation Flap Text: The defect edges were debeveled with a #15 scalpel blade.  The primary defect was closed partially with a complex linear closure.  Given the location of the remaining defect, shape of the defect and the proximity to free margins a rotation flap was deemed most appropriate for complete closure of the defect.  Using a sterile surgical marker, an appropriate advancement flap was drawn incorporating the defect and placing the expected incisions within the relaxed skin tension lines where possible.    The area thus outlined was incised deep to adipose tissue with a #15 scalpel blade.  The skin margins were undermined to an appropriate distance in all directions utilizing iris scissors. Lip Wedge Excision Repair Text: Given the location of the defect and the proximity to free margins a full thickness wedge repair was deemed most appropriate.  Using a sterile surgical marker, the appropriate repair was drawn incorporating the defect and placing the expected incisions perpendicular to the vermilion border.  The vermilion border was also meticulously outlined to ensure appropriate reapproximation during the repair.  The area thus outlined was incised through and through with a #15 scalpel blade.  The muscularis and dermis were reaproximated with deep sutures following hemostasis. Care was taken to realign the vermilion border before proceeding with the superficial closure.  Once the vermilion was realigned the superfical and mucosal closure was finished. Complex Repair And Skin Substitute Graft Text: The defect edges were debeveled with a #15 scalpel blade.  The primary defect was closed partially with a complex linear closure.  Given the location of the remaining defect, shape of the defect and the proximity to free margins a skin substitute graft was deemed most appropriate to repair the remaining defect.  The graft was trimmed to fit the size of the remaining defect.  The graft was then placed in the primary defect, oriented appropriately, and sutured into place. Adjacent Tissue Transfer Text: The defect edges were debeveled with a #15 scalpel blade.  Given the location of the defect and the proximity to free margins an adjacent tissue transfer was deemed most appropriate.  Using a sterile surgical marker, an appropriate flap was drawn incorporating the defect and placing the expected incisions within the relaxed skin tension lines where possible.    The area thus outlined was incised deep to adipose tissue with a #15 scalpel blade.  The skin margins were undermined to an appropriate distance in all directions utilizing iris scissors. Retention Suture Text: Retention sutures were placed to support the closure and prevent dehiscence. Bilateral Helical Rim Advancement Flap Text: The defect edges were debeveled with a #15 blade scalpel.  Given the location of the defect and the proximity to free margins (helical rim) a bilateral helical rim advancement flap was deemed most appropriate.  Using a sterile surgical marker, the appropriate advancement flaps were drawn incorporating the defect and placing the expected incisions between the helical rim and antihelix where possible.  The area thus outlined was incised through and through with a #15 scalpel blade.  With a skin hook and iris scissors, the flaps were gently and sharply undermined and freed up. Mastoid Interpolation Flap Text: A decision was made to reconstruct the defect utilizing an interpolation axial flap and a staged reconstruction.  A telfa template was made of the defect.  This telfa template was then used to outline the mastoid interpolation flap.  The donor area for the pedicle flap was then injected with anesthesia.  The flap was excised through the skin and subcutaneous tissue down to the layer of the underlying musculature.  The pedicle flap was carefully excised within this deep plane to maintain its blood supply.  The edges of the donor site were undermined.   The donor site was closed in a primary fashion.  The pedicle was then rotated into position and sutured.  Once the tube was sutured into place, adequate blood supply was confirmed with blanching and refill.  The pedicle was then wrapped with xeroform gauze and dressed appropriately with a telfa and gauze bandage to ensure continued blood supply and protect the attached pedicle. Hemigard Postcare Instructions: The HEMIGARD strips are to remain completely dry for at least 5-7 days. Undermining Type: Entire Wound Complex Repair And Ftsg Text: The defect edges were debeveled with a #15 scalpel blade.  The primary defect was closed partially with a complex linear closure.  Given the location of the defect, shape of the defect and the proximity to free margins a full thickness skin graft was deemed most appropriate to repair the remaining defect.  The graft was trimmed to fit the size of the remaining defect.  The graft was then placed in the primary defect, oriented appropriately, and sutured into place. Lab: 6 Island Pedicle Flap-Requiring Vessel Identification Text: The defect edges were debeveled with a #15 scalpel blade.  Given the location of the defect, shape of the defect and the proximity to free margins an island pedicle advancement flap was deemed most appropriate.  Using a sterile surgical marker, an appropriate advancement flap was drawn, based on the axial vessel mentioned above, incorporating the defect, outlining the appropriate donor tissue and placing the expected incisions within the relaxed skin tension lines where possible.    The area thus outlined was incised deep to adipose tissue with a #15 scalpel blade.  The skin margins were undermined to an appropriate distance in all directions around the primary defect and laterally outward around the island pedicle utilizing iris scissors.  There was minimal undermining beneath the pedicle flap. Saucerization Excision Additional Text (Leave Blank If You Do Not Want): The margin was drawn around the clinically apparent lesion.  Incisions were then made along these lines, in a tangential fashion, to the appropriate tissue plane and the lesion was extirpated. O-L Flap Text: The defect edges were debeveled with a #15 scalpel blade.  Given the location of the defect, shape of the defect and the proximity to free margins an O-L flap was deemed most appropriate.  Using a sterile surgical marker, an appropriate advancement flap was drawn incorporating the defect and placing the expected incisions within the relaxed skin tension lines where possible.    The area thus outlined was incised deep to adipose tissue with a #15 scalpel blade.  The skin margins were undermined to an appropriate distance in all directions utilizing iris scissors. Nasalis-Muscle-Based Myocutaneous Island Pedicle Flap Text: Using a #15 blade, an incision was made around the donor flap to the level of the nasalis muscle. Wide lateral undermining was then performed in both the subcutaneous plane above the nasalis muscle, and in a submuscular plane just above periosteum. This allowed the formation of a free nasalis muscle axial pedicle (based on the angular artery) which was still attached to the actual cutaneous flap, increasing its mobility and vascular viability. Hemostasis was obtained with pinpoint electrocoagulation. The flap was mobilized into position and the pivotal anchor points positioned and stabilized with buried interrupted sutures. Subcutaneous and dermal tissues were closed in a multilayered fashion with sutures. Tissue redundancies were excised, and the epidermal edges were apposed without significant tension and sutured with sutures. Hatchet Flap Text: The defect edges were debeveled with a #15 scalpel blade.  Given the location of the defect, shape of the defect and the proximity to free margins a hatchet flap was deemed most appropriate.  Using a sterile surgical marker, an appropriate hatchet flap was drawn incorporating the defect and placing the expected incisions within the relaxed skin tension lines where possible.    The area thus outlined was incised deep to adipose tissue with a #15 scalpel blade.  The skin margins were undermined to an appropriate distance in all directions utilizing iris scissors. Zygomaticofacial Flap Text: Given the location of the defect, shape of the defect and the proximity to free margins a zygomaticofacial flap was deemed most appropriate for repair.  Using a sterile surgical marker, the appropriate flap was drawn incorporating the defect and placing the expected incisions within the relaxed skin tension lines where possible. The area thus outlined was incised deep to adipose tissue with a #15 scalpel blade with preservation of a vascular pedicle.  The skin margins were undermined to an appropriate distance in all directions utilizing iris scissors.  The flap was then placed into the defect and anchored with interrupted buried subcutaneous sutures. Wound Care: Vaseline Keystone Flap Text: The defect edges were debeveled with a #15 scalpel blade.  Given the location of the defect, shape of the defect a keystone flap was deemed most appropriate.  Using a sterile surgical marker, an appropriate keystone flap was drawn incorporating the defect, outlining the appropriate donor tissue and placing the expected incisions within the relaxed skin tension lines where possible. The area thus outlined was incised deep to adipose tissue with a #15 scalpel blade.  The skin margins were undermined to an appropriate distance in all directions around the primary defect and laterally outward around the flap utilizing iris scissors. Staged Advancement Flap Text: The defect edges were debeveled with a #15 scalpel blade.  Given the location of the defect, shape of the defect and the proximity to free margins a staged advancement flap was deemed most appropriate.  Using a sterile surgical marker, an appropriate advancement flap was drawn incorporating the defect and placing the expected incisions within the relaxed skin tension lines where possible. The area thus outlined was incised deep to adipose tissue with a #15 scalpel blade.  The skin margins were undermined to an appropriate distance in all directions utilizing iris scissors. Complex Repair And Burow's Graft Text: The defect edges were debeveled with a #15 scalpel blade.  The primary defect was closed partially with a complex linear closure.  Given the location of the defect, shape of the defect, the proximity to free margins and the presence of a standing cone deformity a Burow's graft was deemed most appropriate to repair the remaining defect.  The graft was trimmed to fit the size of the remaining defect.  The graft was then placed in the primary defect, oriented appropriately, and sutured into place. Saucerization Depth: dermis and superficial adipose tissue Number Of Hemigard Strips Per Side: 1 Size Of Lesion In Cm: 0.2 Slit Excision Additional Text (Leave Blank If You Do Not Want): A linear line was drawn on the skin overlying the lesion. An incision was made slowly until the lesion was visualized.  Once visualized, the lesion was removed with blunt dissection. O-Z Flap Text: The defect edges were debeveled with a #15 scalpel blade.  Given the location of the defect, shape of the defect and the proximity to free margins an O-Z flap was deemed most appropriate.  Using a sterile surgical marker, an appropriate transposition flap was drawn incorporating the defect and placing the expected incisions within the relaxed skin tension lines where possible. The area thus outlined was incised deep to adipose tissue with a #15 scalpel blade.  The skin margins were undermined to an appropriate distance in all directions utilizing iris scissors. Retention Suture Bite Size: 3 mm Medical Necessity Information: It is in your best interest to select a reason for this procedure from the list below. All of these items fulfill various CMS LCD requirements except lesion extends to a margin. Orbicularis Oris Muscle Flap Text: The defect edges were debeveled with a #15 scalpel blade.  Given that the defect affected the competency of the oral sphincter an orbicularis oris muscle flap was deemed most appropriate to restore this competency and normal muscle function.  Using a sterile surgical marker, an appropriate flap was drawn incorporating the defect. The area thus outlined was incised with a #15 scalpel blade. Advancement Flap (Single) Text: The defect edges were debeveled with a #15 scalpel blade.  Given the location of the defect and the proximity to free margins a single advancement flap was deemed most appropriate.  Using a sterile surgical marker, an appropriate advancement flap was drawn incorporating the defect and placing the expected incisions within the relaxed skin tension lines where possible.    The area thus outlined was incised deep to adipose tissue with a #15 scalpel blade.  The skin margins were undermined to an appropriate distance in all directions utilizing iris scissors. Rotation Flap Text: The defect edges were debeveled with a #15 scalpel blade.  Given the location of the defect, shape of the defect and the proximity to free margins a rotation flap was deemed most appropriate.  Using a sterile surgical marker, an appropriate rotation flap was drawn incorporating the defect and placing the expected incisions within the relaxed skin tension lines where possible.    The area thus outlined was incised deep to adipose tissue with a #15 scalpel blade.  The skin margins were undermined to an appropriate distance in all directions utilizing iris scissors. Cheek Interpolation Flap Text: A decision was made to reconstruct the defect utilizing an interpolation axial flap and a staged reconstruction.  A telfa template was made of the defect.  This telfa template was then used to outline the Cheek Interpolation flap.  The donor area for the pedicle flap was then injected with anesthesia.  The flap was excised through the skin and subcutaneous tissue down to the layer of the underlying musculature.  The interpolation flap was carefully excised within this deep plane to maintain its blood supply.  The edges of the donor site were undermined.   The donor site was closed in a primary fashion.  The pedicle was then rotated into position and sutured.  Once the tube was sutured into place, adequate blood supply was confirmed with blanching and refill.  The pedicle was then wrapped with xeroform gauze and dressed appropriately with a telfa and gauze bandage to ensure continued blood supply and protect the attached pedicle. Purse String (Intermediate) Text: Given the location of the defect and the characteristics of the surrounding skin a purse string intermediate closure was deemed most appropriate.  Undermining was performed circumferentially around the surgical defect.  A purse string suture was then placed and tightened. Complex Repair And O-L Flap Text: The defect edges were debeveled with a #15 scalpel blade.  The primary defect was closed partially with a complex linear closure.  Given the location of the remaining defect, shape of the defect and the proximity to free margins an O-L flap was deemed most appropriate for complete closure of the defect.  Using a sterile surgical marker, an appropriate flap was drawn incorporating the defect and placing the expected incisions within the relaxed skin tension lines where possible.    The area thus outlined was incised deep to adipose tissue with a #15 scalpel blade.  The skin margins were undermined to an appropriate distance in all directions utilizing iris scissors. Bilobed Flap Text: The defect edges were debeveled with a #15 scalpel blade.  Given the location of the defect and the proximity to free margins a bilobe flap was deemed most appropriate.  Using a sterile surgical marker, an appropriate bilobe flap drawn around the defect.    The area thus outlined was incised deep to adipose tissue with a #15 scalpel blade.  The skin margins were undermined to an appropriate distance in all directions utilizing iris scissors. Trilobed Flap Text: The defect edges were debeveled with a #15 scalpel blade.  Given the location of the defect and the proximity to free margins a trilobed flap was deemed most appropriate.  Using a sterile surgical marker, an appropriate trilobed flap drawn around the defect.    The area thus outlined was incised deep to adipose tissue with a #15 scalpel blade.  The skin margins were undermined to an appropriate distance in all directions utilizing iris scissors. Complex Repair And V-Y Plasty Text: The defect edges were debeveled with a #15 scalpel blade.  The primary defect was closed partially with a complex linear closure.  Given the location of the remaining defect, shape of the defect and the proximity to free margins a V-Y plasty was deemed most appropriate for complete closure of the defect.  Using a sterile surgical marker, an appropriate advancement flap was drawn incorporating the defect and placing the expected incisions within the relaxed skin tension lines where possible.    The area thus outlined was incised deep to adipose tissue with a #15 scalpel blade.  The skin margins were undermined to an appropriate distance in all directions utilizing iris scissors. Burow's Graft Text: The defect edges were debeveled with a #15 scalpel blade.  Given the location of the defect, shape of the defect, the proximity to free margins and the presence of a standing cone deformity a Burow's skin graft was deemed most appropriate. The standing cone was removed and this tissue was then trimmed to the shape of the primary defect. The adipose tissue was also removed until only dermis and epidermis were left.  The skin margins of the secondary defect were undermined to an appropriate distance in all directions utilizing iris scissors.  The secondary defect was closed with interrupted buried subcutaneous sutures.  The skin edges were then re-apposed with running  sutures.  The skin graft was then placed in the primary defect and oriented appropriately. Abbe Flap (Upper To Lower Lip) Text: The defect of the lower lip was assessed and measured.  Given the location and size of the defect, an Abbe flap was deemed most appropriate. Using a sterile surgical marker, an appropriate Abbe flap was measured and drawn on the upper lip. Local anesthesia was then infiltrated.  A scalpel was then used to incise the upper lip through and through the skin, vermilion, muscle and mucosa, leaving the flap pedicled on the opposite side.  The flap was then rotated and transferred to the lower lip defect.  The flap was then sutured into place with a three layer technique, closing the orbicularis oris muscle layer with subcutaneous buried sutures, followed by a mucosal layer and an epidermal layer. Advancement Flap (Double) Text: The defect edges were debeveled with a #15 scalpel blade.  Given the location of the defect and the proximity to free margins a double advancement flap was deemed most appropriate.  Using a sterile surgical marker, the appropriate advancement flaps were drawn incorporating the defect and placing the expected incisions within the relaxed skin tension lines where possible.    The area thus outlined was incised deep to adipose tissue with a #15 scalpel blade.  The skin margins were undermined to an appropriate distance in all directions utilizing iris scissors. Length To Time In Minutes Device Was In Place: 10 Positioning (Leave Blank If You Do Not Want): The patient was placed in a comfortable position exposing the surgical site. O-Z Plasty Text: The defect edges were debeveled with a #15 scalpel blade.  Given the location of the defect, shape of the defect and the proximity to free margins an O-Z plasty (double transposition flap) was deemed most appropriate.  Using a sterile surgical marker, the appropriate transposition flaps were drawn incorporating the defect and placing the expected incisions within the relaxed skin tension lines where possible.    The area thus outlined was incised deep to adipose tissue with a #15 scalpel blade.  The skin margins were undermined to an appropriate distance in all directions utilizing iris scissors.  Hemostasis was achieved with electrocautery.  The flaps were then transposed into place, one clockwise and the other counterclockwise, and anchored with interrupted buried subcutaneous sutures. Transposition Flap Text: The defect edges were debeveled with a #15 scalpel blade.  Given the location of the defect and the proximity to free margins a transposition flap was deemed most appropriate.  Using a sterile surgical marker, an appropriate transposition flap was drawn incorporating the defect.    The area thus outlined was incised deep to adipose tissue with a #15 scalpel blade.  The skin margins were undermined to an appropriate distance in all directions utilizing iris scissors. Complex Repair And Epidermal Autograft Text: The defect edges were debeveled with a #15 scalpel blade.  The primary defect was closed partially with a complex linear closure.  Given the location of the defect, shape of the defect and the proximity to free margins an epidermal autograft was deemed most appropriate to repair the remaining defect.  The graft was trimmed to fit the size of the remaining defect.  The graft was then placed in the primary defect, oriented appropriately, and sutured into place. Size Of Margin In Cm: 0.1 Perilesional Excision Additional Text (Leave Blank If You Do Not Want): The margin was drawn around the clinically apparent lesion. Incisions were then made along these lines to the appropriate tissue plane and the lesion was extirpated. V-Y Flap Text: The defect edges were debeveled with a #15 scalpel blade.  Given the location of the defect, shape of the defect and the proximity to free margins a V-Y flap was deemed most appropriate.  Using a sterile surgical marker, an appropriate advancement flap was drawn incorporating the defect and placing the expected incisions within the relaxed skin tension lines where possible.    The area thus outlined was incised deep to adipose tissue with a #15 scalpel blade.  The skin margins were undermined to an appropriate distance in all directions utilizing iris scissors. Cheek-To-Nose Interpolation Flap Text: A decision was made to reconstruct the defect utilizing an interpolation axial flap and a staged reconstruction.  A telfa template was made of the defect.  This telfa template was then used to outline the Cheek-To-Nose Interpolation flap.  The donor area for the pedicle flap was then injected with anesthesia.  The flap was excised through the skin and subcutaneous tissue down to the layer of the underlying musculature.  The interpolation flap was carefully excised within this deep plane to maintain its blood supply.  The edges of the donor site were undermined.   The donor site was closed in a primary fashion.  The pedicle was then rotated into position and sutured.  Once the tube was sutured into place, adequate blood supply was confirmed with blanching and refill.  The pedicle was then wrapped with xeroform gauze and dressed appropriately with a telfa and gauze bandage to ensure continued blood supply and protect the attached pedicle. Rhombic Flap Text: The defect edges were debeveled with a #15 scalpel blade.  Given the location of the defect and the proximity to free margins a rhombic flap was deemed most appropriate.  Using a sterile surgical marker, an appropriate rhombic flap was drawn incorporating the defect.    The area thus outlined was incised deep to adipose tissue with a #15 scalpel blade.  The skin margins were undermined to an appropriate distance in all directions utilizing iris scissors. Vermilion Border Text: The closure involved the vermilion border. Complex Repair And Single Advancement Flap Text: The defect edges were debeveled with a #15 scalpel blade.  The primary defect was closed partially with a complex linear closure.  Given the location of the remaining defect, shape of the defect and the proximity to free margins a single advancement flap was deemed most appropriate for complete closure of the defect.  Using a sterile surgical marker, an appropriate advancement flap was drawn incorporating the defect and placing the expected incisions within the relaxed skin tension lines where possible.    The area thus outlined was incised deep to adipose tissue with a #15 scalpel blade.  The skin margins were undermined to an appropriate distance in all directions utilizing iris scissors. Island Pedicle Flap Text: The defect edges were debeveled with a #15 scalpel blade.  Given the location of the defect, shape of the defect and the proximity to free margins an island pedicle advancement flap was deemed most appropriate.  Using a sterile surgical marker, an appropriate advancement flap was drawn incorporating the defect, outlining the appropriate donor tissue and placing the expected incisions within the relaxed skin tension lines where possible.    The area thus outlined was incised deep to adipose tissue with a #15 scalpel blade.  The skin margins were undermined to an appropriate distance in all directions around the primary defect and laterally outward around the island pedicle utilizing iris scissors.  There was minimal undermining beneath the pedicle flap. Complex Repair And Double M Plasty Text: The defect edges were debeveled with a #15 scalpel blade.  The primary defect was closed partially with a complex linear closure.  Given the location of the remaining defect, shape of the defect and the proximity to free margins a double M plasty was deemed most appropriate for complete closure of the defect.  Using a sterile surgical marker, an appropriate advancement flap was drawn incorporating the defect and placing the expected incisions within the relaxed skin tension lines where possible.    The area thus outlined was incised deep to adipose tissue with a #15 scalpel blade.  The skin margins were undermined to an appropriate distance in all directions utilizing iris scissors. Composite Graft Text: The defect edges were debeveled with a #15 scalpel blade.  Given the location of the defect, shape of the defect, the proximity to free margins and the fact the defect was full thickness a composite graft was deemed most appropriate.  The defect was outline and then transferred to the donor site.  A full thickness graft was then excised from the donor site. The graft was then placed in the primary defect, oriented appropriately and then sutured into place.  The secondary defect was then repaired using a primary closure. Estimated Blood Loss (Cc): minimal Detail Level: Detailed Repair Type: None - Secondary Intention Medical Necessity Clause: This procedure was medically necessary because the lesion that was treated was: Estlander Flap (Upper To Lower Lip) Text: The defect of the lower lip was assessed and measured.  Given the location and size of the defect, an Estlander flap was deemed most appropriate. Using a sterile surgical marker, an appropriate Estlander flap was measured and drawn on the upper lip. Local anesthesia was then infiltrated. A scalpel was then used to incise the lateral aspect of the flap, through skin, muscle and mucosa, leaving the flap pedicled medially.  The flap was then rotated and positioned to fill the lower lip defect.  The flap was then sutured into place with a three layer technique, closing the orbicularis oris muscle layer with subcutaneous buried sutures, followed by a mucosal layer and an epidermal layer. Chonodrocutaneous Helical Advancement Flap Text: The defect edges were debeveled with a #15 scalpel blade.  Given the location of the defect and the proximity to free margins a chondrocutaneous helical advancement flap was deemed most appropriate.  Using a sterile surgical marker, the appropriate advancement flap was drawn incorporating the defect and placing the expected incisions within the relaxed skin tension lines where possible.    The area thus outlined was incised deep to adipose tissue with a #15 scalpel blade.  The skin margins were undermined to an appropriate distance in all directions utilizing iris scissors. Complex Repair And Double Advancement Flap Text: The defect edges were debeveled with a #15 scalpel blade.  The primary defect was closed partially with a complex linear closure.  Given the location of the remaining defect, shape of the defect and the proximity to free margins a double advancement flap was deemed most appropriate for complete closure of the defect.  Using a sterile surgical marker, an appropriate advancement flap was drawn incorporating the defect and placing the expected incisions within the relaxed skin tension lines where possible.    The area thus outlined was incised deep to adipose tissue with a #15 scalpel blade.  The skin margins were undermined to an appropriate distance in all directions utilizing iris scissors. Suturegard Intro: Intraoperative tissue expansion was performed, utilizing the SUTUREGARD device, in order to reduce wound tension. Island Pedicle Flap With Canthal Suspension Text: The defect edges were debeveled with a #15 scalpel blade.  Given the location of the defect, shape of the defect and the proximity to free margins an island pedicle advancement flap was deemed most appropriate.  Using a sterile surgical marker, an appropriate advancement flap was drawn incorporating the defect, outlining the appropriate donor tissue and placing the expected incisions within the relaxed skin tension lines where possible. The area thus outlined was incised deep to adipose tissue with a #15 scalpel blade.  The skin margins were undermined to an appropriate distance in all directions around the primary defect and laterally outward around the island pedicle utilizing iris scissors.  There was minimal undermining beneath the pedicle flap. A suspension suture was placed in the canthal tendon to prevent tension and prevent ectropion. Billing Type: Third-Party Bill Epidermal Autograft Text: The defect edges were debeveled with a #15 scalpel blade.  Given the location of the defect, shape of the defect and the proximity to free margins an epidermal autograft was deemed most appropriate.  Using a sterile surgical marker, the primary defect shape was transferred to the donor site. The epidermal graft was then harvested.  The skin graft was then placed in the primary defect and oriented appropriately. Complex Repair And W Plasty Text: The defect edges were debeveled with a #15 scalpel blade.  The primary defect was closed partially with a complex linear closure.  Given the location of the remaining defect, shape of the defect and the proximity to free margins a W plasty was deemed most appropriate for complete closure of the defect.  Using a sterile surgical marker, an appropriate advancement flap was drawn incorporating the defect and placing the expected incisions within the relaxed skin tension lines where possible.    The area thus outlined was incised deep to adipose tissue with a #15 scalpel blade.  The skin margins were undermined to an appropriate distance in all directions utilizing iris scissors. Consent was obtained from the patient. The risks and benefits to therapy were discussed in detail. Specifically, the risks of infection, scarring, bleeding, prolonged wound healing, incomplete removal, allergy to anesthesia, nerve injury and recurrence were addressed. Prior to the procedure, the treatment site was clearly identified and confirmed by the patient. All components of Universal Protocol/PAUSE Rule completed. H Plasty Text: Given the location of the defect, shape of the defect and the proximity to free margins a H-plasty was deemed most appropriate for repair.  Using a sterile surgical marker, the appropriate advancement arms of the H-plasty were drawn incorporating the defect and placing the expected incisions within the relaxed skin tension lines where possible. The area thus outlined was incised deep to adipose tissue with a #15 scalpel blade. The skin margins were undermined to an appropriate distance in all directions utilizing iris scissors.  The opposing advancement arms were then advanced into place in opposite direction and anchored with interrupted buried subcutaneous sutures. Crescentic Advancement Flap Text: The defect edges were debeveled with a #15 scalpel blade.  Given the location of the defect and the proximity to free margins a crescentic advancement flap was deemed most appropriate.  Using a sterile surgical marker, the appropriate advancement flap was drawn incorporating the defect and placing the expected incisions within the relaxed skin tension lines where possible.    The area thus outlined was incised deep to adipose tissue with a #15 scalpel blade.  The skin margins were undermined to an appropriate distance in all directions utilizing iris scissors. Modified Advancement Flap Text: The defect edges were debeveled with a #15 scalpel blade.  Given the location of the defect, shape of the defect and the proximity to free margins a modified advancement flap was deemed most appropriate.  Using a sterile surgical marker, an appropriate advancement flap was drawn incorporating the defect and placing the expected incisions within the relaxed skin tension lines where possible.    The area thus outlined was incised deep to adipose tissue with a #15 scalpel blade.  The skin margins were undermined to an appropriate distance in all directions utilizing iris scissors. Mustarde Flap Text: The defect edges were debeveled with a #15 scalpel blade.  Given the size, depth and location of the defect and the proximity to free margins a Mustarde flap was deemed most appropriate.  Using a sterile surgical marker, an appropriate flap was drawn incorporating the defect. The area thus outlined was incised with a #15 scalpel blade.  The skin margins were undermined to an appropriate distance in all directions utilizing iris scissors. Complex Repair And Xenograft Text: The defect edges were debeveled with a #15 scalpel blade.  The primary defect was closed partially with a complex linear closure.  Given the location of the defect, shape of the defect and the proximity to free margins a xenograft was deemed most appropriate to repair the remaining defect.  The graft was trimmed to fit the size of the remaining defect.  The graft was then placed in the primary defect, oriented appropriately, and sutured into place. No Repair - Repaired With Adjacent Surgical Defect Text (Leave Blank If You Do Not Want): After the excision the defect was repaired concurrently with another surgical defect which was in close approximation. Helical Rim Advancement Flap Text: The defect edges were debeveled with a #15 blade scalpel.  Given the location of the defect and the proximity to free margins (helical rim) a double helical rim advancement flap was deemed most appropriate.  Using a sterile surgical marker, the appropriate advancement flaps were drawn incorporating the defect and placing the expected incisions between the helical rim and antihelix where possible.  The area thus outlined was incised through and through with a #15 scalpel blade.  With a skin hook and iris scissors, the flaps were gently and sharply undermined and freed up. Complex Repair And Melolabial Flap Text: The defect edges were debeveled with a #15 scalpel blade.  The primary defect was closed partially with a complex linear closure.  Given the location of the remaining defect, shape of the defect and the proximity to free margins a melolabial flap was deemed most appropriate for complete closure of the defect.  Using a sterile surgical marker, an appropriate advancement flap was drawn incorporating the defect and placing the expected incisions within the relaxed skin tension lines where possible.    The area thus outlined was incised deep to adipose tissue with a #15 scalpel blade.  The skin margins were undermined to an appropriate distance in all directions utilizing iris scissors. Melolabial Interpolation Flap Text: A decision was made to reconstruct the defect utilizing an interpolation axial flap and a staged reconstruction.  A telfa template was made of the defect.  This telfa template was then used to outline the melolabial interpolation flap.  The donor area for the pedicle flap was then injected with anesthesia.  The flap was excised through the skin and subcutaneous tissue down to the layer of the underlying musculature.  The pedicle flap was carefully excised within this deep plane to maintain its blood supply.  The edges of the donor site were undermined.   The donor site was closed in a primary fashion.  The pedicle was then rotated into position and sutured.  Once the tube was sutured into place, adequate blood supply was confirmed with blanching and refill.  The pedicle was then wrapped with xeroform gauze and dressed appropriately with a telfa and gauze bandage to ensure continued blood supply and protect the attached pedicle. Spiral Flap Text: The defect edges were debeveled with a #15 scalpel blade.  Given the location of the defect, shape of the defect and the proximity to free margins a spiral flap was deemed most appropriate.  Using a sterile surgical marker, an appropriate rotation flap was drawn incorporating the defect and placing the expected incisions within the relaxed skin tension lines where possible. The area thus outlined was incised deep to adipose tissue with a #15 scalpel blade.  The skin margins were undermined to an appropriate distance in all directions utilizing iris scissors. Suture Removal: 14 days Home Suture Removal Text: Patient was provided a home suture removal kit and will remove their sutures at home.  If they have any questions or difficulties they will call the office. Hemigard Intro: Due to skin fragility and wound tension, it was decided to use HEMIGARD adhesive retention suture devices to permit a linear closure. The skin was cleaned and dried for a 6cm distance away from the wound. Excessive hair, if present, was removed to allow for adhesion. Complex Repair And Dorsal Nasal Flap Text: The defect edges were debeveled with a #15 scalpel blade.  The primary defect was closed partially with a complex linear closure.  Given the location of the remaining defect, shape of the defect and the proximity to free margins a dorsal nasal flap was deemed most appropriate for complete closure of the defect.  Using a sterile surgical marker, an appropriate flap was drawn incorporating the defect and placing the expected incisions within the relaxed skin tension lines where possible.    The area thus outlined was incised deep to adipose tissue with a #15 scalpel blade.  The skin margins were undermined to an appropriate distance in all directions utilizing iris scissors. Double Island Pedicle Flap Text: The defect edges were debeveled with a #15 scalpel blade.  Given the location of the defect, shape of the defect and the proximity to free margins a double island pedicle advancement flap was deemed most appropriate.  Using a sterile surgical marker, an appropriate advancement flap was drawn incorporating the defect, outlining the appropriate donor tissue and placing the expected incisions within the relaxed skin tension lines where possible.    The area thus outlined was incised deep to adipose tissue with a #15 scalpel blade.  The skin margins were undermined to an appropriate distance in all directions around the primary defect and laterally outward around the island pedicle utilizing iris scissors.  There was minimal undermining beneath the pedicle flap. Eliptical Excision Additional Text (Leave Blank If You Do Not Want): The margin was drawn around the clinically apparent lesion.  An elliptical shape was then drawn on the skin incorporating the lesion and margins.  Incisions were then made along these lines to the appropriate tissue plane and the lesion was extirpated. Nasal Turnover Hinge Flap Text: The defect edges were debeveled with a #15 scalpel blade.  Given the size, depth, location of the defect and the defect being full thickness a nasal turnover hinge flap was deemed most appropriate.  Using a sterile surgical marker, an appropriate hinge flap was drawn incorporating the defect. The area thus outlined was incised with a #15 scalpel blade. The flap was designed to recreate the nasal mucosal lining and the alar rim. The skin margins were undermined to an appropriate distance in all directions utilizing iris scissors. Z Plasty Text: The lesion was extirpated to the level of the fat with a #15 scalpel blade.  Given the location of the defect, shape of the defect and the proximity to free margins a Z-plasty was deemed most appropriate for repair.  Using a sterile surgical marker, the appropriate transposition arms of the Z-plasty were drawn incorporating the defect and placing the expected incisions within the relaxed skin tension lines where possible.    The area thus outlined was incised deep to adipose tissue with a #15 scalpel blade.  The skin margins were undermined to an appropriate distance in all directions utilizing iris scissors.  The opposing transposition arms were then transposed into place in opposite direction and anchored with interrupted buried subcutaneous sutures. Excision Method: Round O-T Advancement Flap Text: The defect edges were debeveled with a #15 scalpel blade.  Given the location of the defect, shape of the defect and the proximity to free margins an O-T advancement flap was deemed most appropriate.  Using a sterile surgical marker, an appropriate advancement flap was drawn incorporating the defect and placing the expected incisions within the relaxed skin tension lines where possible.    The area thus outlined was incised deep to adipose tissue with a #15 scalpel blade.  The skin margins were undermined to an appropriate distance in all directions utilizing iris scissors. Peng Advancement Flap Text: The defect edges were debeveled with a #15 scalpel blade.  Given the location of the defect, shape of the defect and the proximity to free margins a Peng advancement flap was deemed most appropriate.  Using a sterile surgical marker, an appropriate advancement flap was drawn incorporating the defect and placing the expected incisions within the relaxed skin tension lines where possible. The area thus outlined was incised deep to adipose tissue with a #15 scalpel blade.  The skin margins were undermined to an appropriate distance in all directions utilizing iris scissors. Tissue Cultured Epidermal Autograft Text: The defect edges were debeveled with a #15 scalpel blade.  Given the location of the defect, shape of the defect and the proximity to free margins a tissue cultured epidermal autograft was deemed most appropriate.  The graft was then trimmed to fit the size of the defect.  The graft was then placed in the primary defect and oriented appropriately. Complex Repair And A-T Advancement Flap Text: The defect edges were debeveled with a #15 scalpel blade.  The primary defect was closed partially with a complex linear closure.  Given the location of the remaining defect, shape of the defect and the proximity to free margins an A-T advancement flap was deemed most appropriate for complete closure of the defect.  Using a sterile surgical marker, an appropriate advancement flap was drawn incorporating the defect and placing the expected incisions within the relaxed skin tension lines where possible.    The area thus outlined was incised deep to adipose tissue with a #15 scalpel blade.  The skin margins were undermined to an appropriate distance in all directions utilizing iris scissors. Complex Repair And Rhombic Flap Text: The defect edges were debeveled with a #15 scalpel blade.  The primary defect was closed partially with a complex linear closure.  Given the location of the remaining defect, shape of the defect and the proximity to free margins a rhombic flap was deemed most appropriate for complete closure of the defect.  Using a sterile surgical marker, an appropriate advancement flap was drawn incorporating the defect and placing the expected incisions within the relaxed skin tension lines where possible.    The area thus outlined was incised deep to adipose tissue with a #15 scalpel blade.  The skin margins were undermined to an appropriate distance in all directions utilizing iris scissors. Ftsg Text: The defect edges were debeveled with a #15 scalpel blade.  Given the location of the defect, shape of the defect and the proximity to free margins a full thickness skin graft was deemed most appropriate.  Using a sterile surgical marker, the primary defect shape was transferred to the donor site. The area thus outlined was incised deep to adipose tissue with a #15 scalpel blade.  The harvested graft was then trimmed of adipose tissue until only dermis and epidermis was left.  The skin margins of the secondary defect were undermined to an appropriate distance in all directions utilizing iris scissors.  The secondary defect was closed with interrupted buried subcutaneous sutures.  The skin edges were then re-apposed with running  sutures.  The skin graft was then placed in the primary defect and oriented appropriately. Epidermal Closure: running Ear Star Wedge Flap Text: The defect edges were debeveled with a #15 blade scalpel.  Given the location of the defect and the proximity to free margins (helical rim) an ear star wedge flap was deemed most appropriate.  Using a sterile surgical marker, the appropriate flap was drawn incorporating the defect and placing the expected incisions between the helical rim and antihelix where possible.  The area thus outlined was incised through and through with a #15 scalpel blade. Posterior Auricular Interpolation Flap Text: A decision was made to reconstruct the defect utilizing an interpolation axial flap and a staged reconstruction.  A telfa template was made of the defect.  This telfa template was then used to outline the posterior auricular interpolation flap.  The donor area for the pedicle flap was then injected with anesthesia.  The flap was excised through the skin and subcutaneous tissue down to the layer of the underlying musculature.  The pedicle flap was carefully excised within this deep plane to maintain its blood supply.  The edges of the donor site were undermined.   The donor site was closed in a primary fashion.  The pedicle was then rotated into position and sutured.  Once the tube was sutured into place, adequate blood supply was confirmed with blanching and refill.  The pedicle was then wrapped with xeroform gauze and dressed appropriately with a telfa and gauze bandage to ensure continued blood supply and protect the attached pedicle. Debridement Text: The wound edges were debrided prior to proceeding with the closure to facilitate wound healing.

## 2023-06-16 ENCOUNTER — APPOINTMENT (OUTPATIENT)
Dept: PAIN MANAGEMENT | Facility: CLINIC | Age: 88
End: 2023-06-16

## 2023-06-16 ENCOUNTER — APPOINTMENT (OUTPATIENT)
Dept: PAIN MANAGEMENT | Facility: CLINIC | Age: 88
End: 2023-06-16
Payer: MEDICARE

## 2023-06-16 PROCEDURE — 62323 NJX INTERLAMINAR LMBR/SAC: CPT

## 2023-06-16 PROCEDURE — 93040 RHYTHM ECG WITH REPORT: CPT | Mod: 79

## 2023-06-16 PROCEDURE — 93770 DETERMINATION VENOUS PRESS: CPT

## 2023-06-16 PROCEDURE — 94761 N-INVAS EAR/PLS OXIMETRY MLT: CPT

## 2023-06-16 NOTE — PROCEDURE
[FreeTextEntry3] : CAUDAL EPIDURAL STEROID INJECTION\par \par  \par \par Date:  2023\par \par Patient: Farrukh Oneal\par \par :  1935\par Preoperative Diagnosis: Lumbar Radiculopathy\par \par Postoperative Diagnosis: Lumbar Radiculopathy\par \par \par \par Procedure: Caudal epidural injection under fluoroscopic guidance\par \par \par \par Physician: Alison Campa [facility], [facility]\par \par \par \par Anesthesia: See Nurses notes. Local.\par \par \par \par Medical Necessity:  Failure of conservative management.\par \par \par \par Indication for Fluoroscopy:  This procedure requires the precise placement of the spinal needle into the epidural space.  It is the only way to accurately and safely perform the injection.\par \par \par \par CONSENT: The possible complications including infection, bleeding, nerve damage, hospital admission or failure of the procedure; though unusual, are theoretically possible. The patient was educated about the of the procedure and alternative therapies. All questions were answered and the patient freely gave consent to proceed.\par \par \par \par Monitoring:  Patient had continuous blood pressure, EKG, and pulse oximetry throughout the case. See nurse's notes.\par \par  \par \par PROCEDURE NOTE:\par \par After obtaining written consent, the patient was placed in the prone position with a pillow under the pelvis. Multiple fluoroscopic views of the sacrum-AP & Lateral- were obtained. The lower back and upper gluteal region were prepped with betadine and draped in the sterile fashion. A time out was performed.  The skin over the sacral hiatus was infiltrated with local anesthetic and a 18 gauge 3 ½ inch needle was inserted. The angle of the needle was lowered until it was felt to penetrate the sacrococcygeal ligament at which time the needle was advanced without resistance. Fluoroscopy confirmed the position of the needle within the caudal space. Omnipaque 240, 3 cc was injected to confirm an appropriate epidural spread. A total of 9ml of preservative-free sterile saline, 1 ml of depomedrol (80mg/cc) was injected via the needle into the caudal space.  The needle was cleared with three ml preservative-free normal saline.  There was no evidence of CSF or heme. The needle was removed intact. A band aid was place on the site.\par \par  \par \par Epiduragram Report: A spinal needle is in place in the caudal epidural space.  Central epidural spread of dye is noted from the lower sacral segments to L4-5.    Dye is seen outlining the sacral nerve roots bilaterally as they emerge from their respective foramen without obstruction pointing away from adhesion/fibrosis\par \par  \par Complications: None. The patient tolerated the procedure well. \par \par  \par \par Disposition: I have examined the patient and there are no new physical findings since the original presentation.  Sensory and motor function were intact. The patient met discharge criteria see nurses notes. The discharge instruction sheet was reviewed and given to the patient. The patient was discharged home with a . If patient gets sustained relief will have patient do modified planks 3 times a day on carpet or yoga mat starting at 5 seconds building up to 1 minute without grimacing/Valsalva and walking.\par \par  \par \par Comments: 1st caudal SANTA today, depending on effectiveness would schedule a 2nd caudal SANTA in 1-2 weeks or follow up in office depending on insurance. Call if any problems.\par \par \par \par \par This document was electronically signed by: \par \par \par Alison Campa MD, FAAPMR\par \par Diplomate, American Board of Physical Medicine and Rehabilitation\par \par Diplomate, American Board of Pain Medicine\par \par \par \par

## 2023-06-26 ENCOUNTER — APPOINTMENT (OUTPATIENT)
Dept: CARDIOLOGY | Facility: CLINIC | Age: 88
End: 2023-06-26
Payer: MEDICARE

## 2023-06-26 PROCEDURE — 93923 UPR/LXTR ART STDY 3+ LVLS: CPT

## 2023-06-26 PROCEDURE — 93925 LOWER EXTREMITY STUDY: CPT

## 2023-07-01 ENCOUNTER — INPATIENT (INPATIENT)
Facility: HOSPITAL | Age: 88
LOS: 0 days | Discharge: HOME | DRG: 392 | End: 2023-07-01
Attending: INTERNAL MEDICINE | Admitting: INTERNAL MEDICINE
Payer: MEDICARE

## 2023-07-01 ENCOUNTER — TRANSCRIPTION ENCOUNTER (OUTPATIENT)
Age: 88
End: 2023-07-01

## 2023-07-01 VITALS
OXYGEN SATURATION: 95 % | DIASTOLIC BLOOD PRESSURE: 55 MMHG | TEMPERATURE: 97 F | HEART RATE: 65 BPM | SYSTOLIC BLOOD PRESSURE: 104 MMHG | RESPIRATION RATE: 18 BRPM

## 2023-07-01 VITALS — OXYGEN SATURATION: 97 % | RESPIRATION RATE: 19 BRPM

## 2023-07-01 DIAGNOSIS — I95.9 HYPOTENSION, UNSPECIFIED: ICD-10-CM

## 2023-07-01 DIAGNOSIS — Z98.890 OTHER SPECIFIED POSTPROCEDURAL STATES: Chronic | ICD-10-CM

## 2023-07-01 LAB
ALBUMIN SERPL ELPH-MCNC: 3.9 G/DL — SIGNIFICANT CHANGE UP (ref 3.5–5.2)
ALP SERPL-CCNC: 111 U/L — SIGNIFICANT CHANGE UP (ref 30–115)
ALT FLD-CCNC: 75 U/L — HIGH (ref 0–41)
ANION GAP SERPL CALC-SCNC: 12 MMOL/L — SIGNIFICANT CHANGE UP (ref 7–14)
AST SERPL-CCNC: 26 U/L — SIGNIFICANT CHANGE UP (ref 0–41)
BASOPHILS # BLD AUTO: 0.1 K/UL — SIGNIFICANT CHANGE UP (ref 0–0.2)
BASOPHILS NFR BLD AUTO: 0.8 % — SIGNIFICANT CHANGE UP (ref 0–1)
BILIRUB SERPL-MCNC: 0.4 MG/DL — SIGNIFICANT CHANGE UP (ref 0.2–1.2)
BUN SERPL-MCNC: 22 MG/DL — HIGH (ref 10–20)
CALCIUM SERPL-MCNC: 9.4 MG/DL — SIGNIFICANT CHANGE UP (ref 8.4–10.5)
CHLORIDE SERPL-SCNC: 100 MMOL/L — SIGNIFICANT CHANGE UP (ref 98–110)
CO2 SERPL-SCNC: 23 MMOL/L — SIGNIFICANT CHANGE UP (ref 17–32)
CREAT SERPL-MCNC: 1.3 MG/DL — SIGNIFICANT CHANGE UP (ref 0.7–1.5)
EGFR: 53 ML/MIN/1.73M2 — LOW
EOSINOPHIL # BLD AUTO: 0.46 K/UL — SIGNIFICANT CHANGE UP (ref 0–0.7)
EOSINOPHIL NFR BLD AUTO: 3.7 % — SIGNIFICANT CHANGE UP (ref 0–8)
GLUCOSE SERPL-MCNC: 153 MG/DL — HIGH (ref 70–99)
HCT VFR BLD CALC: 44.2 % — SIGNIFICANT CHANGE UP (ref 42–52)
HGB BLD-MCNC: 15.1 G/DL — SIGNIFICANT CHANGE UP (ref 14–18)
IMM GRANULOCYTES NFR BLD AUTO: 0.7 % — HIGH (ref 0.1–0.3)
LACTATE SERPL-SCNC: 2.3 MMOL/L — HIGH (ref 0.7–2)
LIDOCAIN IGE QN: 45 U/L — SIGNIFICANT CHANGE UP (ref 7–60)
LYMPHOCYTES # BLD AUTO: 2.93 K/UL — SIGNIFICANT CHANGE UP (ref 1.2–3.4)
LYMPHOCYTES # BLD AUTO: 23.6 % — SIGNIFICANT CHANGE UP (ref 20.5–51.1)
MAGNESIUM SERPL-MCNC: 1.9 MG/DL — SIGNIFICANT CHANGE UP (ref 1.8–2.4)
MCHC RBC-ENTMCNC: 31.3 PG — HIGH (ref 27–31)
MCHC RBC-ENTMCNC: 34.2 G/DL — SIGNIFICANT CHANGE UP (ref 32–37)
MCV RBC AUTO: 91.5 FL — SIGNIFICANT CHANGE UP (ref 80–94)
MONOCYTES # BLD AUTO: 1.22 K/UL — HIGH (ref 0.1–0.6)
MONOCYTES NFR BLD AUTO: 9.8 % — HIGH (ref 1.7–9.3)
NEUTROPHILS # BLD AUTO: 7.62 K/UL — HIGH (ref 1.4–6.5)
NEUTROPHILS NFR BLD AUTO: 61.4 % — SIGNIFICANT CHANGE UP (ref 42.2–75.2)
NRBC # BLD: 0 /100 WBCS — SIGNIFICANT CHANGE UP (ref 0–0)
PLATELET # BLD AUTO: 167 K/UL — SIGNIFICANT CHANGE UP (ref 130–400)
PMV BLD: 9.5 FL — SIGNIFICANT CHANGE UP (ref 7.4–10.4)
POTASSIUM SERPL-MCNC: 4.8 MMOL/L — SIGNIFICANT CHANGE UP (ref 3.5–5)
POTASSIUM SERPL-SCNC: 4.8 MMOL/L — SIGNIFICANT CHANGE UP (ref 3.5–5)
PROT SERPL-MCNC: 6.1 G/DL — SIGNIFICANT CHANGE UP (ref 6–8)
RBC # BLD: 4.83 M/UL — SIGNIFICANT CHANGE UP (ref 4.7–6.1)
RBC # FLD: 14.4 % — SIGNIFICANT CHANGE UP (ref 11.5–14.5)
SODIUM SERPL-SCNC: 135 MMOL/L — SIGNIFICANT CHANGE UP (ref 135–146)
TROPONIN T SERPL-MCNC: <0.01 NG/ML — SIGNIFICANT CHANGE UP
WBC # BLD: 12.42 K/UL — HIGH (ref 4.8–10.8)
WBC # FLD AUTO: 12.42 K/UL — HIGH (ref 4.8–10.8)

## 2023-07-01 PROCEDURE — 99285 EMERGENCY DEPT VISIT HI MDM: CPT | Mod: FS

## 2023-07-01 PROCEDURE — 93010 ELECTROCARDIOGRAM REPORT: CPT

## 2023-07-01 PROCEDURE — 87040 BLOOD CULTURE FOR BACTERIA: CPT

## 2023-07-01 PROCEDURE — 71045 X-RAY EXAM CHEST 1 VIEW: CPT | Mod: 26

## 2023-07-01 PROCEDURE — 71275 CT ANGIOGRAPHY CHEST: CPT | Mod: 26,MA

## 2023-07-01 PROCEDURE — 99238 HOSP IP/OBS DSCHRG MGMT 30/<: CPT

## 2023-07-01 RX ORDER — ISOSORBIDE DINITRATE 5 MG/1
0 TABLET ORAL
Qty: 0 | Refills: 0 | DISCHARGE

## 2023-07-01 RX ORDER — LISINOPRIL 2.5 MG/1
1 TABLET ORAL
Qty: 0 | Refills: 0 | DISCHARGE

## 2023-07-01 RX ORDER — FAMOTIDINE 10 MG/ML
1 INJECTION INTRAVENOUS
Refills: 0 | DISCHARGE

## 2023-07-01 RX ORDER — ONDANSETRON 8 MG/1
4 TABLET, FILM COATED ORAL ONCE
Refills: 0 | Status: COMPLETED | OUTPATIENT
Start: 2023-07-01 | End: 2023-07-01

## 2023-07-01 RX ORDER — SODIUM CHLORIDE 9 MG/ML
1000 INJECTION INTRAMUSCULAR; INTRAVENOUS; SUBCUTANEOUS
Refills: 0 | Status: DISCONTINUED | OUTPATIENT
Start: 2023-07-01 | End: 2023-07-01

## 2023-07-01 RX ORDER — TAMSULOSIN HYDROCHLORIDE 0.4 MG/1
1 CAPSULE ORAL
Qty: 0 | Refills: 0 | DISCHARGE

## 2023-07-01 RX ORDER — CEFTRIAXONE 500 MG/1
1000 INJECTION, POWDER, FOR SOLUTION INTRAMUSCULAR; INTRAVENOUS EVERY 24 HOURS
Refills: 0 | Status: DISCONTINUED | OUTPATIENT
Start: 2023-07-01 | End: 2023-07-01

## 2023-07-01 RX ORDER — NICOTINE POLACRILEX 2 MG
1 GUM BUCCAL DAILY
Refills: 0 | Status: DISCONTINUED | OUTPATIENT
Start: 2023-07-01 | End: 2023-07-01

## 2023-07-01 RX ORDER — ACETAMINOPHEN 500 MG
650 TABLET ORAL EVERY 6 HOURS
Refills: 0 | Status: DISCONTINUED | OUTPATIENT
Start: 2023-07-01 | End: 2023-07-01

## 2023-07-01 RX ORDER — ROSUVASTATIN CALCIUM 5 MG/1
1 TABLET ORAL
Qty: 0 | Refills: 0 | DISCHARGE

## 2023-07-01 RX ORDER — DONEPEZIL HYDROCHLORIDE 10 MG/1
1 TABLET, FILM COATED ORAL
Qty: 0 | Refills: 0 | DISCHARGE

## 2023-07-01 RX ORDER — DONEPEZIL HYDROCHLORIDE 10 MG/1
5 TABLET, FILM COATED ORAL AT BEDTIME
Refills: 0 | Status: DISCONTINUED | OUTPATIENT
Start: 2023-07-01 | End: 2023-07-01

## 2023-07-01 RX ORDER — ESCITALOPRAM OXALATE 10 MG/1
1 TABLET, FILM COATED ORAL
Qty: 0 | Refills: 0 | DISCHARGE

## 2023-07-01 RX ORDER — DILTIAZEM HCL 120 MG
240 CAPSULE, EXT RELEASE 24 HR ORAL DAILY
Refills: 0 | Status: DISCONTINUED | OUTPATIENT
Start: 2023-07-01 | End: 2023-07-01

## 2023-07-01 RX ORDER — FAMOTIDINE 10 MG/ML
20 INJECTION INTRAVENOUS DAILY
Refills: 0 | Status: DISCONTINUED | OUTPATIENT
Start: 2023-07-01 | End: 2023-07-01

## 2023-07-01 RX ORDER — SODIUM CHLORIDE 9 MG/ML
500 INJECTION INTRAMUSCULAR; INTRAVENOUS; SUBCUTANEOUS ONCE
Refills: 0 | Status: COMPLETED | OUTPATIENT
Start: 2023-07-01 | End: 2023-07-01

## 2023-07-01 RX ORDER — TAMSULOSIN HYDROCHLORIDE 0.4 MG/1
0.4 CAPSULE ORAL AT BEDTIME
Refills: 0 | Status: DISCONTINUED | OUTPATIENT
Start: 2023-07-01 | End: 2023-07-01

## 2023-07-01 RX ORDER — LISINOPRIL 2.5 MG/1
5 TABLET ORAL EVERY 12 HOURS
Refills: 0 | Status: DISCONTINUED | OUTPATIENT
Start: 2023-07-01 | End: 2023-07-01

## 2023-07-01 RX ORDER — ISOSORBIDE MONONITRATE 60 MG/1
1 TABLET, EXTENDED RELEASE ORAL
Refills: 0 | DISCHARGE

## 2023-07-01 RX ORDER — SODIUM CHLORIDE 9 MG/ML
1000 INJECTION INTRAMUSCULAR; INTRAVENOUS; SUBCUTANEOUS ONCE
Refills: 0 | Status: COMPLETED | OUTPATIENT
Start: 2023-07-01 | End: 2023-07-01

## 2023-07-01 RX ORDER — LISINOPRIL 2.5 MG/1
1 TABLET ORAL
Refills: 0 | DISCHARGE

## 2023-07-01 RX ORDER — FAMOTIDINE 10 MG/ML
1 INJECTION INTRAVENOUS
Qty: 0 | Refills: 0 | DISCHARGE

## 2023-07-01 RX ORDER — ESCITALOPRAM OXALATE 10 MG/1
10 TABLET, FILM COATED ORAL DAILY
Refills: 0 | Status: DISCONTINUED | OUTPATIENT
Start: 2023-07-01 | End: 2023-07-01

## 2023-07-01 RX ORDER — ATORVASTATIN CALCIUM 80 MG/1
80 TABLET, FILM COATED ORAL AT BEDTIME
Refills: 0 | Status: DISCONTINUED | OUTPATIENT
Start: 2023-07-01 | End: 2023-07-01

## 2023-07-01 RX ORDER — ONDANSETRON 8 MG/1
4 TABLET, FILM COATED ORAL EVERY 8 HOURS
Refills: 0 | Status: DISCONTINUED | OUTPATIENT
Start: 2023-07-01 | End: 2023-07-01

## 2023-07-01 RX ORDER — ASPIRIN/CALCIUM CARB/MAGNESIUM 324 MG
81 TABLET ORAL DAILY
Refills: 0 | Status: DISCONTINUED | OUTPATIENT
Start: 2023-07-01 | End: 2023-07-01

## 2023-07-01 RX ORDER — ISOSORBIDE MONONITRATE 60 MG/1
30 TABLET, EXTENDED RELEASE ORAL DAILY
Refills: 0 | Status: DISCONTINUED | OUTPATIENT
Start: 2023-07-01 | End: 2023-07-01

## 2023-07-01 RX ADMIN — ONDANSETRON 4 MILLIGRAM(S): 8 TABLET, FILM COATED ORAL at 03:13

## 2023-07-01 RX ADMIN — SODIUM CHLORIDE 2000 MILLILITER(S): 9 INJECTION INTRAMUSCULAR; INTRAVENOUS; SUBCUTANEOUS at 03:29

## 2023-07-01 RX ADMIN — SODIUM CHLORIDE 1000 MILLILITER(S): 9 INJECTION INTRAMUSCULAR; INTRAVENOUS; SUBCUTANEOUS at 04:00

## 2023-07-01 RX ADMIN — SODIUM CHLORIDE 1000 MILLILITER(S): 9 INJECTION INTRAMUSCULAR; INTRAVENOUS; SUBCUTANEOUS at 07:47

## 2023-07-01 NOTE — ED ADULT NURSE REASSESSMENT NOTE - NS ED NURSE REASSESS COMMENT FT1
As per MD Leung [id number] patient to be discharge at this time. No iv fluid bolus order, IV access removed as per patient's request

## 2023-07-01 NOTE — DISCHARGE NOTE PROVIDER - HOSPITAL COURSE
89 yo M, hx of HTN, HLD, GERD, BPH, chronic LBP, ARCHANA, no known heart disease, renal disease here for assessment of lightheadedness, weakness x 2D.    -Pt states he felt nauseated at home and did not vomit.  Pt had one episode of loose stool in the ED.  Pt currently denies abdominal pain, chest pain, SOB, dysuria, urinary frequency, lightheadedness, palpitations, diaphoresis.  Patient is asking to go home.    Work up reveals no source of infection therefore no Sepsis present on admission, clinically he might have viral gastroenteritis.  Discussed with his fiance Ines that keeping him in the hospital is more risky than discharging him home and advancing his diet slowly with rest and PO fluids.  Ines confirms that patient did not vomit.  Patient and fiance agree to plan.    Patient medically stable for discharge to home.

## 2023-07-01 NOTE — ED PROVIDER NOTE - OBJECTIVE STATEMENT
87 yo M, hx of HTN, HLD, GERD, BPH, no known heart disease, renal disease here for assessment of lightheadedness, weakness and now vomiting. Sx began yesterday while at his daughter's home. She thought maybe he was dehydrated so she gave him water.    He woke up this AM with worsening lightheadedness, described as dizziness, not room spinning, diaphoresis.     Upon arrival in ED had multiple episodes of NBNB vomiting.    No fever, chills, dysuria, hematuria, CP.

## 2023-07-01 NOTE — ED ADULT NURSE REASSESSMENT NOTE - NS ED NURSE REASSESS COMMENT FT1
Report given to MYAH Calvin. Receiving RN made aware, ANNA Richter wants blood cultures and UA/UC prior to administration of antibiotic. Patient does not have to urinate at this time, prescribed IV fluids in place. Patient verbalized back understanding that urine sample is needed.

## 2023-07-01 NOTE — DISCHARGE NOTE PROVIDER - CARE PROVIDER_API CALL
Juarez Girard .  Internal Medicine  [address] [place] [address]  Phone: [phone]  Fax: [phone]  Follow Up Time:

## 2023-07-01 NOTE — ED PROVIDER NOTE - CARE PLAN
Principal Discharge DX:	Hypotension  Secondary Diagnosis:	Weakness  Secondary Diagnosis:	Hypoxia   1

## 2023-07-01 NOTE — DISCHARGE NOTE PROVIDER - NSDCFUSCHEDAPPT_GEN_ALL_CORE_FT
Mono Felton  [facility] Physician [facility]  PULMMED [address]  Scheduled Appointment: 07/11/2023    Alison Campa  [facility]  ONCPAINMGT [address]  Scheduled Appointment: 07/14/2023    Giselle King  [facility]  CARDIOLOGY [address]  Scheduled Appointment: 07/18/2023     Repair Type: Flap

## 2023-07-01 NOTE — ED ADULT NURSE REASSESSMENT NOTE - NS ED NURSE REASSESS COMMENT FT1
ANNA Washington made aware patient had one episode of diarrhea and states "I'd like to go home". As per PA patient to not be discharged at this time, okay to send patient to floor. As per PA okay to give prescribed NS bolus at this time.

## 2023-07-01 NOTE — H&P ADULT - NSHPPHYSICALEXAM_GEN_ALL_CORE
VITALS:   T(C): 36.2 (07-01-23 @ 02:56), Max: 36.2 (07-01-23 @ 02:56)  HR: 63 (07-01-23 @ 06:16) (63 - 66)  BP: 103/50 (07-01-23 @ 06:16) (99/50 - 104/55)  RR: 19 (07-01-23 @ 06:16) (18 - 19)  SpO2: 96% (07-01-23 @ 06:16) (95% - 96%)    GENERAL: NAD, lying in bed comfortably and pleasant   HEAD:  Atraumatic, Normocephalic  EYES: EOMI, conjunctiva and sclera clear  ENT: Moist mucous membranes  NECK: Supple, No JVD  CHEST/LUNG: CTA b/l Unlabored respirations  HEART: Regular rate and rhythm; + murmur; no  rubs, or gallops  ABDOMEN: Bowel sounds present; Soft, Nontender, Nondistended  EXTREMITIES: No clubbing, cyanosis, or edema  NERVOUS SYSTEM:  Alert & Oriented X3, speech clear. No deficits   MSK: FROM all 4 extremities, full and equal strength  SKIN: No rashes or lesions

## 2023-07-01 NOTE — DISCHARGE NOTE NURSING/CASE MANAGEMENT/SOCIAL WORK - NSDCPEFALRISK_GEN_ALL_CORE
For information on Fall & Injury Prevention, visit: [email or web address] OR  [email or web address] OR  https://www.cdc.gov/steadi/patient.html

## 2023-07-01 NOTE — DISCHARGE NOTE NURSING/CASE MANAGEMENT/SOCIAL WORK - PATIENT PORTAL LINK FT
You can access the FollowMyHealth Patient Portal offered by [facility] by registering at the following website: [email or web address]. By joining [employer]’s FollowMyHealth portal, you will also be able to view your health information using other applications (apps) compatible with our system.

## 2023-07-01 NOTE — ED PROVIDER NOTE - PHYSICAL EXAMINATION
VITAL SIGNS: I have reviewed nursing notes and confirm.  CONSTITUTIONAL: Well-developed; well-nourished; in no acute distress.  SKIN: Skin exam is warm, slightly pale, slightly diaphoretic  HEAD: Normocephalic; atraumatic.  EYES: PERRL, EOM intact; conjunctiva and sclera clear.  ENT: No nasal discharge; airway clear  CARD: S1, S2 normal; no murmurs, gallops, or rubs. Regular rate and rhythm.  RESP: No wheezes, rales or rhonchi.  ABD: Normal bowel sounds; soft; non-distended; non-tender  EXT: Normal ROM. No clubbing, cyanosis or edema.  LYMPH: No acute cervical adenopathy.  NEURO: Alert, oriented. Grossly unremarkable. No focal deficits, no nystagmus, appears globally weak, was helped from wheelchair to bed.   PSYCH: Cooperative, appropriate.

## 2023-07-01 NOTE — H&P ADULT - NS ATTEND AMEND GEN_ALL_CORE FT
Patient seen while in the ER, agree with assessment and plan as outlined Patient seen while in the ER, looks very comfortable and says he feels well. Agree with assessment and plan as outlined

## 2023-07-01 NOTE — H&P ADULT - HISTORY OF PRESENT ILLNESS
87 yo M, hx of HTN, HLD, GERD, BPH, no known heart disease, renal disease here for assessment of lightheadedness, weakness and now vomiting. Sx began yesterday while at his daughter's home. She thought maybe he was dehydrated so she gave him water. He woke up this AM with worsening lightheadedness, described as dizziness, not room spinning, diaphoresis. Upon arrival in ED had multiple episodes of NBNB vomiting. No fever, chills, dysuria, hematuria, CP. 89 yo M, hx of HTN, HLD, GERD, BPH, chronic LBP, ARCHANA, no known heart disease, renal disease here for assessment of lightheadedness, weakness x 2D.  and vomiting  in ED. Sx worsened yesterday while at his daughter's home. She thought maybe he was dehydrated so she gave him water. He woke up this AM with worsening lightheadedness, described as dizziness, not room spinning, diaphoresis. Pt family by bedside stated pt has been sleeping a lot lately as well. States she took his BP last night and it was 110/48. Upon arrival in ED had a single episode of NBNB vomiting. Denies fever, chills, dysuria, hematuria, CP, SOB, abd pain, HA, or current vomiting.

## 2023-07-01 NOTE — H&P ADULT - ASSESSMENT
89 yo M, hx of HTN, HLD, GERD, BPH, chronic LBP, ARCHANA, no known heart disease, renal disease here for assessment of lightheadedness, weakness x 2D.    # Weakness  # Hypotension  # Elevated lactate  - Chest CT neg for PE  - IVF  - monitor VS  - orthostatics  - PT consult  - ambulate with assistance and as tolerated  - fall risk  - pulmonary consult  - zofran PRN  - consider CLD if vomiting again  - trend lactate  - procal  - repeat labs 89 yo M, hx of HTN, HLD, GERD, BPH, chronic LBP, ARCHANA, no known heart disease, renal disease here for assessment of lightheadedness, weakness x 2D.    # Weakness  # Hypotension  # Elevated lactate  - Chest CT neg for PE  - IVF  - monitor VS  - orthostatics  - PT consult  - ambulate with assistance and as tolerated  - fall risk  - pulmonary consult  - zofran PRN  - consider CLD if vomiting again  - trend lactate  - rocephin one dose  - ID consult  - procal  - repeat labs    # HTN  - monitor VS  - hold home BP meds at this time, pt hypotensive  - bolus 500cc x1    # HDL  - c/w statin    # BPH  - c/w flomax 87 yo M, hx of HTN, HLD, GERD, BPH, chronic LBP, ARCHANA, no known heart disease, renal disease here for assessment of lightheadedness, weakness x 2D.    # Weakness  # Hypotension  # Elevated lactate  - Chest CT neg for PE  - IVF  - monitor VS  - orthostatics  - PT consult  - ambulate with assistance and as tolerated  - fall risk  - pulmonary consult  - zofran PRN  - consider CLD if vomiting again  - trend lactate  - rocephin   - blood culture  - UA  - urine culture  - ID consult  - procal  - repeat labs    # Nicotine dependency  - nicotine patch     # HTN  - monitor VS  - hold home BP meds at this time, pt hypotensive  - bolus 500cc x1    # HDL  - c/w statin    # BPH  - c/w flomax

## 2023-07-01 NOTE — DISCHARGE NOTE PROVIDER - NSDCCPCAREPLAN_GEN_ALL_CORE_FT
PRINCIPAL DISCHARGE DIAGNOSIS  Diagnosis: Nausea  Assessment and Plan of Treatment: Your symptoms could be due to a viral gastrointestinal illness.  Please rest, eat light foods and drink plenty of fluids.  Do not do anything strenuous until you feel better.  Your symptoms should resolve in a few days.

## 2023-07-01 NOTE — H&P ADULT - NSHPLABSRESULTS_GEN_ALL_CORE
LABS:  cret                        15.1   12.42 )-----------( 167      ( 01 Jul 2023 02:53 )             44.2     07-01    135  |  100  |  22<H>  ----------------------------<  153<H>  4.8   |  23  |  1.3    Ca    9.4      01 Jul 2023 02:53  Mg     1.9     07-01    TPro  6.1  /  Alb  3.9  /  TBili  0.4  /  DBili  x   /  AST  26  /  ALT  75<H>  /  AlkPhos  111  07-01 LABS:  cret                        15.1   12.42 )-----------( 167      ( 01 Jul 2023 02:53 )             44.2     07-01    135  |  100  |  22<H>  ----------------------------<  153<H>  4.8   |  23  |  1.3    Ca    9.4      01 Jul 2023 02:53  Mg     1.9     07-01    TPro  6.1  /  Alb  3.9  /  TBili  0.4  /  DBili  x   /  AST  26  /  ALT  75<H>  /  AlkPhos  111  07-01      RADIOLOGY:    CT Angio Chest PE Protocol w/ IV Cont (07.01.23 @ 04:13)     IMPRESSION:  1.  No evidence of acute pulmonary embolus or acute thoracic pathology. Patient with fevers, scrotal swelling, +UTI.  VSS in ER.  Labs, US, CT findings reviewed, patient on empiric abx, will need urology consult, can have green change on admission.  Patient is to be admitted to the hospital and the case was discussed with the admitting physician.  Any changes in plan, additional imaging/labs, and further work up will be at the discretion of the admitting physician. Per discussion with admitting physician, all necessary consults for admitted patients to be obtained by morning team unless patient requires emergent intervention or medical advice by specialist overnight.

## 2023-07-01 NOTE — ED PROVIDER NOTE - CLINICAL SUMMARY MEDICAL DECISION MAKING FREE TEXT BOX
Patient with borderline hypotension despite hydration, new hypoxia requiring supplemental O2 -- both of unclear etiology at this time. Will need admission for further monitoring.

## 2023-07-01 NOTE — ED ADULT NURSE NOTE - NSFALLHARMRISKINTERV_ED_ALL_ED

## 2023-07-06 LAB
CULTURE RESULTS: SIGNIFICANT CHANGE UP
SPECIMEN SOURCE: SIGNIFICANT CHANGE UP

## 2023-07-06 NOTE — PRE-OP CHECKLIST - VERIFY SURGICAL SITE/SIDE WITH PATIENT
Pharmacokinetic Initial Assessment: IV Vancomycin    Assessment/Plan:    Initiate intravenous vancomycin with loading dose of 2000 (1000mg x2) mg once followed by a maintenance dose of vancomycin 1500 mg IV every 12 hours  Desired empiric serum trough concentration is 15 to 20 mcg/mL  Draw vancomycin trough level 60 min prior to fourth dose on 07/07 at approximately 2300  Pharmacy will continue to follow and monitor vancomycin.      Please contact pharmacy at extension [phone] with any questions regarding this assessment.     Thank you for the consult,   Suman Branch       Patient brief summary:  Oscar iMlan is a 18 y.o. male initiated on antimicrobial therapy with IV Vancomycin for treatment of suspected bone/joint infection    Drug Allergies:   Review of patient's allergies indicates:  No Known Allergies    Actual Body Weight:   79.4 kg    Renal Function:   Estimated Creatinine Clearance: 158.1 mL/min (based on SCr of 0.75 mg/dL).,     Dialysis Method (if applicable):  N/A    CBC (last 72 hours):  Recent Labs   Lab Result Units 07/05/23 2005 07/06/23  1024   WBC x10(3)/mcL 16.62* 15.73*   Hgb g/dL 13.6* 14.6   Hct % 41.0* 44.7   Platelet x10(3)/mcL 236 226   Mono % % 8.7 9.2   Eos % % 0.4 0.9   Basophil % % 0.4 0.3       Metabolic Panel (last 72 hours):  Recent Labs   Lab Result Units 07/05/23 2005 07/06/23  1024   Sodium Level mmol/L 135* 135*   Potassium Level mmol/L 3.8 4.1   Chloride mmol/L 104 107   Carbon Dioxide mmol/L 22 22   Glucose Level mg/dL 107* 102*   Blood Urea Nitrogen mg/dL 10.8 9.9   Creatinine mg/dL 0.80 0.75   Albumin Level g/dL 4.3 4.2   Bilirubin Total mg/dL 0.4 0.6   Alkaline Phosphatase unit/L 102 106   Aspartate Aminotransferase unit/L 16 15   Alanine Aminotransferase unit/L 17 14       Drug levels (last 3 results):  No results for input(s): VANCOMYCINRA, VANCORANDOM, VANCOMYCINPE, VANCOPEAK, VANCOMYCINTR, VANCOTROUGH in the last 72 hours.    Microbiologic Results:  Microbiology  Results (last 7 days)       Procedure Component Value Units Date/Time    Blood Culture #2 **CANNOT BE ORDERED STAT** [[id number]] Collected: 07/06/23 1024    Order Status: Resulted Specimen: Blood Updated: 07/06/23 1113    Blood Culture #1 **CANNOT BE ORDERED STAT** [[id number]] Collected: 07/06/23 1024    Order Status: Resulted Specimen: Blood Updated: 07/06/23 1113             done

## 2023-07-12 DIAGNOSIS — N40.0 BENIGN PROSTATIC HYPERPLASIA WITHOUT LOWER URINARY TRACT SYMPTOMS: ICD-10-CM

## 2023-07-12 DIAGNOSIS — A08.4 VIRAL INTESTINAL INFECTION, UNSPECIFIED: ICD-10-CM

## 2023-07-12 DIAGNOSIS — Z79.82 LONG TERM (CURRENT) USE OF ASPIRIN: ICD-10-CM

## 2023-07-12 DIAGNOSIS — E78.5 HYPERLIPIDEMIA, UNSPECIFIED: ICD-10-CM

## 2023-07-12 DIAGNOSIS — R74.02 ELEVATION OF LEVELS OF LACTIC ACID DEHYDROGENASE [LDH]: ICD-10-CM

## 2023-07-12 DIAGNOSIS — K21.9 GASTRO-ESOPHAGEAL REFLUX DISEASE WITHOUT ESOPHAGITIS: ICD-10-CM

## 2023-07-12 DIAGNOSIS — F17.200 NICOTINE DEPENDENCE, UNSPECIFIED, UNCOMPLICATED: ICD-10-CM

## 2023-07-12 DIAGNOSIS — I10 ESSENTIAL (PRIMARY) HYPERTENSION: ICD-10-CM

## 2023-07-12 DIAGNOSIS — G47.33 OBSTRUCTIVE SLEEP APNEA (ADULT) (PEDIATRIC): ICD-10-CM

## 2023-07-12 DIAGNOSIS — I95.9 HYPOTENSION, UNSPECIFIED: ICD-10-CM

## 2023-07-14 ENCOUNTER — APPOINTMENT (OUTPATIENT)
Dept: PAIN MANAGEMENT | Facility: CLINIC | Age: 88
End: 2023-07-14

## 2023-07-14 ENCOUNTER — APPOINTMENT (OUTPATIENT)
Dept: PAIN MANAGEMENT | Facility: CLINIC | Age: 88
End: 2023-07-14
Payer: MEDICARE

## 2023-07-14 PROCEDURE — 99213 OFFICE O/P EST LOW 20 MIN: CPT

## 2023-07-14 NOTE — ASSESSMENT
[FreeTextEntry1] : This is a 88 year old male with mostly localized lower back pain with occasional radicular features into the bilateral lower extremities. Previous injection therapy provided him with moderate relief of his symptoms. Imaging studies along with physical exam findings correlate symptomatology. He is status post 1 caudal SANTA x1 w/ mac on 6/16/2023 and reports 100% relief.  Given this, we will not repeat a second injection at this time.  He was instructed to contact us should his pain increase at which time we will reevaluate for appropriateness for repeating the injection at that time.  All this patients questions were answered and the conversation was understood well.\par \par \par Thank you for allowing me to assist in the management of this patient. \par \par \par Best Regards, \par \par \par Alison Campa M.D., [facility]\par \par \par Diplomate, American Board of Physical Medicine and Rehabilitation\par Diplomate, American Board of Pain Medicine \par Diplomate, American Board of Pain Management\par

## 2023-07-14 NOTE — REASON FOR VISIT
[Family Member] : family member [FreeTextEntry2] : Follow up after injection [Initial Visit] : an initial pain management visit

## 2023-07-14 NOTE — PHYSICAL EXAM
[Normal Coordination] : normal coordination [Normal DTR UE/LE] : normal DTR UE/LE  [Normal Sensation] : normal sensation [Normal Mood and Affect] : normal mood and affect [Orientated] : orientated [Able to Communicate] : able to communicate [Well Developed] : well developed [Well Nourished] : well nourished [de-identified] : Oriented to self, but not to time [] : uses walker

## 2023-07-14 NOTE — HISTORY OF PRESENT ILLNESS
[FreeTextEntry1] : ORIGINAL PRESENTATION: This is an 88 year old male here to establish care for lower back pain with radicular features into the bilateral lower extremities. He is here with a family member. He states that there is only radicular pain on occasion. He has a prior history of sciatic pain. He was previously under the care of another pain management provider in [place] who performed injections which provided him with moderate relief of his symptoms. Patient does not have records available for review and is unsure of the specific type of injection performed. The most recent injection was approximately 6 months ago. He attends PT sessions for symptom management with minimal relief. Pain is increased when ambulating and he states it feels like pulled muscles. He trials Tramadol sporadically which he does not believe provides him relief. There is no prior imaging for review today. \par \par Of note, patient is under the care of a neurologist for difficulty with memory. \par \par The patient has had this pain for 5 years. The pain started after no event Patient describes pain as moderate to severe nearly constant. During the last month the pain has been worse during the and no typical pattern. Patient has weakness in the lower extremities. Pain is increased with standing, sitting, walking. Pain is decreased with lying down. Pain is not changed with relaxation, coughing/sneezing, bowel movements. Bowel or bladder habits have not changed.\par \par ACTIVITIES: Patient could walk 1 blocks before the pain starts. Patient can sit 8 hours before pain starts. Patient can stand 15 minutes before pain starts. The patient often lays down because of pain. Patient uses walker at this time. Patient has difficulty performing household chores at this time.\par \par PRIOR PAIN TREATMENTS: No relief with physical therapy.\par \par PRIOR PAIN MEDICATIONS:  Tramadol, Tylenol, Toradol.\par \par PATIENT PRESENTS FOR FOLLOW UP: He is under our care for lower back pain with radicular features into the bilateral lower extremities. He states the pain continues from the spine to the feet. MRI of the lumbar spine was reviewed with the patient today and is documented below. He has been attending PT sessions with no relief of his symptoms.  He is status post 1 caudal epidural steroid injection on 6/16/2023 and was scheduled for a repeat injection today however he has 100% pain relief and has no pain today.  Given this we will not repeat the injection.  I explained to the patient that there is always a risk of aggravating his pain if we would repeat it.  He voices understanding and he was instructed to call us should his pain return

## 2023-07-18 ENCOUNTER — APPOINTMENT (OUTPATIENT)
Dept: CARDIOLOGY | Facility: CLINIC | Age: 88
End: 2023-07-18
Payer: MEDICARE

## 2023-07-18 VITALS — BODY MASS INDEX: 24.17 KG/M2 | HEIGHT: 67 IN | OXYGEN SATURATION: 97 % | WEIGHT: 154 LBS | HEART RATE: 92 BPM

## 2023-07-18 VITALS — DIASTOLIC BLOOD PRESSURE: 60 MMHG | SYSTOLIC BLOOD PRESSURE: 108 MMHG

## 2023-07-18 DIAGNOSIS — Z72.0 TOBACCO USE: ICD-10-CM

## 2023-07-18 PROCEDURE — 99214 OFFICE O/P EST MOD 30 MIN: CPT

## 2023-07-19 PROBLEM — Z72.0 TOBACCO USE: Status: ACTIVE | Noted: 2023-02-01

## 2023-07-19 NOTE — CARDIOLOGY SUMMARY
[de-identified] : EKG 2/1/23 sinus yane 53 bpm, RBBB, inferior infarct \par EKG per Dr. Ha 12/22 sinus yane 52 bpm, iRBBB, NS ST T abn [de-identified] : TTE 6/2022 per Dr. Ha , mild AS AV peak gradient 13 mm Hg, AV mean gradient 5 mm Hg, CHRISTINA 2 cm,  mild to mod AI, mild MR< mild TR\par TTE 4/3/23 normal biV sys function, EF 69%, mild AS, mild to mod AI, mod TR [de-identified] : Pharm NST 1/2019 no ischemia, EF 67% [de-identified] : Carotid US 6/2021 <50% ICA stenosis b/l \par AA US 1/2010 ectatic 2.9 cm

## 2023-07-19 NOTE — ASSESSMENT
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

## 2023-07-19 NOTE — HISTORY OF PRESENT ILLNESS
[FreeTextEntry1] : 88M with HTN, HLD, urinary UTI, non-obstructive CAD, mod TR, mild-mod AS/AI, active smoker here for follow-up.\par \par 7/18/23\par Hospitalized 7/1/23 for lightheadedness and weakness x2 days, thought to have viral gastroenteritis \par 7/1 labs Hgb 15.1, WBC 12, Cr 1.3, K 4.8, AST 26, ALT 75, lipase 45, trop neg x1\par \par Feels better. BP running on low side. Continues to smoke. \par \par 4/25/23\par BERRY with a few steps. Sleep study and PFTs next month. \par \par +L>R leg pain with ambulation. No CP, palpitations, LE edema, wounds. \par \par [place] in August \par \par Trying to cut down on smoking\par \par 2/1/23\par He denies symptoms today. Hard of hearing - forgot hearing aids at home. His partner reports BERRY with 1/8 block. Denies CP, palpitations, claudication. +Dizziness when standing up to quickly. +hip pain/arthritis \par \par Tobacco use for 50+ years \par \par PCP: Dr. Girard\par Previous cardiologist is Dr. Ha\par \par \par

## 2023-07-19 NOTE — PROCEDURE
[FreeTextEntry1] : [facility] 2/2020 LM luminal\par mid LAD 40%\par LCx minimal \par RCA mild diffuse\par LVEDP 30

## 2023-07-19 NOTE — PHYSICAL EXAM
[No Acute Distress] : no acute distress [No Carotid Bruit] : no carotid bruit [Normal S1, S2] : normal S1, S2 [No Murmur] : no murmur [No Rub] : no rub [No Gallop] : no gallop [Clear Lung Fields] : clear lung fields [Wheeze ____] : wheeze [unfilled] [Soft] : abdomen soft [Moves all extremities] : moves all extremities [No edema] : no edema [No varicosities] : no varicosities [No chronic venous stasis changes] : no chronic venous stasis changes [de-identified] : no tremor on today's exam

## 2023-08-16 ENCOUNTER — APPOINTMENT (RX ONLY)
Dept: URBAN - METROPOLITAN AREA CLINIC 99 | Facility: CLINIC | Age: 88
Setting detail: DERMATOLOGY
End: 2023-08-16

## 2023-08-16 VITALS — HEIGHT: 63 IN | WEIGHT: 145 LBS

## 2023-08-16 DIAGNOSIS — L57.0 ACTINIC KERATOSIS: ICD-10-CM

## 2023-08-16 DIAGNOSIS — L85.3 XEROSIS CUTIS: ICD-10-CM

## 2023-08-16 DIAGNOSIS — D49.2 NEOPLASM OF UNSPECIFIED BEHAVIOR OF BONE, SOFT TISSUE, AND SKIN: ICD-10-CM

## 2023-08-16 DIAGNOSIS — L82.1 OTHER SEBORRHEIC KERATOSIS: ICD-10-CM

## 2023-08-16 PROCEDURE — ? COUNSELING

## 2023-08-16 PROCEDURE — 11103 TANGNTL BX SKIN EA SEP/ADDL: CPT

## 2023-08-16 PROCEDURE — 99213 OFFICE O/P EST LOW 20 MIN: CPT | Mod: 25

## 2023-08-16 PROCEDURE — ? LIQUID NITROGEN

## 2023-08-16 PROCEDURE — 11102 TANGNTL BX SKIN SINGLE LES: CPT

## 2023-08-16 PROCEDURE — 17000 DESTRUCT PREMALG LESION: CPT | Mod: 59

## 2023-08-16 PROCEDURE — ? BIOPSY BY SHAVE METHOD

## 2023-08-16 PROCEDURE — 17003 DESTRUCT PREMALG LES 2-14: CPT

## 2023-08-16 ASSESSMENT — LOCATION ZONE DERM
LOCATION ZONE: LEG
LOCATION ZONE: TRUNK
LOCATION ZONE: FACE
LOCATION ZONE: NECK
LOCATION ZONE: SCALP
LOCATION ZONE: ARM

## 2023-08-16 ASSESSMENT — LOCATION SIMPLE DESCRIPTION DERM
LOCATION SIMPLE: LEFT SCALP
LOCATION SIMPLE: RIGHT PRETIBIAL REGION
LOCATION SIMPLE: CHEST
LOCATION SIMPLE: RIGHT SHOULDER
LOCATION SIMPLE: RIGHT FOREARM
LOCATION SIMPLE: LEFT FOREARM
LOCATION SIMPLE: RIGHT SCALP
LOCATION SIMPLE: SCALP
LOCATION SIMPLE: ABDOMEN
LOCATION SIMPLE: LEFT PRETIBIAL REGION
LOCATION SIMPLE: LEFT ANTERIOR NECK
LOCATION SIMPLE: LEFT FOREHEAD

## 2023-08-16 ASSESSMENT — LOCATION DETAILED DESCRIPTION DERM
LOCATION DETAILED: LEFT CENTRAL FRONTAL SCALP
LOCATION DETAILED: RIGHT PROXIMAL DORSAL FOREARM
LOCATION DETAILED: LEFT DISTAL PRETIBIAL REGION
LOCATION DETAILED: STERNAL NOTCH
LOCATION DETAILED: LEFT CLAVICULAR NECK
LOCATION DETAILED: RIGHT SUPERIOR PARIETAL SCALP
LOCATION DETAILED: RIGHT ANTERIOR SHOULDER
LOCATION DETAILED: LEFT LATERAL FOREHEAD
LOCATION DETAILED: LEFT PROXIMAL DORSAL FOREARM
LOCATION DETAILED: LEFT LATERAL SUPERIOR CHEST
LOCATION DETAILED: RIGHT PROXIMAL PRETIBIAL REGION
LOCATION DETAILED: PERIUMBILICAL SKIN
LOCATION DETAILED: LEFT MEDIAL FRONTAL SCALP
LOCATION DETAILED: RIGHT MEDIAL FRONTAL SCALP

## 2023-08-16 NOTE — PROCEDURE: LIQUID NITROGEN
Detail Level: Detailed
Consent: The patient's consent was obtained including but not limited to risks of crusting, scabbing, blistering, scarring, darker or lighter pigmentary change, recurrence, incomplete removal and infection.
Render Post-Care Instructions In Note?: yes
Duration Of Freeze Thaw-Cycle (Seconds): 3
Number Of Freeze-Thaw Cycles: 1 freeze-thaw cycle
Post-Care Instructions: I reviewed with the patient in detail post-care instructions. Patient is to wear sunprotection, and avoid picking at any of the treated lesions. Pt may apply Vaseline to crusted or scabbing areas.
Render Note In Bullet Format When Appropriate: No

## 2023-08-16 NOTE — PROCEDURE: REASSURANCE
Hide Additional Notes?: No
Detail Level: Zone
Additional Notes (Optional): Moisturizer daily
Detail Level: Detailed

## 2023-08-16 NOTE — PROCEDURE: BIOPSY BY SHAVE METHOD
Detail Level: Detailed
Depth Of Biopsy: dermis
Was A Bandage Applied: Yes
Size Of Lesion In Cm: 0.5
X Size Of Lesion In Cm: 0
Biopsy Type: H and E
Biopsy Method: 15 blade
Anesthesia Type: 2% lidocaine with epinephrine
Hemostasis: Zee's
Wound Care: Vaseline
Dressing: bandage
Destruction After The Procedure: No
Type Of Destruction Used: Curettage
Electrodesiccation Text: The wound bed was treated with electrodesiccation after the biopsy was performed.
Electrodesiccation And Curettage Text: The wound bed was treated with electrodesiccation and curettage after the biopsy was performed.
Silver Nitrate Text: The wound bed was treated with silver nitrate after the biopsy was performed.
Lab: -[id number]
Lab Facility: [facility]
Consent: The provider's intent is to obtain a tissue sample solely for diagnostic purposes. Written consent was obtained and risks were reviewed including but not limited to scarring, infection, bleeding, scabbing, incomplete removal, nerve damage and allergy to anesthesia.
Post-Care Instructions: I reviewed with the patient in detail post-care instructions. Patient is to keep the biopsy site dry overnight, and then apply bacitracin twice daily until healed. Patient may apply hydrogen peroxide soaks to remove any crusting.
Notification Instructions: Patient will be notified of biopsy results. However, patient instructed to call the office if not contacted within 2 weeks.
Billing Type: [employer]
Information: Selecting Yes will display possible errors in your note based on the variables you have selected. This validation is only offered as a suggestion for you. PLEASE NOTE THAT THE VALIDATION TEXT WILL BE REMOVED WHEN YOU FINALIZE YOUR NOTE. IF YOU WANT TO FAX A PRELIMINARY NOTE YOU WILL NEED TO TOGGLE THIS TO 'NO' IF YOU DO NOT WANT IT IN YOUR FAXED NOTE.
Billing Type: Third-Party Bill

## 2023-09-06 NOTE — PATIENT PROFILE ADULT - FUNCTIONAL SCREEN CURRENT LEVEL: COMMUNICATION, MLM
There are no concerning findings on your work-up today in the emergency department. Take Zofran as needed for nausea and take Levsin as needed for abdominal discomfort. If you have any new or worsening symptoms return to the emergency department for evaluation and treatment.
0 = understands/communicates without difficulty

## 2023-09-06 NOTE — ED ADULT NURSE NOTE - NS ED NURSE RECORD ANOTHER HT AND WT
Itz Díaz,    Thank you for allowing [facility] to manage your care.      I sent your prescriptions to your pharmacy.        I made a referral, they will be calling in approximately 1 week to set up your appointment.  If you do not hear from them, please call the specialty number on your after visit.     For your convenience, test results are released as soon as they are available  Please allow 1-2 business days for me to send you a comment about your results.  If not done so, I encourage you to login into [employer] ([email or web address] to review your results in real time.     If you have any questions or concerns, please feel free to call us at [phone].    Sincerely,    Dr. Wilkerson    Did you know?      You can schedule a video visit for follow-up appointments as well as future appointments for certain conditions.  Please see the below link.     [email or web address]    If you have not already done so,  I encourage you to sign up for [employer] ([email or web address]).  This will allow you to review your results, securely communicate with a provider, and schedule virtual visits as well.    
Yes

## 2023-09-27 NOTE — ED ADULT NURSE NOTE - NS ED NURSE PATIENT LEFT UNIT TIME
Spoke to patient in reguards of portable oxygen tank has not been delivered . Called [facility] asking if they can resend paperwork via fax. [facility] [facility] states they do not see anything for patient for needing a portable oxygen tank . N.P. Floyce Spray has been informed and Patient verbalized understanding. 03:02

## 2023-10-13 ENCOUNTER — APPOINTMENT (OUTPATIENT)
Dept: PAIN MANAGEMENT | Facility: CLINIC | Age: 88
End: 2023-10-13
Payer: MEDICARE

## 2023-10-13 VITALS
DIASTOLIC BLOOD PRESSURE: 62 MMHG | SYSTOLIC BLOOD PRESSURE: 107 MMHG | HEART RATE: 56 BPM | WEIGHT: 154 LBS | HEIGHT: 67 IN | BODY MASS INDEX: 24.17 KG/M2

## 2023-10-13 DIAGNOSIS — G89.29 LOW BACK PAIN, UNSPECIFIED: ICD-10-CM

## 2023-10-13 DIAGNOSIS — M54.50 LOW BACK PAIN, UNSPECIFIED: ICD-10-CM

## 2023-10-13 PROCEDURE — 99213 OFFICE O/P EST LOW 20 MIN: CPT

## 2023-10-16 ENCOUNTER — APPOINTMENT (OUTPATIENT)
Dept: PULMONOLOGY | Facility: CLINIC | Age: 88
End: 2023-10-16
Payer: MEDICARE

## 2023-10-16 VITALS
DIASTOLIC BLOOD PRESSURE: 78 MMHG | SYSTOLIC BLOOD PRESSURE: 140 MMHG | WEIGHT: 150 LBS | BODY MASS INDEX: 25.61 KG/M2 | HEART RATE: 73 BPM | OXYGEN SATURATION: 98 % | HEIGHT: 64 IN

## 2023-10-16 DIAGNOSIS — R91.1 SOLITARY PULMONARY NODULE: ICD-10-CM

## 2023-10-16 PROCEDURE — 99214 OFFICE O/P EST MOD 30 MIN: CPT

## 2023-10-26 ENCOUNTER — APPOINTMENT (OUTPATIENT)
Dept: PAIN MANAGEMENT | Facility: CLINIC | Age: 88
End: 2023-10-26

## 2023-10-26 ENCOUNTER — APPOINTMENT (OUTPATIENT)
Dept: PAIN MANAGEMENT | Facility: CLINIC | Age: 88
End: 2023-10-26
Payer: MEDICARE

## 2023-10-26 PROCEDURE — 93770 DETERMINATION VENOUS PRESS: CPT

## 2023-10-26 PROCEDURE — 93040 RHYTHM ECG WITH REPORT: CPT | Mod: 79

## 2023-10-26 PROCEDURE — 62323 NJX INTERLAMINAR LMBR/SAC: CPT

## 2023-10-26 PROCEDURE — 94761 N-INVAS EAR/PLS OXIMETRY MLT: CPT

## 2023-11-07 ENCOUNTER — APPOINTMENT (OUTPATIENT)
Dept: CARDIOLOGY | Facility: CLINIC | Age: 88
End: 2023-11-07

## 2023-11-16 ENCOUNTER — APPOINTMENT (OUTPATIENT)
Dept: PAIN MANAGEMENT | Facility: CLINIC | Age: 88
End: 2023-11-16

## 2023-11-22 RX ORDER — ROSUVASTATIN CALCIUM 20 MG/1
20 TABLET, FILM COATED ORAL DAILY
Qty: 90 | Refills: 3 | Status: ACTIVE | COMMUNITY
Start: 1900-01-01 | End: 1900-01-01

## 2023-11-27 ENCOUNTER — APPOINTMENT (OUTPATIENT)
Dept: UROLOGY | Facility: CLINIC | Age: 88
End: 2023-11-27
Payer: MEDICARE

## 2023-11-27 VITALS
SYSTOLIC BLOOD PRESSURE: 142 MMHG | DIASTOLIC BLOOD PRESSURE: 75 MMHG | HEIGHT: 64 IN | HEART RATE: 70 BPM | BODY MASS INDEX: 25.61 KG/M2 | WEIGHT: 150 LBS | OXYGEN SATURATION: 98 % | RESPIRATION RATE: 14 BRPM

## 2023-11-27 PROCEDURE — 51798 US URINE CAPACITY MEASURE: CPT

## 2023-11-27 PROCEDURE — 99212 OFFICE O/P EST SF 10 MIN: CPT

## 2023-11-27 RX ORDER — TAMSULOSIN HYDROCHLORIDE 0.4 MG/1
0.4 CAPSULE ORAL
Qty: 90 | Refills: 3 | Status: ACTIVE | COMMUNITY
Start: 2022-08-22 | End: 1900-01-01

## 2023-11-30 ENCOUNTER — APPOINTMENT (OUTPATIENT)
Dept: CARDIOLOGY | Facility: CLINIC | Age: 88
End: 2023-11-30
Payer: MEDICARE

## 2023-11-30 VITALS
HEIGHT: 64 IN | WEIGHT: 157 LBS | BODY MASS INDEX: 26.8 KG/M2 | DIASTOLIC BLOOD PRESSURE: 78 MMHG | SYSTOLIC BLOOD PRESSURE: 122 MMHG | HEART RATE: 65 BPM

## 2023-11-30 PROCEDURE — 99214 OFFICE O/P EST MOD 30 MIN: CPT

## 2023-11-30 PROCEDURE — 93000 ELECTROCARDIOGRAM COMPLETE: CPT

## 2023-11-30 RX ORDER — DILTIAZEM HYDROCHLORIDE 120 MG/1
120 CAPSULE, EXTENDED RELEASE ORAL DAILY
Qty: 90 | Refills: 3 | Status: DISCONTINUED | COMMUNITY
End: 2023-11-30

## 2023-11-30 RX ORDER — DILTIAZEM HYDROCHLORIDE 120 MG/1
120 CAPSULE, EXTENDED RELEASE ORAL DAILY
Qty: 90 | Refills: 3 | Status: ACTIVE | COMMUNITY
Start: 1900-01-01 | End: 1900-01-01

## 2023-11-30 RX ORDER — LISINOPRIL 10 MG/1
10 TABLET ORAL TWICE DAILY
Qty: 180 | Refills: 3 | Status: DISCONTINUED | COMMUNITY
End: 2023-11-30

## 2023-11-30 RX ORDER — LISINOPRIL 10 MG/1
10 TABLET ORAL
Qty: 90 | Refills: 3 | Status: ACTIVE | COMMUNITY
Start: 1900-01-01 | End: 1900-01-01

## 2023-12-05 ENCOUNTER — OUTPATIENT (OUTPATIENT)
Dept: OUTPATIENT SERVICES | Facility: HOSPITAL | Age: 88
LOS: 1 days | End: 2023-12-05
Payer: MEDICARE

## 2023-12-05 ENCOUNTER — RESULT REVIEW (OUTPATIENT)
Age: 88
End: 2023-12-05

## 2023-12-05 DIAGNOSIS — Z98.890 OTHER SPECIFIED POSTPROCEDURAL STATES: Chronic | ICD-10-CM

## 2023-12-05 DIAGNOSIS — R06.09 OTHER FORMS OF DYSPNEA: ICD-10-CM

## 2023-12-05 DIAGNOSIS — Z00.8 ENCOUNTER FOR OTHER GENERAL EXAMINATION: ICD-10-CM

## 2023-12-05 PROCEDURE — A9500: CPT

## 2023-12-05 PROCEDURE — 78452 HT MUSCLE IMAGE SPECT MULT: CPT

## 2023-12-05 PROCEDURE — 93018 CV STRESS TEST I&R ONLY: CPT

## 2023-12-05 PROCEDURE — 78452 HT MUSCLE IMAGE SPECT MULT: CPT | Mod: 26,MH

## 2023-12-06 DIAGNOSIS — R06.09 OTHER FORMS OF DYSPNEA: ICD-10-CM

## 2024-01-01 NOTE — OCCUPATIONAL THERAPY INITIAL EVALUATION ADULT - TRANSFER SAFETY CONCERNS NOTED: BED/CHAIR, REHAB EVAL
decreased safety awareness/decreased weight-shifting ability Poly-Vi-Sol Drops oral liquid: 1 milliliter(s) orally once a day

## 2024-01-12 ENCOUNTER — APPOINTMENT (OUTPATIENT)
Dept: PAIN MANAGEMENT | Facility: CLINIC | Age: 89
End: 2024-01-12

## 2024-01-16 ENCOUNTER — APPOINTMENT (OUTPATIENT)
Dept: PULMONOLOGY | Facility: CLINIC | Age: 89
End: 2024-01-16
Payer: MEDICARE

## 2024-01-16 VITALS
OXYGEN SATURATION: 98 % | HEART RATE: 72 BPM | BODY MASS INDEX: 26.58 KG/M2 | RESPIRATION RATE: 14 BRPM | WEIGHT: 150 LBS | SYSTOLIC BLOOD PRESSURE: 132 MMHG | DIASTOLIC BLOOD PRESSURE: 70 MMHG | HEIGHT: 63 IN

## 2024-01-16 DIAGNOSIS — R06.02 SHORTNESS OF BREATH: ICD-10-CM

## 2024-01-16 PROCEDURE — 99214 OFFICE O/P EST MOD 30 MIN: CPT

## 2024-01-16 NOTE — HISTORY OF PRESENT ILLNESS
[TextBox_4] : He is not able to comply with BIPAP due to difficulty putting on the mask, and was inquiring about inspire risks and benefits. offered guidance that it will require a similar level of sophistication in operating the aparatus.  over all feel good no cough or wheeze currently  on trelegy he take it around 5 times per week

## 2024-01-16 NOTE — END OF VISIT
[FreeTextEntry3] : patient seen and examined with the fellow agree above note  continue trelegy  discussed again the bipap machine with the wife and discussed inspire they are not interested for now

## 2024-01-18 ENCOUNTER — RX RENEWAL (OUTPATIENT)
Age: 89
End: 2024-01-18

## 2024-01-18 ENCOUNTER — APPOINTMENT (OUTPATIENT)
Dept: CARDIOLOGY | Facility: CLINIC | Age: 89
End: 2024-01-18
Payer: MEDICARE

## 2024-01-18 VITALS — TEMPERATURE: 97.6 F | HEIGHT: 63 IN | WEIGHT: 159 LBS | BODY MASS INDEX: 28.17 KG/M2

## 2024-01-18 VITALS — HEART RATE: 58 BPM | DIASTOLIC BLOOD PRESSURE: 72 MMHG | SYSTOLIC BLOOD PRESSURE: 130 MMHG

## 2024-01-18 PROCEDURE — 99214 OFFICE O/P EST MOD 30 MIN: CPT | Mod: 25

## 2024-01-18 PROCEDURE — 93000 ELECTROCARDIOGRAM COMPLETE: CPT

## 2024-01-18 RX ORDER — DONEPEZIL HYDROCHLORIDE 5 MG/1
5 TABLET ORAL DAILY
Refills: 0 | Status: COMPLETED | COMMUNITY
End: 2024-01-18

## 2024-01-18 RX ORDER — PANTOPRAZOLE 40 MG/1
40 TABLET, DELAYED RELEASE ORAL
Refills: 0 | Status: ACTIVE | COMMUNITY

## 2024-01-18 RX ORDER — ESCITALOPRAM OXALATE 10 MG/1
10 TABLET ORAL DAILY
Refills: 0 | Status: ACTIVE | COMMUNITY

## 2024-01-18 RX ORDER — LATANOPROST/PF 0.005 %
0.01 DROPS OPHTHALMIC (EYE)
Refills: 0 | Status: ACTIVE | COMMUNITY

## 2024-01-18 RX ORDER — RIVASTIGMINE TARTRATE 1.5 MG/1
1.5 CAPSULE ORAL
Refills: 0 | Status: ACTIVE | COMMUNITY

## 2024-01-18 RX ORDER — GABAPENTIN 300 MG/1
300 CAPSULE ORAL
Qty: 60 | Refills: 0 | Status: COMPLETED | COMMUNITY
Start: 2023-05-12 | End: 2024-01-18

## 2024-01-18 NOTE — REVIEW OF SYSTEMS
[SOB] : shortness of breath [Dyspnea on exertion] : dyspnea during exertion [Feeling Fatigued] : not feeling fatigued [Chest Discomfort] : no chest discomfort [Lower Ext Edema] : lower extremity edema [Leg Claudication] : no intermittent leg claudication [Palpitations] : no palpitations [Orthopnea] : no orthopnea [PND] : no PND [Syncope] : no syncope [Wheezing] : no wheezing

## 2024-01-18 NOTE — CARDIOLOGY SUMMARY
[de-identified] : EKG 01/18/24 Sinus yane 56 bpm RBBB, inferior infarct, PRWP EKG 11/30/23 Normal sinus rhythm 65 bpm, RBBB, inf infarct EKG 2/1/23 sinus yane 53 bpm, RBBB, inferior infarct  EKG per Dr. Ha 12/22 sinus yane 52 bpm, iRBBB, NS ST T abn [de-identified] : TTE 6/2022 per Dr. Ha , mild AS AV peak gradient 13 mm Hg, AV mean gradient 5 mm Hg, CHRISTINA 2 cm,  mild to mod AI, mild MR< mild TR\par  TTE 4/3/23 normal biV sys function, EF 69%, mild AS, mild to mod AI, mod TR [de-identified] : Nuclear stress test 12/05/2023 no ischemia EF >55% Pharm NST 1/2019 no ischemia, EF 67% [de-identified] : Carotid US 6/2021 <50% ICA stenosis b/l \par  AA US 1/2010 ectatic 2.9 cm

## 2024-01-18 NOTE — ASSESSMENT
[FreeTextEntry1] : Assessment: #LE Swelling -Check labs. -Lasix 20mg PO PRN for leg swelling.  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

## 2024-01-18 NOTE — HISTORY OF PRESENT ILLNESS
[FreeTextEntry1] : 89M with HTN, HLD, urinary UTI, non-obstructive CAD, mod TR, mild-mod AS/AI, active smoker here for follow-up.  01/18/2024 Nuclear stress showed no evidence of ischemia and normal LV function -Patient has gained some weight with LE swelling. He is drinking diet soda. He denies further dyspnea. He did sleep study but cannot tolerate mask.   11/30/23 Significant dyspnea on exertion. No chest pain, palpitations.   7/18/23 Hospitalized 7/1/23 for lightheadedness and weakness x2 days, thought to have viral gastroenteritis  7/1 labs Hgb 15.1, WBC 12, Cr 1.3, K 4.8, AST 26, ALT 75, lipase 45, trop neg x1  Feels better. BP running on low side. Continues to smoke.   4/25/23 BERRY with a few steps. Sleep study and PFTs next month.   +L>R leg pain with ambulation. No CP, palpitations, LE edema, wounds.   [place] in August   Trying to cut down on smoking  2/1/23 He denies symptoms today. Hard of hearing - forgot hearing aids at home. His partner reports BERRY with 1/8 block. Denies CP, palpitations, claudication. +Dizziness when standing up to quickly. +hip pain/arthritis   Tobacco use for 50+ years   PCP: Dr. Girard Previous cardiologist is Dr. Ha

## 2024-01-18 NOTE — PROCEDURE
[FreeTextEntry1] : [facility] 2/2020 LM luminal mid LAD 40% LCx minimal  RCA mild diffuse LVEDP 30

## 2024-01-18 NOTE — PHYSICAL EXAM
[No Acute Distress] : no acute distress [No Carotid Bruit] : no carotid bruit [Normal S1, S2] : normal S1, S2 [No Murmur] : no murmur [No Rub] : no rub [No Gallop] : no gallop [Clear Lung Fields] : clear lung fields [Good Air Entry] : good air entry [No Respiratory Distress] : no respiratory distress  [Soft] : abdomen soft [Moves all extremities] : moves all extremities [No varicosities] : no varicosities [No chronic venous stasis changes] : no chronic venous stasis changes [1+] : Left:1+ [de-identified] : No wheezing on today's exam, 11/30/23

## 2024-01-26 ENCOUNTER — APPOINTMENT (OUTPATIENT)
Dept: PAIN MANAGEMENT | Facility: CLINIC | Age: 89
End: 2024-01-26

## 2024-02-06 ENCOUNTER — APPOINTMENT (OUTPATIENT)
Dept: PAIN MANAGEMENT | Facility: CLINIC | Age: 89
End: 2024-02-06

## 2024-02-09 ENCOUNTER — APPOINTMENT (OUTPATIENT)
Dept: PAIN MANAGEMENT | Facility: CLINIC | Age: 89
End: 2024-02-09

## 2024-02-20 ENCOUNTER — APPOINTMENT (OUTPATIENT)
Dept: PAIN MANAGEMENT | Facility: CLINIC | Age: 89
End: 2024-02-20

## 2024-02-20 ENCOUNTER — APPOINTMENT (OUTPATIENT)
Dept: PAIN MANAGEMENT | Facility: CLINIC | Age: 89
End: 2024-02-20
Payer: MEDICARE

## 2024-02-20 PROCEDURE — 62323 NJX INTERLAMINAR LMBR/SAC: CPT

## 2024-02-20 PROCEDURE — 94761 N-INVAS EAR/PLS OXIMETRY MLT: CPT | Mod: 59

## 2024-02-20 PROCEDURE — 93040 RHYTHM ECG WITH REPORT: CPT | Mod: 79

## 2024-02-20 PROCEDURE — 93770 DETERMINATION VENOUS PRESS: CPT | Mod: 59

## 2024-02-20 NOTE — PROCEDURE
[FreeTextEntry3] : CAUDAL EPIDURAL STEROID INJECTION     Date:  2024  Patient: Farrukh Oneal  :  1935 Preoperative Diagnosis: Lumbar Radiculopathy  Postoperative Diagnosis: Lumbar Radiculopathy    Procedure: Caudal epidural injection under fluoroscopic guidance    Physician: Alison Campa MD, FAAPMR    Anesthesia: See Nurses notes. Local.    Medical Necessity:  Failure of conservative management.    Indication for Fluoroscopy:  This procedure requires the precise placement of the spinal needle into the epidural space.  It is the only way to accurately and safely perform the injection.    CONSENT: The possible complications including infection, bleeding, nerve damage, hospital admission or failure of the procedure; though unusual, are theoretically possible. The patient was educated about the of the procedure and alternative therapies. All questions were answered and the patient freely gave consent to proceed.    Monitoring:  Patient had continuous blood pressure, EKG, and pulse oximetry throughout the case. See nurse's notes.     PROCEDURE NOTE:  After obtaining written consent, the patient was placed in the prone position with a pillow under the pelvis. Multiple fluoroscopic views of the sacrum-AP & Lateral- were obtained. The lower back and upper gluteal region were prepped with betadine and draped in the sterile fashion. A time out was performed.  The skin over the sacral hiatus was infiltrated with local anesthetic and a 18 gauge 3  inch needle was inserted. The angle of the needle was lowered until it was felt to penetrate the sacrococcygeal ligament at which time the needle was advanced without resistance. Fluoroscopy confirmed the position of the needle within the caudal space. Omnipaque 240, 3 cc was injected to confirm an appropriate epidural spread. A total of 9ml of preservative-free sterile saline, 1 ml of depomedrol (80mg/cc) was injected via the needle into the caudal space.  The needle was cleared with three ml preservative-free normal saline.  There was no evidence of CSF or heme. The needle was removed intact. A band aid was place on the site.     Epiduragram Report: A spinal needle is in place in the caudal epidural space.  Central epidural spread of dye is noted from the lower sacral segments to L4-5.    Dye is seen outlining the sacral nerve roots bilaterally as they emerge from their respective foramen without obstruction pointing away from adhesion/fibrosis    Complications: None. The patient tolerated the procedure well.      Disposition: I have examined the patient and there are no new physical findings since the original presentation.  Sensory and motor function were intact. The patient met discharge criteria see nurses notes. The discharge instruction sheet was reviewed and given to the patient. The patient was discharged home with a . If patient gets sustained relief will have patient do modified planks 3 times a day on carpet or yoga mat starting at 5 seconds building up to 1 minute without grimacing/Valsalva and walking.     Comments: 1st caudal SANTA pt. had 50% relief, 2nd caudal SANTA is today, depending on effectiveness would schedule a 3rd caudal SANTA in 1-2 weeks or follow up in office depending on insurance. Call if any problems.     This document was electronically signed by:    Alison Campa MD, FAAPMR  Diplomate, American Board of Physical Medicine and Rehabilitation  Diplomate, American Board of Pain Medicine

## 2024-03-06 ENCOUNTER — APPOINTMENT (OUTPATIENT)
Dept: PAIN MANAGEMENT | Facility: CLINIC | Age: 89
End: 2024-03-06

## 2024-03-15 NOTE — PATIENT PROFILE ADULT - HEALTH LITERACY
become proficient in their HEP and will be compliant in performing that program.  AT PN:  progressing: patient reports performing HEP 3x a week.      Next PN/ RC due 4/1/24  Auth due 16th visit or 4/30/24       PLAN  - Continue Plan of Care    [facility] PTA    3/15/2024    3:15 PM    Future Appointments   Date Time Provider Department Center   3/18/2024  1:40 PM [facility] PTA [facility]   3/22/2024  1:40 PM [facility] PTA [facility]   3/25/2024  1:40 PM [facility] PTA [facility]   4/29/2024  2:00 PM Sonia Momin MD [facility]         
no

## 2024-04-03 ENCOUNTER — APPOINTMENT (OUTPATIENT)
Dept: PAIN MANAGEMENT | Facility: CLINIC | Age: 89
End: 2024-04-03

## 2024-04-04 ENCOUNTER — APPOINTMENT (OUTPATIENT)
Dept: CARDIOLOGY | Facility: CLINIC | Age: 89
End: 2024-04-04
Payer: MEDICARE

## 2024-04-04 PROCEDURE — 93306 TTE W/DOPPLER COMPLETE: CPT

## 2024-04-25 ENCOUNTER — APPOINTMENT (OUTPATIENT)
Dept: PAIN MANAGEMENT | Facility: CLINIC | Age: 89
End: 2024-04-25
Payer: MEDICARE

## 2024-04-25 DIAGNOSIS — M47.27 OTHER SPONDYLOSIS WITH RADICULOPATHY, LUMBOSACRAL REGION: ICD-10-CM

## 2024-04-25 PROCEDURE — 99214 OFFICE O/P EST MOD 30 MIN: CPT

## 2024-04-25 NOTE — HISTORY OF PRESENT ILLNESS
[FreeTextEntry1] : ORIGINAL PRESENTATION: This is an 89 year old male here to establish care for lower back pain with radicular features into the bilateral lower extremities. He is here with a family member. He states that there is only radicular pain on occasion. He has a prior history of sciatic pain. He was previously under the care of another pain management provider in [place] who performed injections which provided him with moderate relief of his symptoms. Patient does not have records available for review and is unsure of the specific type of injection performed. The most recent injection was approximately 6 months ago. He attends PT sessions for symptom management with minimal relief. Pain is increased when ambulating and he states it feels like pulled muscles. He trials Tramadol sporadically which he does not believe provides him relief. There is no prior imaging for review today.   Of note, patient is under the care of a neurologist for difficulty with memory.   The patient has had this pain for 5 years. The pain started after no event Patient describes pain as moderate to severe nearly constant. During the last month the pain has been worse during the and no typical pattern. Patient has weakness in the lower extremities. Pain is increased with standing, sitting, walking. Pain is decreased with lying down. Pain is not changed with relaxation, coughing/sneezing, bowel movements. Bowel or bladder habits have not changed.  ACTIVITIES: Patient could walk 1 blocks before the pain starts. Patient can sit 8 hours before pain starts. Patient can stand 15 minutes before pain starts. The patient often lays down because of pain. Patient uses walker at this time. Patient has difficulty performing household chores at this time.  PRIOR PAIN TREATMENTS: No relief with physical therapy.  PRIOR PAIN MEDICATIONS:  Tramadol, Tylenol, Toradol.  PATIENT PRESENTS FOR FOLLOW UP: Last seen on 10/13/2023 and since then he underwent Caudal SANTA on  02/20/2024 and reports that he had almost 100% pain relief and improvement of functioning for at least six weeks. He reports that his pain started coming back to baseline and he is currently rates it as 8/10 in severity most of the time.  He is under our care for lower back pain with radicular features into the bilateral lower extremities. He states the pain continues from the spine to the feet. He is using walker/rollator for ambulation. We will request another Caudal SANTA w/no MAC today.

## 2024-04-25 NOTE — DISCUSSION/SUMMARY
[de-identified] : This is a 89 year old male with mostly localized lower back pain with occasional radicular features into the bilateral lower extremities. Previous injection therapy provided him with moderate relief of his symptoms. Imaging studies along with physical exam findings correlate symptomatology. We will move forward with a caudal SANTA x1 no MAC and follow up afterwards. All this patients questions were answered and the conversation was understood well.  Patient had a MRI that shows a radicular component along with pain referred into the lower extremity. Patient has trialed rehab (Home exercise, physical therapy or chiropractic care) and medications. We will schedule a Caudal 1-3 depending on effectiveness.  RISK AND BENEFIT PARAGRAPH: Risk, benefits, pros and cons of procedure were explained to the patient using models and diagrams and their questions were answered.  Total clinician time spent today on the patient is 30 minutes including preparing to see the patient, obtaining and/or reviewing and confirming history, performing medically necessary and appropriate examination, counseling and educating the patient and/or family, documenting clinical information in the EHR and communicating and/or referring to other healthcare professionals.  AGA Corona MD

## 2024-04-25 NOTE — PHYSICAL EXAM
[Normal Coordination] : normal coordination [Normal DTR UE/LE] : normal DTR UE/LE  [Normal Sensation] : normal sensation [Normal Mood and Affect] : normal mood and affect [Oriented] : oriented [Able to Communicate] : able to communicate [Well Developed] : well developed [Well Nourished] : well nourished [de-identified] : Oriented to self, but not to time [Flexion] : flexion [Extension] : extension [] : patient ambulates with assistive device Dupixent Counseling: I discussed with the patient the risks of dupilumab including but not limited to eye infection and irritation, cold sores, injection site reactions, worsening of asthma, allergic reactions and increased risk of parasitic infection.  Live vaccines should be avoided while taking dupilumab. Dupilumab will also interact with certain medications such as warfarin and cyclosporine. The patient understands that monitoring is required and they must alert us or the primary physician if symptoms of infection or other concerning signs are noted.

## 2024-04-30 ENCOUNTER — APPOINTMENT (OUTPATIENT)
Dept: PAIN MANAGEMENT | Facility: CLINIC | Age: 89
End: 2024-04-30
Payer: MEDICARE

## 2024-04-30 DIAGNOSIS — M54.16 RADICULOPATHY, LUMBAR REGION: ICD-10-CM

## 2024-04-30 PROCEDURE — 93770 DETERMINATION VENOUS PRESS: CPT | Mod: 59

## 2024-04-30 PROCEDURE — 62323 NJX INTERLAMINAR LMBR/SAC: CPT

## 2024-04-30 PROCEDURE — 94761 N-INVAS EAR/PLS OXIMETRY MLT: CPT | Mod: 59

## 2024-04-30 PROCEDURE — 93040 RHYTHM ECG WITH REPORT: CPT | Mod: 79

## 2024-04-30 NOTE — PROCEDURE
[FreeTextEntry3] : CAUDAL EPIDURAL STEROID INJECTION     Date:  2024  Patient: Farrukh Oneal  :  1935 Preoperative Diagnosis: Lumbar Radiculopathy  Postoperative Diagnosis: Lumbar Radiculopathy    Procedure: Caudal epidural injection under fluoroscopic guidance    Physician: Alison Campa MD, FAAPMR    Anesthesia: See Nurses notes. Local.    Medical Necessity:  Failure of conservative management.    Indication for Fluoroscopy:  This procedure requires the precise placement of the spinal needle into the epidural space.  It is the only way to accurately and safely perform the injection.    CONSENT: The possible complications including infection, bleeding, nerve damage, hospital admission or failure of the procedure; though unusual, are theoretically possible. The patient was educated about the of the procedure and alternative therapies. All questions were answered and the patient freely gave consent to proceed.    Monitoring:  Patient had continuous blood pressure, EKG, and pulse oximetry throughout the case. See nurse's notes.     PROCEDURE NOTE:  After obtaining written consent, the patient was placed in the prone position with a pillow under the pelvis. Multiple fluoroscopic views of the sacrum-AP & Lateral- were obtained. The lower back and upper gluteal region were prepped with betadine and draped in the sterile fashion. A time out was performed.  The skin over the sacral hiatus was infiltrated with local anesthetic and a 18 gauge 3-1/2  inch Tuohy needle was inserted. The angle of the needle was lowered until it was felt to penetrate the sacrococcygeal ligament at which time the needle was advanced without resistance. Fluoroscopy confirmed the position of the needle within the caudal space. Omnipaque 240, 3 cc was injected to confirm an appropriate epidural spread. A total of 9ml of preservative-free sterile saline, 1 ml of depomedrol (80mg/cc) was injected via the needle into the caudal space.  The needle was cleared with three ml preservative-free normal saline.  There was no evidence of CSF or heme. The needle was removed intact. A band aid was place on the site.     Epiduragram Report: A spinal needle is in place in the caudal epidural space.  Central epidural spread of dye is noted from the lower sacral segments to L4-5.    Dye is seen outlining the sacral nerve roots bilaterally as they emerge from their respective foramen without obstruction pointing away from adhesion/fibrosis    Complications: None. The patient tolerated the procedure well.      Disposition: I have examined the patient and there are no new physical findings since the original presentation.  Sensory and motor function were intact. The patient met discharge criteria see nurses notes. The discharge instruction sheet was reviewed and given to the patient. The patient was discharged home with a . If patient gets sustained relief will have patient do modified planks 3 times a day on carpet or yoga mat starting at 5 seconds building up to 1 minute without grimacing/Valsalva and walking.     Comments: 3rd caudal SANTA today. Schedule a follow up in office. Call if any problems.     This document was electronically signed by:    Alison Campa MD, FAAPMR  Diplomate, American Board of Physical Medicine and Rehabilitation  Diplomate, American Board of Pain Medicine

## 2024-05-02 ENCOUNTER — APPOINTMENT (OUTPATIENT)
Dept: CARDIOLOGY | Facility: CLINIC | Age: 89
End: 2024-05-02
Payer: MEDICARE

## 2024-05-02 VITALS — HEART RATE: 64 BPM | SYSTOLIC BLOOD PRESSURE: 132 MMHG | DIASTOLIC BLOOD PRESSURE: 70 MMHG

## 2024-05-02 VITALS — SYSTOLIC BLOOD PRESSURE: 122 MMHG | DIASTOLIC BLOOD PRESSURE: 70 MMHG

## 2024-05-02 VITALS — HEIGHT: 62 IN | BODY MASS INDEX: 28.16 KG/M2 | WEIGHT: 153 LBS | TEMPERATURE: 97.6 F

## 2024-05-02 DIAGNOSIS — I35.0 NONRHEUMATIC AORTIC (VALVE) STENOSIS: ICD-10-CM

## 2024-05-02 DIAGNOSIS — I10 ESSENTIAL (PRIMARY) HYPERTENSION: ICD-10-CM

## 2024-05-02 DIAGNOSIS — R60.0 LOCALIZED EDEMA: ICD-10-CM

## 2024-05-02 DIAGNOSIS — I25.10 ATHEROSCLEROTIC HEART DISEASE OF NATIVE CORONARY ARTERY W/OUT ANGINA PECTORIS: ICD-10-CM

## 2024-05-02 DIAGNOSIS — R06.09 OTHER FORMS OF DYSPNEA: ICD-10-CM

## 2024-05-02 PROCEDURE — 93000 ELECTROCARDIOGRAM COMPLETE: CPT

## 2024-05-02 PROCEDURE — 99214 OFFICE O/P EST MOD 30 MIN: CPT | Mod: 25

## 2024-05-02 RX ORDER — LATANOPROST/PF 0.005 %
0.01 DROPS OPHTHALMIC (EYE)
Refills: 0 | Status: ACTIVE | COMMUNITY

## 2024-05-02 NOTE — PROCEDURE
[FreeTextEntry1] : [facility] 2/2020 LM luminal mid LAD 40% LCx minimal  RCA mild diffuse LVEDP 30

## 2024-05-02 NOTE — CARDIOLOGY SUMMARY
[de-identified] : EKG 05/02/24 NSR 64 bpm. RBBB. Inferior infarct.  EKG 01/18/24 Sinus yane 56 bpm RBBB, inferior infarct, PRWP EKG 11/30/23 Normal sinus rhythm 65 bpm, RBBB, inf infarct EKG 2/1/23 sinus yane 53 bpm, RBBB, inferior infarct  EKG per Dr. aH 12/22 sinus yane 52 bpm, iRBBB, NS ST T abn [de-identified] : TTE 6/2022 per Dr. Ha , mild AS AV peak gradient 13 mm Hg, AV mean gradient 5 mm Hg, CHRISTINA 2 cm,  mild to mod AI, mild MR< mild TR TTE 4/3/23 normal biV sys function, EF 69%, mild AS, mild to mod AI, mod TR TTE 04/04/24 EF 72%. Aortic sclerosis. mild-mod AI. Mild TR.  [de-identified] : Nuclear stress test 12/05/2023 no ischemia EF >55% Pharm NST 1/2019 no ischemia, EF 67% [de-identified] : Carotid US 6/2021 <50% ICA stenosis b/l \par  AA US 1/2010 ectatic 2.9 cm

## 2024-05-02 NOTE — PHYSICAL EXAM
[No Acute Distress] : no acute distress [No Carotid Bruit] : no carotid bruit [Normal S1, S2] : normal S1, S2 [No Murmur] : no murmur [No Rub] : no rub [No Gallop] : no gallop [Clear Lung Fields] : clear lung fields [Good Air Entry] : good air entry [No Respiratory Distress] : no respiratory distress  [Soft] : abdomen soft [Moves all extremities] : moves all extremities [No varicosities] : no varicosities [No chronic venous stasis changes] : no chronic venous stasis changes [1+] : Left:1+ [de-identified] : No wheezing on today's exam, 11/30/23

## 2024-05-02 NOTE — HISTORY OF PRESENT ILLNESS
[FreeTextEntry1] : 89M with HTN, HLD, urinary UTI, non-obstructive CAD, mod TR, mild-mod AS/AI, active smoker here for follow-up.  05/02/24 Patient with a lot of daytime sleepiness and also SOB. Still not wearing CPAP.  TTE stable without signs of HF.   01/18/2024 Nuclear stress showed no evidence of ischemia and normal LV function -Patient has gained some weight with LE swelling. He is drinking diet soda. He denies further dyspnea. He did sleep study but cannot tolerate mask.   11/30/23 Significant dyspnea on exertion. No chest pain, palpitations.   7/18/23 Hospitalized 7/1/23 for lightheadedness and weakness x2 days, thought to have viral gastroenteritis  7/1 labs Hgb 15.1, WBC 12, Cr 1.3, K 4.8, AST 26, ALT 75, lipase 45, trop neg x1  Feels better. BP running on low side. Continues to smoke.   4/25/23 BERRY with a few steps. Sleep study and PFTs next month.   +L>R leg pain with ambulation. No CP, palpitations, LE edema, wounds.   [place] in August   Trying to cut down on smoking  2/1/23 He denies symptoms today. Hard of hearing - forgot hearing aids at home. His partner reports BERRY with 1/8 block. Denies CP, palpitations, claudication. +Dizziness when standing up to quickly. +hip pain/arthritis   Tobacco use for 50+ years   PCP: Dr. Girard Previous cardiologist is Dr. Ha

## 2024-05-02 NOTE — ASSESSMENT
[FreeTextEntry1] : Assessment: #LE Swelling -Lasix 20mg PO PRN for leg swelling.  -Repeat TTE shows stable findings from previously #BERRY - May be due to COPD, need to re-evaluate coronary arteries given known LAD disease - No evidence of ischemia, normal LV function on NM stress test 12/2023 #PAD - JACKELYN/PVR 6/26/23 calcified vessels, R TBI 0.61, L TBI 0.63 - LE art US 6/26/23 RLE mild athero, L disal SFA 50-75% stenosis, heavily calcified #Mild to mod AI and mild AS #Non-obs CAD - [facility] 2020 LAD 40% #HTN - controlled #HLD - at goal on rosuva 20 - 12/13/22 , TG 61, HDL 52, LDL 52 #Active smoker  Plan: -Daytime sleepiness likely 2/2 to ARCHANA and not using CPAP machine. Likely also contributing to ongoing SOB.  - Medical management for PAD - Follow up with pulmonary - Continue ASA 81 and rosuvastatin 20 mg daily  - Continue Imdur 30, lisinopril 10, propranolol, diltiazem 120 mg daily  - Smoking cessation -Follow up in 3-4 months with Dr. King

## 2024-05-02 NOTE — REVIEW OF SYSTEMS
[SOB] : shortness of breath [Dyspnea on exertion] : dyspnea during exertion [Lower Ext Edema] : lower extremity edema [Feeling Fatigued] : not feeling fatigued [Chest Discomfort] : no chest discomfort [Leg Claudication] : no intermittent leg claudication [Palpitations] : no palpitations [Orthopnea] : no orthopnea [PND] : no PND [Syncope] : no syncope [Wheezing] : no wheezing

## 2024-05-07 ENCOUNTER — RX RENEWAL (OUTPATIENT)
Age: 89
End: 2024-05-07

## 2024-05-07 RX ORDER — FUROSEMIDE 20 MG/1
20 TABLET ORAL
Qty: 90 | Refills: 3 | Status: ACTIVE | COMMUNITY
Start: 2024-01-18 | End: 1900-01-01

## 2024-05-14 ENCOUNTER — APPOINTMENT (OUTPATIENT)
Dept: PAIN MANAGEMENT | Facility: CLINIC | Age: 89
End: 2024-05-14

## 2024-05-21 ENCOUNTER — APPOINTMENT (OUTPATIENT)
Dept: PULMONOLOGY | Facility: CLINIC | Age: 89
End: 2024-05-21
Payer: MEDICARE

## 2024-05-21 VITALS
DIASTOLIC BLOOD PRESSURE: 74 MMHG | BODY MASS INDEX: 28.52 KG/M2 | SYSTOLIC BLOOD PRESSURE: 132 MMHG | OXYGEN SATURATION: 96 % | WEIGHT: 155 LBS | HEIGHT: 62 IN | HEART RATE: 92 BPM

## 2024-05-21 DIAGNOSIS — G47.33 OBSTRUCTIVE SLEEP APNEA (ADULT) (PEDIATRIC): ICD-10-CM

## 2024-05-21 PROCEDURE — 99213 OFFICE O/P EST LOW 20 MIN: CPT

## 2024-05-21 NOTE — HISTORY OF PRESENT ILLNESS
[Initial Evaluation - Existing Diagnosis] : an initial evaluation of an existing diagnosis of [Excessive Daytime Sleepiness] : excessive daytime sleepiness [Witnessed Apnea During Sleep] : witnessed apnea during sleep [Witnessed Gasping During Sleep] : witnessed gasping during sleep [Snoring] : snoring [Unrefreshing Sleep] : unrefreshing sleep [Currently Experiencing] : The patient is currently experiencing symptoms. [Obesity] : obesity

## 2024-05-21 NOTE — PLAN
[TextEntry] : will proceed with split test and then order bipap machine with nasal pillow  told need to use it at least 4 hrs per night  then if he remain complaining of daytime somnolence after being complaint with machine then will proceed with  MSLT

## 2024-05-21 NOTE — REASON FOR VISIT
[Follow-Up] : a follow-up visit [Sleep Apnea] : sleep apnea [TextEntry] : Patient coming for follow-up he was supposed to be on bipap machine for severe mere  but he returned it back   as per the wife the patient fulling asleep most the time during the day ,falling asleep ,s snoring and gasping sometimes or choking while sleeping, worsening daytime somnolence. she was concerned about narcolepsy , i explained to her that he has severe mere which can explain the daytime somnolence  a

## 2024-06-05 ENCOUNTER — OUTPATIENT (OUTPATIENT)
Dept: OUTPATIENT SERVICES | Facility: HOSPITAL | Age: 89
LOS: 1 days | Discharge: HOME | End: 2024-06-05
Payer: MEDICARE

## 2024-06-05 ENCOUNTER — APPOINTMENT (OUTPATIENT)
Dept: SLEEP CENTER | Facility: HOSPITAL | Age: 89
End: 2024-06-05
Payer: MEDICARE

## 2024-06-05 DIAGNOSIS — G47.33 OBSTRUCTIVE SLEEP APNEA (ADULT) (PEDIATRIC): ICD-10-CM

## 2024-06-05 DIAGNOSIS — Z98.890 OTHER SPECIFIED POSTPROCEDURAL STATES: Chronic | ICD-10-CM

## 2024-06-05 PROCEDURE — 95800 SLP STDY UNATTENDED: CPT | Mod: 26

## 2024-06-05 PROCEDURE — 95800 SLP STDY UNATTENDED: CPT

## 2024-06-08 DIAGNOSIS — G47.33 OBSTRUCTIVE SLEEP APNEA (ADULT) (PEDIATRIC): ICD-10-CM

## 2024-07-10 NOTE — DISCHARGE NOTE NURSING/CASE MANAGEMENT/SOCIAL WORK - NSDCVIVACCINE_GEN_ALL_CORE_FT
Caller: Pricilla Senior    Relationship to patient: Self    Best call back number: [phone]    Chief complaint: DIABETES     Type of visit: FOLLOW UP 2     Requested date: ASAP      If rescheduling, when is the original appointment: 12/11/24      Additional notes: PT HAS AN APPT IN DEC BUT IS SUPPOSED TO SEE DR MARCIAL EVERY 6 MOS. PLEASE CALL W AN EARLIER APPT IF POSSIBLE.       No Vaccines Administered.

## 2024-07-23 ENCOUNTER — APPOINTMENT (OUTPATIENT)
Dept: CARDIOLOGY | Facility: CLINIC | Age: 89
End: 2024-07-23
Payer: MEDICARE

## 2024-07-23 DIAGNOSIS — I10 ESSENTIAL (PRIMARY) HYPERTENSION: ICD-10-CM

## 2024-07-23 DIAGNOSIS — R06.09 OTHER FORMS OF DYSPNEA: ICD-10-CM

## 2024-07-23 DIAGNOSIS — I35.0 NONRHEUMATIC AORTIC (VALVE) STENOSIS: ICD-10-CM

## 2024-07-23 DIAGNOSIS — R06.02 SHORTNESS OF BREATH: ICD-10-CM

## 2024-07-23 DIAGNOSIS — Z86.39 PERSONAL HISTORY OF OTHER ENDOCRINE, NUTRITIONAL AND METABOLIC DISEASE: ICD-10-CM

## 2024-07-23 DIAGNOSIS — G47.33 OBSTRUCTIVE SLEEP APNEA (ADULT) (PEDIATRIC): ICD-10-CM

## 2024-07-23 DIAGNOSIS — I25.10 ATHEROSCLEROTIC HEART DISEASE OF NATIVE CORONARY ARTERY W/OUT ANGINA PECTORIS: ICD-10-CM

## 2024-07-23 PROCEDURE — G2211 COMPLEX E/M VISIT ADD ON: CPT

## 2024-07-23 PROCEDURE — 99214 OFFICE O/P EST MOD 30 MIN: CPT

## 2024-07-23 RX ORDER — TRAMADOL HYDROCHLORIDE 50 MG/1
50 TABLET, COATED ORAL
Refills: 0 | Status: ACTIVE | COMMUNITY

## 2024-07-23 RX ORDER — SERTRALINE HYDROCHLORIDE 50 MG/1
50 TABLET, FILM COATED ORAL DAILY
Refills: 0 | Status: ACTIVE | COMMUNITY

## 2024-07-23 NOTE — PHYSICAL EXAM
[No Acute Distress] : no acute distress [No Carotid Bruit] : no carotid bruit [Normal S1, S2] : normal S1, S2 [No Murmur] : no murmur [No Rub] : no rub [No Gallop] : no gallop [Clear Lung Fields] : clear lung fields [Good Air Entry] : good air entry [No Respiratory Distress] : no respiratory distress  [Soft] : abdomen soft [Moves all extremities] : moves all extremities [No varicosities] : no varicosities [No chronic venous stasis changes] : no chronic venous stasis changes [Trace] : Left: trace [de-identified] : Upper airway wheezing but lungs clear to auscultation.

## 2024-07-23 NOTE — ASSESSMENT
[FreeTextEntry1] : Assessment: #LE Swelling -Lasix 20mg PO PRN for leg swelling.  -Repeat TTE shows stable findings from previously #BERRY - May be due to COPD, need to re-evaluate coronary arteries given known LAD disease - No evidence of ischemia, normal LV function on NM stress test 12/2023 #PAD - JACKELYN/PVR 6/26/23 calcified vessels, R TBI 0.61, L TBI 0.63 - LE art US 6/26/23 RLE mild athero, L disal SFA 50-75% stenosis, heavily calcified #Mild to mod AI and mild AS #Non-obs CAD - [facility] 2020 LAD 40% #HTN - controlled #HLD - at goal on rosuva 20 - 12/13/22 , TG 61, HDL 52, LDL 52 #Active smoker  Plan: -Daytime sleepiness likely 2/2 to ARCHANA and not using CPAP machine. Likely also contributing to ongoing SOB.  - Medical management for PAD - Follow up with pulmonary - Continue ASA 81 and rosuvastatin 20 mg daily  - Continue Imdur 30, lisinopril 10, propranolol, diltiazem 120 mg daily  - Smoking cessation -Follow up in 3-4 months with Dr. King

## 2024-07-23 NOTE — PROCEDURE
[FreeTextEntry1] : [facility] 2/2020 LM luminal mid LAD 40% LCx minimal  RCA mild diffuse LVEDP 30

## 2024-07-23 NOTE — HISTORY OF PRESENT ILLNESS
[FreeTextEntry1] : 89M with HTN, HLD, urinary UTI, non-obstructive CAD, mod TR, mild-mod AS/AI, active smoker here for follow-up.  07/23/24 Patient still with SOB, fatigue and daytime sleepiness. Still needs to wear CPAP.   05/02/24 Patient with a lot of daytime sleepiness and also SOB. Still not wearing CPAP.  TTE stable without signs of HF.   01/18/2024 Nuclear stress showed no evidence of ischemia and normal LV function -Patient has gained some weight with LE swelling. He is drinking diet soda. He denies further dyspnea. He did sleep study but cannot tolerate mask.   11/30/23 Significant dyspnea on exertion. No chest pain, palpitations.   7/18/23 Hospitalized 7/1/23 for lightheadedness and weakness x2 days, thought to have viral gastroenteritis  7/1 labs Hgb 15.1, WBC 12, Cr 1.3, K 4.8, AST 26, ALT 75, lipase 45, trop neg x1  Feels better. BP running on low side. Continues to smoke.   4/25/23 BERRY with a few steps. Sleep study and PFTs next month.   +L>R leg pain with ambulation. No CP, palpitations, LE edema, wounds.   [place] in August   Trying to cut down on smoking  2/1/23 He denies symptoms today. Hard of hearing - forgot hearing aids at home. His partner reports BERRY with 1/8 block. Denies CP, palpitations, claudication. +Dizziness when standing up to quickly. +hip pain/arthritis   Tobacco use for 50+ years   PCP: Dr. Girard Previous cardiologist is Dr. Ha

## 2024-07-23 NOTE — CARDIOLOGY SUMMARY
[de-identified] : EKG 05/02/24 NSR 64 bpm. RBBB. Inferior infarct.  EKG 01/18/24 Sinus yane 56 bpm RBBB, inferior infarct, PRWP EKG 11/30/23 Normal sinus rhythm 65 bpm, RBBB, inf infarct EKG 2/1/23 sinus yane 53 bpm, RBBB, inferior infarct  EKG per Dr. Ha 12/22 sinus yane 52 bpm, iRBBB, NS ST T abn [de-identified] : TTE 6/2022 per Dr. Ha , mild AS AV peak gradient 13 mm Hg, AV mean gradient 5 mm Hg, CHRISTINA 2 cm,  mild to mod AI, mild MR< mild TR TTE 4/3/23 normal biV sys function, EF 69%, mild AS, mild to mod AI, mod TR TTE 04/04/24 EF 72%. Aortic sclerosis. mild-mod AI. Mild TR.  [de-identified] : Nuclear stress test 12/05/2023 no ischemia EF >55% Pharm NST 1/2019 no ischemia, EF 67% [de-identified] : Carotid US 6/2021 <50% ICA stenosis b/l \par  AA US 1/2010 ectatic 2.9 cm

## 2024-07-24 ENCOUNTER — APPOINTMENT (OUTPATIENT)
Dept: PAIN MANAGEMENT | Facility: CLINIC | Age: 89
End: 2024-07-24
Payer: MEDICARE

## 2024-07-24 DIAGNOSIS — M54.50 LOW BACK PAIN, UNSPECIFIED: ICD-10-CM

## 2024-07-24 DIAGNOSIS — M54.16 RADICULOPATHY, LUMBAR REGION: ICD-10-CM

## 2024-07-24 PROCEDURE — 99213 OFFICE O/P EST LOW 20 MIN: CPT

## 2024-07-24 NOTE — REASON FOR VISIT
[Initial Visit] : an initial pain management visit [Family Member] : family member [FreeTextEntry2] : Follow up after injection

## 2024-07-24 NOTE — PHYSICAL EXAM
[Normal Coordination] : normal coordination [Normal DTR UE/LE] : normal DTR UE/LE  [Normal Sensation] : normal sensation [Normal Mood and Affect] : normal mood and affect [Oriented] : oriented [Able to Communicate] : able to communicate [Well Developed] : well developed [Well Nourished] : well nourished [Flexion] : flexion [Extension] : extension [de-identified] : Oriented to self, but not to time [] : patient ambulates with assistive device

## 2024-07-24 NOTE — ASSESSMENT
[FreeTextEntry1] : This is an 89-year-old male with mostly localized lower back pain with occasional radicular features into the bilateral lower extremities. Previous injection therapy provided him with moderate relief of his symptoms. Imaging studies along with physical exam findings correlate symptomatology. Prior caudal SANTA injection provided him with moderate relief of his symptoms for 2-3 weeks. We will move forward with a repeat caudal SANTA x1 no MAC and follow up afterwards. All this patients questions were answered and the conversation was understood well.  Patient had a MRI that shows a radicular component along with pain referred into the lower extremity. Patient has trialed rehab (Home exercise, physical therapy or chiropractic care) and medications I will schedule a Caudal 1-3 depending on effectiveness.  Risk, benefits, pros and cons of procedure were explained to the patient using models and diagrams and their questions were answered.  Entered by Maxine Rivera, acting as scribe for Dr. Campa.  Documentation recorded by the scribe, in my presence, accurately reflects the service I personally performed, and the decisions made by me with my edits as appropriate.     Thank you for allowing me to assist in the management of this patient.     Best Regards,     Alison Campa M.D., FAAPMR     Diplomate, American Board of Physical Medicine and Rehabilitation Diplomate, American Board of Pain Medicine

## 2024-07-24 NOTE — HISTORY OF PRESENT ILLNESS
[FreeTextEntry1] : ORIGINAL PRESENTATION: This is an 89 year old male here to establish care for lower back pain with radicular features into the bilateral lower extremities. He is here with a family member. He states that there is only radicular pain on occasion. He has a prior history of sciatic pain. He was previously under the care of another pain management provider in [place] who performed injections which provided him with moderate relief of his symptoms. Patient does not have records available for review and is unsure of the specific type of injection performed. The most recent injection was approximately 6 months ago. He attends PT sessions for symptom management with minimal relief. Pain is increased when ambulating and he states it feels like pulled muscles. He trials Tramadol sporadically which he does not believe provides him relief. There is no prior imaging for review today.   Of note, patient is under the care of a neurologist for difficulty with memory.   The patient has had this pain for 5 years. The pain started after no event Patient describes pain as moderate to severe nearly constant. During the last month the pain has been worse during the and no typical pattern. Patient has weakness in the lower extremities. Pain is increased with standing, sitting, walking. Pain is decreased with lying down. Pain is not changed with relaxation, coughing/sneezing, bowel movements. Bowel or bladder habits have not changed.  ACTIVITIES: Patient could walk 1 blocks before the pain starts. Patient can sit 8 hours before pain starts. Patient can stand 15 minutes before pain starts. The patient often lays down because of pain. Patient uses walker at this time. Patient has difficulty performing household chores at this time.  PRIOR PAIN TREATMENTS: No relief with physical therapy.  PRIOR PAIN MEDICATIONS:  Tramadol, Tylenol, Toradol.  PATIENT PRESENTS FOR FOLLOW UP: He was last seen on 4/25/24 for complaints of lower back pain with radicular features into the bilateral lower extremities. He is s/p a caudal SANTA NO mac on 4/30/24 which provided him with moderate relief for 2-3 weeks. Unfortunately, the pain is slowly returning to baseline, and he continues to walk in a hunched over position. He is interested in repeating the injection at this time.

## 2024-12-14 ENCOUNTER — EMERGENCY (EMERGENCY)
Facility: HOSPITAL | Age: 88
LOS: 0 days | Discharge: HOME | End: 2024-12-14
Attending: EMERGENCY MEDICINE
Payer: MEDICARE

## 2024-12-14 VITALS — HEART RATE: 66 BPM | SYSTOLIC BLOOD PRESSURE: 147 MMHG | DIASTOLIC BLOOD PRESSURE: 74 MMHG

## 2024-12-14 VITALS
DIASTOLIC BLOOD PRESSURE: 84 MMHG | WEIGHT: 149.91 LBS | HEIGHT: 63 IN | RESPIRATION RATE: 18 BRPM | HEART RATE: 102 BPM | SYSTOLIC BLOOD PRESSURE: 149 MMHG | TEMPERATURE: 97 F | OXYGEN SATURATION: 95 %

## 2024-12-14 DIAGNOSIS — K21.9 GASTRO-ESOPHAGEAL REFLUX DISEASE WITHOUT ESOPHAGITIS: ICD-10-CM

## 2024-12-14 DIAGNOSIS — R45.1 RESTLESSNESS AND AGITATION: ICD-10-CM

## 2024-12-14 DIAGNOSIS — Z98.890 OTHER SPECIFIED POSTPROCEDURAL STATES: Chronic | ICD-10-CM

## 2024-12-14 DIAGNOSIS — R41.0 DISORIENTATION, UNSPECIFIED: ICD-10-CM

## 2024-12-14 DIAGNOSIS — N40.0 BENIGN PROSTATIC HYPERPLASIA WITHOUT LOWER URINARY TRACT SYMPTOMS: ICD-10-CM

## 2024-12-14 DIAGNOSIS — I10 ESSENTIAL (PRIMARY) HYPERTENSION: ICD-10-CM

## 2024-12-14 DIAGNOSIS — G47.33 OBSTRUCTIVE SLEEP APNEA (ADULT) (PEDIATRIC): ICD-10-CM

## 2024-12-14 DIAGNOSIS — E78.5 HYPERLIPIDEMIA, UNSPECIFIED: ICD-10-CM

## 2024-12-14 DIAGNOSIS — J45.909 UNSPECIFIED ASTHMA, UNCOMPLICATED: ICD-10-CM

## 2024-12-14 LAB
ALBUMIN SERPL ELPH-MCNC: 4 G/DL — SIGNIFICANT CHANGE UP (ref 3.5–5.2)
ALP SERPL-CCNC: 82 U/L — SIGNIFICANT CHANGE UP (ref 30–115)
ALT FLD-CCNC: 8 U/L — SIGNIFICANT CHANGE UP (ref 0–41)
ANION GAP SERPL CALC-SCNC: 10 MMOL/L — SIGNIFICANT CHANGE UP (ref 7–14)
APPEARANCE UR: CLEAR — SIGNIFICANT CHANGE UP
AST SERPL-CCNC: 11 U/L — SIGNIFICANT CHANGE UP (ref 0–41)
BASOPHILS # BLD AUTO: 0.05 K/UL — SIGNIFICANT CHANGE UP (ref 0–0.2)
BASOPHILS NFR BLD AUTO: 0.6 % — SIGNIFICANT CHANGE UP (ref 0–1)
BILIRUB SERPL-MCNC: 0.3 MG/DL — SIGNIFICANT CHANGE UP (ref 0.2–1.2)
BILIRUB UR-MCNC: NEGATIVE — SIGNIFICANT CHANGE UP
BUN SERPL-MCNC: 16 MG/DL — SIGNIFICANT CHANGE UP (ref 10–20)
CALCIUM SERPL-MCNC: 9.1 MG/DL — SIGNIFICANT CHANGE UP (ref 8.4–10.5)
CHLORIDE SERPL-SCNC: 102 MMOL/L — SIGNIFICANT CHANGE UP (ref 98–110)
CO2 SERPL-SCNC: 27 MMOL/L — SIGNIFICANT CHANGE UP (ref 17–32)
COLOR SPEC: SIGNIFICANT CHANGE UP
CREAT SERPL-MCNC: 0.8 MG/DL — SIGNIFICANT CHANGE UP (ref 0.7–1.5)
DIFF PNL FLD: NEGATIVE — SIGNIFICANT CHANGE UP
EGFR: 85 ML/MIN/1.73M2 — SIGNIFICANT CHANGE UP
EGFR: 85 ML/MIN/1.73M2 — SIGNIFICANT CHANGE UP
EOSINOPHIL # BLD AUTO: 0.35 K/UL — SIGNIFICANT CHANGE UP (ref 0–0.7)
EOSINOPHIL NFR BLD AUTO: 4.2 % — SIGNIFICANT CHANGE UP (ref 0–8)
GLUCOSE SERPL-MCNC: 91 MG/DL — SIGNIFICANT CHANGE UP (ref 70–99)
GLUCOSE UR QL: NEGATIVE MG/DL — SIGNIFICANT CHANGE UP
HCT VFR BLD CALC: 43.4 % — SIGNIFICANT CHANGE UP (ref 42–52)
HGB BLD-MCNC: 14.3 G/DL — SIGNIFICANT CHANGE UP (ref 14–18)
IMM GRANULOCYTES NFR BLD AUTO: 0.5 % — HIGH (ref 0.1–0.3)
KETONES UR-MCNC: NEGATIVE MG/DL — SIGNIFICANT CHANGE UP
LEUKOCYTE ESTERASE UR-ACNC: NEGATIVE — SIGNIFICANT CHANGE UP
LYMPHOCYTES # BLD AUTO: 1.57 K/UL — SIGNIFICANT CHANGE UP (ref 1.2–3.4)
LYMPHOCYTES # BLD AUTO: 18.7 % — LOW (ref 20.5–51.1)
MCHC RBC-ENTMCNC: 30.6 PG — SIGNIFICANT CHANGE UP (ref 27–31)
MCHC RBC-ENTMCNC: 32.9 G/DL — SIGNIFICANT CHANGE UP (ref 32–37)
MCV RBC AUTO: 92.9 FL — SIGNIFICANT CHANGE UP (ref 80–94)
MONOCYTES # BLD AUTO: 0.89 K/UL — HIGH (ref 0.1–0.6)
MONOCYTES NFR BLD AUTO: 10.6 % — HIGH (ref 1.7–9.3)
NEUTROPHILS # BLD AUTO: 5.49 K/UL — SIGNIFICANT CHANGE UP (ref 1.4–6.5)
NEUTROPHILS NFR BLD AUTO: 65.4 % — SIGNIFICANT CHANGE UP (ref 42.2–75.2)
NITRITE UR-MCNC: NEGATIVE — SIGNIFICANT CHANGE UP
NRBC # BLD: 0 /100 WBCS — SIGNIFICANT CHANGE UP (ref 0–0)
NRBC BLD-RTO: 0 /100 WBCS — SIGNIFICANT CHANGE UP (ref 0–0)
PH UR: 5 — SIGNIFICANT CHANGE UP (ref 5–8)
PLATELET # BLD AUTO: 248 K/UL — SIGNIFICANT CHANGE UP (ref 130–400)
PMV BLD: 10 FL — SIGNIFICANT CHANGE UP (ref 7.4–10.4)
POTASSIUM SERPL-MCNC: 3.7 MMOL/L — SIGNIFICANT CHANGE UP (ref 3.5–5)
POTASSIUM SERPL-SCNC: 3.7 MMOL/L — SIGNIFICANT CHANGE UP (ref 3.5–5)
PROT SERPL-MCNC: 6.4 G/DL — SIGNIFICANT CHANGE UP (ref 6–8)
PROT UR-MCNC: SIGNIFICANT CHANGE UP MG/DL
RBC # BLD: 4.67 M/UL — LOW (ref 4.7–6.1)
RBC # FLD: 13.8 % — SIGNIFICANT CHANGE UP (ref 11.5–14.5)
SODIUM SERPL-SCNC: 139 MMOL/L — SIGNIFICANT CHANGE UP (ref 135–146)
SP GR SPEC: >1.03 — HIGH (ref 1–1.03)
UROBILINOGEN FLD QL: 1 MG/DL — SIGNIFICANT CHANGE UP (ref 0.2–1)
WBC # BLD: 8.39 K/UL — SIGNIFICANT CHANGE UP (ref 4.8–10.8)
WBC # FLD AUTO: 8.39 K/UL — SIGNIFICANT CHANGE UP (ref 4.8–10.8)

## 2024-12-14 PROCEDURE — 71045 X-RAY EXAM CHEST 1 VIEW: CPT

## 2024-12-14 PROCEDURE — 81003 URINALYSIS AUTO W/O SCOPE: CPT

## 2024-12-14 PROCEDURE — 70450 CT HEAD/BRAIN W/O DYE: CPT | Mod: 26,MC

## 2024-12-14 PROCEDURE — 96374 THER/PROPH/DIAG INJ IV PUSH: CPT

## 2024-12-14 PROCEDURE — 71045 X-RAY EXAM CHEST 1 VIEW: CPT | Mod: 26

## 2024-12-14 PROCEDURE — 99284 EMERGENCY DEPT VISIT MOD MDM: CPT | Mod: 25

## 2024-12-14 PROCEDURE — 99285 EMERGENCY DEPT VISIT HI MDM: CPT

## 2024-12-14 PROCEDURE — 80053 COMPREHEN METABOLIC PANEL: CPT

## 2024-12-14 PROCEDURE — 87086 URINE CULTURE/COLONY COUNT: CPT

## 2024-12-14 PROCEDURE — 85025 COMPLETE CBC W/AUTO DIFF WBC: CPT

## 2024-12-14 PROCEDURE — 94640 AIRWAY INHALATION TREATMENT: CPT

## 2024-12-14 PROCEDURE — 70450 CT HEAD/BRAIN W/O DYE: CPT | Mod: MC

## 2024-12-14 RX ORDER — IPRATROPIUM BROMIDE AND ALBUTEROL SULFATE .5; 2.5 MG/3ML; MG/3ML
3 SOLUTION RESPIRATORY (INHALATION) ONCE
Refills: 0 | Status: COMPLETED | OUTPATIENT
Start: 2024-12-14 | End: 2024-12-14

## 2024-12-14 RX ORDER — METHYLPREDNISOLONE ACETATE 80 MG/ML
125 INJECTION, SUSPENSION INTRA-ARTICULAR; INTRALESIONAL; INTRAMUSCULAR; SOFT TISSUE ONCE
Refills: 0 | Status: COMPLETED | OUTPATIENT
Start: 2024-12-14 | End: 2024-12-14

## 2024-12-14 RX ADMIN — METHYLPREDNISOLONE ACETATE 125 MILLIGRAM(S): 80 INJECTION, SUSPENSION INTRA-ARTICULAR; INTRALESIONAL; INTRAMUSCULAR; SOFT TISSUE at 12:39

## 2024-12-14 RX ADMIN — IPRATROPIUM BROMIDE AND ALBUTEROL SULFATE 3 MILLILITER(S): .5; 2.5 SOLUTION RESPIRATORY (INHALATION) at 12:39

## 2024-12-18 ENCOUNTER — RX RENEWAL (OUTPATIENT)
Age: 88
End: 2024-12-18

## 2024-12-18 LAB
CULTURE RESULTS: ABNORMAL
SPECIMEN SOURCE: SIGNIFICANT CHANGE UP

## 2024-12-19 RX ORDER — CEFUROXIME SODIUM 1.5 G
1 VIAL (EA) INJECTION
Qty: 14 | Refills: 0
Start: 2024-12-19 | End: 2024-12-25

## 2024-12-19 RX ORDER — CEFUROXIME SODIUM 1.5 G
1 VIAL (EA) INJECTION
Refills: 0
Start: 2024-12-19

## 2024-12-19 RX ORDER — SULFAMETHOXAZOLE/TRIMETHOPRIM 800-160 MG
1 TABLET ORAL
Refills: 0
Start: 2024-12-19

## 2025-01-24 NOTE — ED ADULT NURSE NOTE - NSFALLRSKASSESSDT_ED_ALL_ED
Notified patient lab results in detail and referred to hematology.  PT and PTT are prolonged 27-Aug-2019 19:41

## 2025-01-27 ENCOUNTER — APPOINTMENT (OUTPATIENT)
Dept: CARDIOLOGY | Facility: CLINIC | Age: 89
End: 2025-01-27
Payer: MEDICARE

## 2025-01-27 ENCOUNTER — OUTPATIENT (OUTPATIENT)
Dept: OUTPATIENT SERVICES | Facility: HOSPITAL | Age: 89
LOS: 1 days | End: 2025-01-27
Payer: COMMERCIAL

## 2025-01-27 VITALS
HEART RATE: 65 BPM | HEIGHT: 62 IN | WEIGHT: 154 LBS | OXYGEN SATURATION: 97 % | SYSTOLIC BLOOD PRESSURE: 130 MMHG | DIASTOLIC BLOOD PRESSURE: 70 MMHG | BODY MASS INDEX: 28.34 KG/M2

## 2025-01-27 DIAGNOSIS — G47.33 OBSTRUCTIVE SLEEP APNEA (ADULT) (PEDIATRIC): ICD-10-CM

## 2025-01-27 DIAGNOSIS — R06.02 SHORTNESS OF BREATH: ICD-10-CM

## 2025-01-27 DIAGNOSIS — I10 ESSENTIAL (PRIMARY) HYPERTENSION: ICD-10-CM

## 2025-01-27 DIAGNOSIS — R42 DIZZINESS AND GIDDINESS: ICD-10-CM

## 2025-01-27 DIAGNOSIS — K02.9 DENTAL CARIES, UNSPECIFIED: ICD-10-CM

## 2025-01-27 DIAGNOSIS — R06.09 OTHER FORMS OF DYSPNEA: ICD-10-CM

## 2025-01-27 DIAGNOSIS — I25.10 ATHEROSCLEROTIC HEART DISEASE OF NATIVE CORONARY ARTERY W/OUT ANGINA PECTORIS: ICD-10-CM

## 2025-01-27 DIAGNOSIS — I35.0 NONRHEUMATIC AORTIC (VALVE) STENOSIS: ICD-10-CM

## 2025-01-27 DIAGNOSIS — Z98.890 OTHER SPECIFIED POSTPROCEDURAL STATES: Chronic | ICD-10-CM

## 2025-01-27 DIAGNOSIS — Z86.39 PERSONAL HISTORY OF OTHER ENDOCRINE, NUTRITIONAL AND METABOLIC DISEASE: ICD-10-CM

## 2025-01-27 PROCEDURE — G2211 COMPLEX E/M VISIT ADD ON: CPT

## 2025-01-27 PROCEDURE — D0140: CPT

## 2025-01-27 PROCEDURE — 99214 OFFICE O/P EST MOD 30 MIN: CPT

## 2025-01-27 PROCEDURE — D0220: CPT

## 2025-01-27 PROCEDURE — D0230: CPT

## 2025-01-27 RX ORDER — CEFUROXIME AXETIL 500 MG/1
500 TABLET, FILM COATED ORAL
Qty: 14 | Refills: 0 | Status: ACTIVE | COMMUNITY
Start: 2024-12-19

## 2025-01-27 RX ORDER — RIVASTIGMINE TARTRATE 6 MG/1
6 CAPSULE ORAL
Qty: 180 | Refills: 0 | Status: ACTIVE | COMMUNITY
Start: 2024-12-05

## 2025-01-27 RX ORDER — QUETIAPINE FUMARATE 25 MG/1
25 TABLET ORAL
Qty: 90 | Refills: 0 | Status: ACTIVE | COMMUNITY
Start: 2025-01-17

## 2025-01-28 RX ORDER — PROPRANOLOL HYDROCHLORIDE 20 MG/1
20 TABLET ORAL DAILY
Qty: 90 | Refills: 3 | Status: ACTIVE | COMMUNITY
Start: 2024-08-24 | End: 1900-01-01

## 2025-02-03 DIAGNOSIS — G50.1 ATYPICAL FACIAL PAIN: ICD-10-CM

## 2025-02-21 ENCOUNTER — OUTPATIENT (OUTPATIENT)
Dept: OUTPATIENT SERVICES | Facility: HOSPITAL | Age: 89
LOS: 1 days | End: 2025-02-21
Payer: COMMERCIAL

## 2025-02-21 DIAGNOSIS — Z01.20 ENCOUNTER FOR DENTAL EXAMINATION AND CLEANING WITHOUT ABNORMAL FINDINGS: ICD-10-CM

## 2025-02-21 DIAGNOSIS — Z98.890 OTHER SPECIFIED POSTPROCEDURAL STATES: Chronic | ICD-10-CM

## 2025-02-21 PROCEDURE — D0330: CPT

## 2025-02-21 PROCEDURE — D0230: CPT

## 2025-02-21 PROCEDURE — D0120: CPT

## 2025-02-21 PROCEDURE — D0274: CPT

## 2025-02-24 DIAGNOSIS — Z01.21 ENCOUNTER FOR DENTAL EXAMINATION AND CLEANING WITH ABNORMAL FINDINGS: ICD-10-CM

## 2025-03-10 ENCOUNTER — APPOINTMENT (OUTPATIENT)
Dept: GASTROENTEROLOGY | Facility: CLINIC | Age: 89
End: 2025-03-10

## 2025-03-10 DIAGNOSIS — R19.7 DIARRHEA, UNSPECIFIED: ICD-10-CM

## 2025-03-10 PROCEDURE — 99203 OFFICE O/P NEW LOW 30 MIN: CPT | Mod: 93

## 2025-03-12 ENCOUNTER — OUTPATIENT (OUTPATIENT)
Dept: OUTPATIENT SERVICES | Facility: HOSPITAL | Age: 89
LOS: 1 days | End: 2025-03-12
Payer: COMMERCIAL

## 2025-03-12 DIAGNOSIS — Z98.890 OTHER SPECIFIED POSTPROCEDURAL STATES: Chronic | ICD-10-CM

## 2025-03-12 DIAGNOSIS — Z01.20 ENCOUNTER FOR DENTAL EXAMINATION AND CLEANING WITHOUT ABNORMAL FINDINGS: ICD-10-CM

## 2025-03-12 PROCEDURE — D1110: CPT

## 2025-03-13 ENCOUNTER — INPATIENT (INPATIENT)
Facility: HOSPITAL | Age: 89
LOS: 0 days | Discharge: HOME HEALTH | DRG: 93 | End: 2025-03-14
Attending: STUDENT IN AN ORGANIZED HEALTH CARE EDUCATION/TRAINING PROGRAM | Admitting: HOSPITALIST
Payer: MEDICARE

## 2025-03-13 VITALS
RESPIRATION RATE: 18 BRPM | SYSTOLIC BLOOD PRESSURE: 104 MMHG | DIASTOLIC BLOOD PRESSURE: 63 MMHG | HEART RATE: 68 BPM | OXYGEN SATURATION: 99 % | TEMPERATURE: 98 F

## 2025-03-13 DIAGNOSIS — Z98.890 OTHER SPECIFIED POSTPROCEDURAL STATES: Chronic | ICD-10-CM

## 2025-03-13 DIAGNOSIS — G47.51 CONFUSIONAL AROUSALS: ICD-10-CM

## 2025-03-13 LAB
ALBUMIN SERPL ELPH-MCNC: 3.9 G/DL — SIGNIFICANT CHANGE UP (ref 3.5–5.2)
ALP SERPL-CCNC: 90 U/L — SIGNIFICANT CHANGE UP (ref 30–115)
ALT FLD-CCNC: 20 U/L — SIGNIFICANT CHANGE UP (ref 0–41)
ANION GAP SERPL CALC-SCNC: 8 MMOL/L — SIGNIFICANT CHANGE UP (ref 7–14)
APPEARANCE UR: CLEAR — SIGNIFICANT CHANGE UP
AST SERPL-CCNC: 19 U/L — SIGNIFICANT CHANGE UP (ref 0–41)
BASOPHILS # BLD AUTO: 0.09 K/UL — SIGNIFICANT CHANGE UP (ref 0–0.2)
BASOPHILS NFR BLD AUTO: 0.9 % — SIGNIFICANT CHANGE UP (ref 0–1)
BILIRUB SERPL-MCNC: 0.3 MG/DL — SIGNIFICANT CHANGE UP (ref 0.2–1.2)
BILIRUB UR-MCNC: NEGATIVE — SIGNIFICANT CHANGE UP
BUN SERPL-MCNC: 29 MG/DL — HIGH (ref 10–20)
CALCIUM SERPL-MCNC: 9.1 MG/DL — SIGNIFICANT CHANGE UP (ref 8.4–10.5)
CHLORIDE SERPL-SCNC: 105 MMOL/L — SIGNIFICANT CHANGE UP (ref 98–110)
CO2 SERPL-SCNC: 26 MMOL/L — SIGNIFICANT CHANGE UP (ref 17–32)
COLOR SPEC: SIGNIFICANT CHANGE UP
CREAT SERPL-MCNC: 1.2 MG/DL — SIGNIFICANT CHANGE UP (ref 0.7–1.5)
DIFF PNL FLD: NEGATIVE — SIGNIFICANT CHANGE UP
EGFR: 57 ML/MIN/1.73M2 — LOW
EGFR: 57 ML/MIN/1.73M2 — LOW
EOSINOPHIL # BLD AUTO: 0.51 K/UL — SIGNIFICANT CHANGE UP (ref 0–0.7)
EOSINOPHIL NFR BLD AUTO: 5.1 % — SIGNIFICANT CHANGE UP (ref 0–8)
ETHANOL SERPL-MCNC: <10 MG/DL — SIGNIFICANT CHANGE UP
FLUAV AG NPH QL: SIGNIFICANT CHANGE UP
FLUBV AG NPH QL: SIGNIFICANT CHANGE UP
GLUCOSE SERPL-MCNC: 105 MG/DL — HIGH (ref 70–99)
GLUCOSE UR QL: NEGATIVE MG/DL — SIGNIFICANT CHANGE UP
HCT VFR BLD CALC: 41.8 % — LOW (ref 42–52)
HGB BLD-MCNC: 13.9 G/DL — LOW (ref 14–18)
IMM GRANULOCYTES NFR BLD AUTO: 0.3 % — SIGNIFICANT CHANGE UP (ref 0.1–0.3)
KETONES UR-MCNC: ABNORMAL MG/DL
LEUKOCYTE ESTERASE UR-ACNC: NEGATIVE — SIGNIFICANT CHANGE UP
LYMPHOCYTES # BLD AUTO: 1.99 K/UL — SIGNIFICANT CHANGE UP (ref 1.2–3.4)
LYMPHOCYTES # BLD AUTO: 20 % — LOW (ref 20.5–51.1)
MCHC RBC-ENTMCNC: 31.2 PG — HIGH (ref 27–31)
MCHC RBC-ENTMCNC: 33.3 G/DL — SIGNIFICANT CHANGE UP (ref 32–37)
MCV RBC AUTO: 93.9 FL — SIGNIFICANT CHANGE UP (ref 80–94)
MONOCYTES # BLD AUTO: 1.13 K/UL — HIGH (ref 0.1–0.6)
MONOCYTES NFR BLD AUTO: 11.4 % — HIGH (ref 1.7–9.3)
NEUTROPHILS # BLD AUTO: 6.19 K/UL — SIGNIFICANT CHANGE UP (ref 1.4–6.5)
NEUTROPHILS NFR BLD AUTO: 62.3 % — SIGNIFICANT CHANGE UP (ref 42.2–75.2)
NITRITE UR-MCNC: NEGATIVE — SIGNIFICANT CHANGE UP
NRBC BLD AUTO-RTO: 0 /100 WBCS — SIGNIFICANT CHANGE UP (ref 0–0)
PH UR: 5 — SIGNIFICANT CHANGE UP (ref 5–8)
PLATELET # BLD AUTO: 192 K/UL — SIGNIFICANT CHANGE UP (ref 130–400)
PMV BLD: 9.6 FL — SIGNIFICANT CHANGE UP (ref 7.4–10.4)
POTASSIUM SERPL-MCNC: 4.2 MMOL/L — SIGNIFICANT CHANGE UP (ref 3.5–5)
POTASSIUM SERPL-SCNC: 4.2 MMOL/L — SIGNIFICANT CHANGE UP (ref 3.5–5)
PROT SERPL-MCNC: 5.8 G/DL — LOW (ref 6–8)
PROT UR-MCNC: SIGNIFICANT CHANGE UP MG/DL
RBC # BLD: 4.45 M/UL — LOW (ref 4.7–6.1)
RBC # FLD: 13.7 % — SIGNIFICANT CHANGE UP (ref 11.5–14.5)
RSV RNA NPH QL NAA+NON-PROBE: SIGNIFICANT CHANGE UP
SARS-COV-2 RNA SPEC QL NAA+PROBE: SIGNIFICANT CHANGE UP
SODIUM SERPL-SCNC: 139 MMOL/L — SIGNIFICANT CHANGE UP (ref 135–146)
SP GR SPEC: 1.03 — SIGNIFICANT CHANGE UP (ref 1–1.03)
UROBILINOGEN FLD QL: 1 MG/DL — SIGNIFICANT CHANGE UP (ref 0.2–1)
WBC # BLD: 9.94 K/UL — SIGNIFICANT CHANGE UP (ref 4.8–10.8)
WBC # FLD AUTO: 9.94 K/UL — SIGNIFICANT CHANGE UP (ref 4.8–10.8)

## 2025-03-13 PROCEDURE — 99233 SBSQ HOSP IP/OBS HIGH 50: CPT

## 2025-03-13 PROCEDURE — 80048 BASIC METABOLIC PNL TOTAL CA: CPT

## 2025-03-13 PROCEDURE — 86780 TREPONEMA PALLIDUM: CPT

## 2025-03-13 PROCEDURE — 36415 COLL VENOUS BLD VENIPUNCTURE: CPT

## 2025-03-13 PROCEDURE — 74177 CT ABD & PELVIS W/CONTRAST: CPT | Mod: 26

## 2025-03-13 PROCEDURE — 99285 EMERGENCY DEPT VISIT HI MDM: CPT

## 2025-03-13 PROCEDURE — 84443 ASSAY THYROID STIM HORMONE: CPT

## 2025-03-13 PROCEDURE — 85027 COMPLETE CBC AUTOMATED: CPT

## 2025-03-13 PROCEDURE — 74018 RADEX ABDOMEN 1 VIEW: CPT

## 2025-03-13 PROCEDURE — 71045 X-RAY EXAM CHEST 1 VIEW: CPT | Mod: 26

## 2025-03-13 PROCEDURE — 84436 ASSAY OF TOTAL THYROXINE: CPT

## 2025-03-13 PROCEDURE — 82607 VITAMIN B-12: CPT

## 2025-03-13 PROCEDURE — 97162 PT EVAL MOD COMPLEX 30 MIN: CPT | Mod: GP

## 2025-03-13 PROCEDURE — 82746 ASSAY OF FOLIC ACID SERUM: CPT

## 2025-03-13 PROCEDURE — 87389 HIV-1 AG W/HIV-1&-2 AB AG IA: CPT

## 2025-03-13 PROCEDURE — 84480 ASSAY TRIIODOTHYRONINE (T3): CPT

## 2025-03-13 PROCEDURE — 80307 DRUG TEST PRSMV CHEM ANLYZR: CPT

## 2025-03-13 PROCEDURE — 70450 CT HEAD/BRAIN W/O DYE: CPT | Mod: 26

## 2025-03-13 RX ORDER — NICOTINE POLACRILEX 4 MG/1
1 GUM, CHEWING ORAL DAILY
Refills: 0 | Status: DISCONTINUED | OUTPATIENT
Start: 2025-03-13 | End: 2025-03-14

## 2025-03-13 RX ORDER — QUETIAPINE FUMARATE 25 MG/1
25 TABLET ORAL DAILY
Refills: 0 | Status: DISCONTINUED | OUTPATIENT
Start: 2025-03-13 | End: 2025-03-14

## 2025-03-13 RX ORDER — ASPIRIN 325 MG
81 TABLET ORAL DAILY
Refills: 0 | Status: DISCONTINUED | OUTPATIENT
Start: 2025-03-13 | End: 2025-03-14

## 2025-03-13 RX ORDER — LATANOPROST PF 0.05 MG/ML
1 SOLUTION/ DROPS OPHTHALMIC AT BEDTIME
Refills: 0 | Status: DISCONTINUED | OUTPATIENT
Start: 2025-03-13 | End: 2025-03-14

## 2025-03-13 RX ORDER — DILTIAZEM HYDROCHLORIDE 240 MG/1
120 TABLET, EXTENDED RELEASE ORAL DAILY
Refills: 0 | Status: DISCONTINUED | OUTPATIENT
Start: 2025-03-14 | End: 2025-03-14

## 2025-03-13 RX ORDER — ASPIRIN 325 MG
1 TABLET ORAL
Refills: 0 | DISCHARGE

## 2025-03-13 RX ORDER — ROSUVASTATIN CALCIUM 20 MG/1
20 TABLET, FILM COATED ORAL AT BEDTIME
Refills: 0 | Status: DISCONTINUED | OUTPATIENT
Start: 2025-03-13 | End: 2025-03-14

## 2025-03-13 RX ORDER — LATANOPROST PF 0.05 MG/ML
1 SOLUTION/ DROPS OPHTHALMIC
Refills: 0 | DISCHARGE

## 2025-03-13 RX ORDER — ACETAMINOPHEN 500 MG/5ML
650 LIQUID (ML) ORAL EVERY 6 HOURS
Refills: 0 | Status: DISCONTINUED | OUTPATIENT
Start: 2025-03-13 | End: 2025-03-14

## 2025-03-13 RX ORDER — HEPARIN SODIUM 1000 [USP'U]/ML
5000 INJECTION INTRAVENOUS; SUBCUTANEOUS EVERY 12 HOURS
Refills: 0 | Status: DISCONTINUED | OUTPATIENT
Start: 2025-03-13 | End: 2025-03-14

## 2025-03-13 RX ORDER — LISINOPRIL 5 MG/1
1 TABLET ORAL
Refills: 0 | DISCHARGE

## 2025-03-13 RX ORDER — DILTIAZEM HYDROCHLORIDE 240 MG/1
1 TABLET, EXTENDED RELEASE ORAL
Refills: 0 | DISCHARGE

## 2025-03-13 RX ORDER — TAMSULOSIN HYDROCHLORIDE 0.4 MG/1
0.4 CAPSULE ORAL AT BEDTIME
Refills: 0 | Status: DISCONTINUED | OUTPATIENT
Start: 2025-03-13 | End: 2025-03-14

## 2025-03-13 RX ORDER — ISOSORBIDE MONONITRATE 60 MG/1
30 TABLET, EXTENDED RELEASE ORAL DAILY
Refills: 0 | Status: DISCONTINUED | OUTPATIENT
Start: 2025-03-13 | End: 2025-03-14

## 2025-03-13 RX ORDER — LORAZEPAM 4 MG/ML
2 VIAL (ML) INJECTION
Refills: 0 | Status: DISCONTINUED | OUTPATIENT
Start: 2025-03-13 | End: 2025-03-14

## 2025-03-13 RX ORDER — QUETIAPINE FUMARATE 25 MG/1
1 TABLET ORAL
Refills: 0 | DISCHARGE

## 2025-03-13 RX ORDER — FOLIC ACID 1 MG/1
1 TABLET ORAL DAILY
Refills: 0 | Status: DISCONTINUED | OUTPATIENT
Start: 2025-03-13 | End: 2025-03-14

## 2025-03-13 RX ORDER — LISINOPRIL 5 MG/1
10 TABLET ORAL DAILY
Refills: 0 | Status: DISCONTINUED | OUTPATIENT
Start: 2025-03-14 | End: 2025-03-14

## 2025-03-13 RX ORDER — PROPRANOLOL HCL 60 MG
1 TABLET ORAL
Refills: 0 | DISCHARGE

## 2025-03-13 RX ORDER — HALOPERIDOL 10 MG/1
2.5 TABLET ORAL ONCE
Refills: 0 | Status: COMPLETED | OUTPATIENT
Start: 2025-03-13 | End: 2025-03-13

## 2025-03-13 RX ADMIN — Medication 1 DOSE(S): at 21:47

## 2025-03-13 RX ADMIN — TAMSULOSIN HYDROCHLORIDE 0.4 MILLIGRAM(S): 0.4 CAPSULE ORAL at 21:49

## 2025-03-13 RX ADMIN — ROSUVASTATIN CALCIUM 20 MILLIGRAM(S): 20 TABLET, FILM COATED ORAL at 21:49

## 2025-03-13 RX ADMIN — HALOPERIDOL 2.5 MILLIGRAM(S): 10 TABLET ORAL at 16:14

## 2025-03-13 RX ADMIN — LATANOPROST PF 1 DROP(S): 0.05 SOLUTION/ DROPS OPHTHALMIC at 22:27

## 2025-03-13 NOTE — ED PROVIDER NOTE - WHICH SHOWED
Normal sinus rhythm 68 with interventricular conduction delay no ST elevation; chest x-ray no acute findings

## 2025-03-13 NOTE — PATIENT PROFILE ADULT - FALL HARM RISK - HARM RISK INTERVENTIONS
Assistance OOB with selected safe patient handling equipment/Communicate Risk of Fall with Harm to all staff/Discuss with provider need for PT consult/Monitor gait and stability/Provide patient with walking aids - walker, cane, crutches/Reinforce activity limits and safety measures with patient and family/Tailored Fall Risk Interventions/Use of alarms - bed, chair and/or voice tab/Visual Cue: Yellow wristband and red socks/Bed in lowest position, wheels locked, appropriate side rails in place/Call bell, personal items and telephone in reach/Instruct patient to call for assistance before getting out of bed or chair/Non-slip footwear when patient is out of bed/[place] to call system/Physically safe environment - no spills, clutter or unnecessary equipment/Purposeful Proactive Rounding/Room/bathroom lighting operational, light cord in reach

## 2025-03-13 NOTE — H&P ADULT - NSHPPHYSICALEXAM_GEN_ALL_CORE
VITALS:   T(C): 36.7 (03-13-25 @ 09:57), Max: 36.7 (03-13-25 @ 09:57)  HR: 68 (03-13-25 @ 09:57) (68 - 68)  BP: 104/63 (03-13-25 @ 09:57) (104/63 - 104/63)  RR: 18 (03-13-25 @ 09:57) (18 - 18)  SpO2: 99% (03-13-25 @ 09:57) (99% - 99%)    GENERAL: NAD, lying in bed comfortably  HEAD:  Atraumatic, Normocephalic  EYES: EOMI  ENT: Moist mucous membranes  NECK: Supple  CHEST/LUNG: Clear to auscultation bilaterally; No wheezing, or rubs  HEART: Regular rate and rhythm; No murmurs, rubs, or gallops  ABDOMEN: Bowel sounds present; Soft, Nontender, Nondistended  EXTREMITIES:  No LE edema bilaterally  NERVOUS SYSTEM:  Alert & Oriented to person, speech clear  MSK: FROM all 4 extremities, full and equal strength  SKIN: diffuse patches of dry skin, no rashes or lesions

## 2025-03-13 NOTE — H&P ADULT - HISTORY OF PRESENT ILLNESS
89-year-old male history of HTN, HLD, dementia, GERD, asthma, chronic diarrhea, BPH presents to ED with worsening confusion since last evening. Wife denies pt reporting any ever, chills, nausea, vomiting, urinary frequency, urgency, dysuria.  She does admit pt drinking 1-2 glasses/day of scotch for over 20 years.

## 2025-03-13 NOTE — ED PROVIDER NOTE - ATTENDING APP SHARED VISIT CONTRIBUTION OF CARE
89-year-old male history of hypertension, diabetes, heart disease, dementia usually alert and oriented x 1 brought in for evaluation by his wife, since last night his been wandering reaching for things appearing to respond to external stimuli and saying nonsensical things, is on stable dose of all of his medications including the quetiapine 25 mg he took last night, will patient has dementia wife states this is never happened like this before, no falls, had recent dental procedure and was on ampicillin briefly, denies any kind of pain medications, no fevers reported, on exam vitals appreciated, ambulatory 89-year-old male who is alert and oriented x 1 and initially cooperative, head NC/AT, PERRLA, conjunctive pink, neck supple, cor regular, lungs clear, abdomen normal active bowel sounds, soft but slightly distended and tympanic, nontender, calves nontender, pulse equal, neurologically nonfocal, labs and studies appreciated, patient became agitated in the ED requiring chemical restraint, no evidence of UTI though culture sent, wife is unable to take him home though ultimate goal is for him to return home, will admit for neurology evaluation

## 2025-03-13 NOTE — ED PROVIDER NOTE - OBJECTIVE STATEMENT
History from wife  89-year-old male history of hypertension, dementia, GERD, dyslipidemia brought in by wife for confusion for the past 15 hours.  No fevers, chills, nausea, vomiting, constipation, diarrhea.  Patient has chronic back pains. Wife concerned patient has a UTI.

## 2025-03-13 NOTE — H&P ADULT - NS ATTEND AMEND GEN_ALL_CORE FT
pt seen and examined on day of admission  3/13  wife  at bedside , agreeing with plan   changes to plan made above as necessary   plan of care discussed with support staff

## 2025-03-13 NOTE — H&P ADULT - NSICDXPASTSURGICALHX_GEN_ALL_CORE_FT
Complete metabolic panel, lipids   PAST SURGICAL HISTORY:  H/O hernia repair     History of repair of hip fracture

## 2025-03-13 NOTE — ED ADULT NURSE NOTE - NSFALLRISKINTERV_ED_ALL_ED
Assistance OOB with selected safe patient handling equipment if applicable/Assistance with ambulation/Communicate fall risk and risk factors to all staff, patient, and family/Monitor gait and stability/Provide visual cue: yellow wristband, yellow gown, etc/Reinforce activity limits and safety measures with patient and family/Call bell, personal items and telephone in reach/Instruct patient to call for assistance before getting out of bed/chair/stretcher/Non-slip footwear applied when patient is off stretcher/[place] to call system/Physically safe environment - no spills, clutter or unnecessary equipment/Purposeful Proactive Rounding/Room/bathroom lighting operational, light cord in reach

## 2025-03-13 NOTE — H&P ADULT - NSHPSOCIALHISTORY_GEN_ALL_CORE
Tobacco use: smokes 1/2 pack cigarettes a day  EtOH use: 1-2 glasses or scotch/day  Illicit drug use: denies  Marital Status: lives with wife

## 2025-03-13 NOTE — H&P ADULT - NSHPLABSRESULTS_GEN_ALL_CORE
13.9   9.94  )-----------( 192      ( 13 Mar 2025 11:40 )             41.8     139  |  105  |  29[H]  ----------------------------<  105[H]  4.2   |  26  |  1.2    Ca    9.1      13 Mar 2025 11:40    TPro  5.8[L]  /  Alb  3.9  /  TBili  0.3  /  DBili  x   /  AST  19  /  ALT  20  /  AlkPhos  90          Urinalysis Basic - ( 13 Mar 2025 12:36 )  Color: Dark Yellow / Appearance: Clear / S.026 / pH: x  Gluc: x / Ketone: Trace mg/dL  / Bili: Negative / Urobili: 1.0 mg/dL   Blood: x / Protein: Trace mg/dL / Nitrite: Negative   Leuk Esterase: Negative / RBC: x / WBC x   Sq Epi: x / Non Sq Epi: x / Bacteria: x      CAPILLARY BLOOD GLUCOSE  POCT Blood Glucose.: 100 mg/dL (13 Mar 2025 10:03)    RADIOLOGY:    CT Abdomen and Pelvis w/ IV Cont (25 @ 16:01)   IMPRESSION:  1. No definite evidence of acute/inflammatory process within the abdomen   or pelvis.  2. Additional incidental/chronic findings, as detailed above.    TRISTON LONDONO MD; Attending Radiologist  This document has been electronically signed. Mar 13 2025  4:24PM    CT Head No Cont (25 @ 11:03)     IMPRESSION:  1.  No evidence of acute intracranial pathology. Stable exam.  2.  Moderate chronic microvascular-type changes.    MARITZA MEDINA MD; Attending Radiologist  This document has been electronically signed. Mar 13 2025 11:18AM      Xray Chest 1 View- PORTABLE-Urgent (Xray Chest 1 View- PORTABLE-Urgent .) (25 @ 12:54)   IMPRESSION:  No radiographic evidence of acute cardiopulmonary disease.  MEI MONGE MD; Attending Radiologist  This document has been electronically signed. Mar 13 2025  1:15PM

## 2025-03-13 NOTE — H&P ADULT - ASSESSMENT
89-year-old male history of HTN, HLD, dementia, asthma, GERD, chronic diarrhea s/p hemorrhoidectomy, BPH presents to ED with worsening confusion since last evening.    # Metabolic encephalopathy  - admit to medicine  - UA negative  - RVP negative  - CT C/A/P with no acute findings  - CXR negative  - check TSH, T3/T4, folate, b12,   - constant observation for elopement  - PT eval  - supportive care  - bladder scan  - monitor I/Os    # Mild THOMPSON  - likely 2/2 dehydration  - gentle IVF hydration  - repeat BMP in AM    # Hx ETOH use  - check ETOH level  - thiamine supplement    # Hx Asthma  - continue home inhaler    # HTN  # HLD  - continue home medications    # Hx BPH  - Flomax    # Hx smoking  - nicotine patch    DVT ppx: heparin  GI ppx: ppi 89-year-old male history of HTN, HLD, dementia, asthma, GERD, chronic diarrhea s/p hemorrhoidectomy, BPH presents to ED with worsening confusion since last evening.    # Metabolic encephalopathy  # H/O Dementia   - admit to medicine  - UA negative/ CXR neg/ RVP negative/ CT C/A/P with no acute findings  - CXR negative  - TSH, T3/T4, folate, b12, thiamine level, HIV   - constant observation for elopement  - PT eval  - supportive care  - bladder scan  - monitor I/Os    # Mild THOMPSON  - likely 2/2 dehydration  - gentle IVF hydration  - repeat BMP in AM    # Hx ETOH use  - check ETOH level  - thiamine supplement  - addiction eval   - ciwa prn     # Hx Asthma  - continue home inhaler    # HTN  # HLD  - continue home medications    # Hx BPH  - Flomax    # Hx smoking  - nicotine patch    DVT ppx: heparin  GI ppx: ppi 89-year-old male history of HTN, HLD, dementia, asthma, GERD, chronic diarrhea s/p hemorrhoidectomy, BPH presents to ED with worsening confusion since last evening.    # Metabolic encephalopathy  # H/O Dementia   - admit to medicine  - UA negative/ CXR neg/ RVP negative/ CT C/A/P with no acute findings  - CXR negative  - TSH, T3/T4, folate, b12, thiamine level, HIV   - constant observation for elopement  - PT eval  - supportive care  - bladder scan  - monitor I/Os    # Mild THOMPSON  - likely 2/2 dehydration  - gentle IVF hydration  - repeat BMP in AM    # Hx ETOH use  - check ETOH level  - thiamine supplement  - addiction eval   - ciwa prn     # H/O Chronic diarrhea   -  per wife reports has 3-4 episodes daily for last 10 years   - has had multiple work up in the past which was negative   - monitor stool count     # Hx Asthma  - continue home inhaler    # HTN  # HLD  - continue home medications    # Hx BPH  - Flomax    # Hx smoking  - nicotine patch    DVT ppx: heparin  GI ppx: ppi

## 2025-03-14 ENCOUNTER — NON-APPOINTMENT (OUTPATIENT)
Age: 89
End: 2025-03-14

## 2025-03-14 ENCOUNTER — TRANSCRIPTION ENCOUNTER (OUTPATIENT)
Age: 89
End: 2025-03-14

## 2025-03-14 VITALS
OXYGEN SATURATION: 99 % | DIASTOLIC BLOOD PRESSURE: 66 MMHG | TEMPERATURE: 98 F | RESPIRATION RATE: 18 BRPM | HEART RATE: 77 BPM | SYSTOLIC BLOOD PRESSURE: 135 MMHG

## 2025-03-14 DIAGNOSIS — F10.10 ALCOHOL ABUSE, UNCOMPLICATED: ICD-10-CM

## 2025-03-14 DIAGNOSIS — Z01.21 ENCOUNTER FOR DENTAL EXAMINATION AND CLEANING WITH ABNORMAL FINDINGS: ICD-10-CM

## 2025-03-14 LAB
ANION GAP SERPL CALC-SCNC: 12 MMOL/L — SIGNIFICANT CHANGE UP (ref 7–14)
BUN SERPL-MCNC: 22 MG/DL — HIGH (ref 10–20)
CALCIUM SERPL-MCNC: 8.9 MG/DL — SIGNIFICANT CHANGE UP (ref 8.4–10.5)
CHLORIDE SERPL-SCNC: 104 MMOL/L — SIGNIFICANT CHANGE UP (ref 98–110)
CO2 SERPL-SCNC: 24 MMOL/L — SIGNIFICANT CHANGE UP (ref 17–32)
CREAT SERPL-MCNC: 1 MG/DL — SIGNIFICANT CHANGE UP (ref 0.7–1.5)
EGFR: 72 ML/MIN/1.73M2 — SIGNIFICANT CHANGE UP
EGFR: 72 ML/MIN/1.73M2 — SIGNIFICANT CHANGE UP
FOLATE SERPL-MCNC: 19.5 NG/ML — SIGNIFICANT CHANGE UP
GLUCOSE SERPL-MCNC: 96 MG/DL — SIGNIFICANT CHANGE UP (ref 70–99)
HCT VFR BLD CALC: 42.5 % — SIGNIFICANT CHANGE UP (ref 42–52)
HGB BLD-MCNC: 14.1 G/DL — SIGNIFICANT CHANGE UP (ref 14–18)
MCHC RBC-ENTMCNC: 30.8 PG — SIGNIFICANT CHANGE UP (ref 27–31)
MCHC RBC-ENTMCNC: 33.2 G/DL — SIGNIFICANT CHANGE UP (ref 32–37)
MCV RBC AUTO: 92.8 FL — SIGNIFICANT CHANGE UP (ref 80–94)
NRBC BLD AUTO-RTO: 0 /100 WBCS — SIGNIFICANT CHANGE UP (ref 0–0)
PLATELET # BLD AUTO: 198 K/UL — SIGNIFICANT CHANGE UP (ref 130–400)
PMV BLD: 10.1 FL — SIGNIFICANT CHANGE UP (ref 7.4–10.4)
POTASSIUM SERPL-MCNC: 3.8 MMOL/L — SIGNIFICANT CHANGE UP (ref 3.5–5)
POTASSIUM SERPL-SCNC: 3.8 MMOL/L — SIGNIFICANT CHANGE UP (ref 3.5–5)
RBC # BLD: 4.58 M/UL — LOW (ref 4.7–6.1)
RBC # FLD: 13.6 % — SIGNIFICANT CHANGE UP (ref 11.5–14.5)
SODIUM SERPL-SCNC: 140 MMOL/L — SIGNIFICANT CHANGE UP (ref 135–146)
T PALLIDUM AB TITR SER: NEGATIVE — SIGNIFICANT CHANGE UP
T3 SERPL-MCNC: 105 NG/DL — SIGNIFICANT CHANGE UP (ref 80–200)
T4 AB SER-ACNC: 6.8 UG/DL — SIGNIFICANT CHANGE UP (ref 4.6–12)
TSH SERPL-MCNC: 1 UIU/ML — SIGNIFICANT CHANGE UP (ref 0.27–4.2)
VIT B12 SERPL-MCNC: 310 PG/ML — SIGNIFICANT CHANGE UP (ref 232–1245)
WBC # BLD: 10.53 K/UL — SIGNIFICANT CHANGE UP (ref 4.8–10.8)
WBC # FLD AUTO: 10.53 K/UL — SIGNIFICANT CHANGE UP (ref 4.8–10.8)

## 2025-03-14 PROCEDURE — 74018 RADEX ABDOMEN 1 VIEW: CPT | Mod: 26

## 2025-03-14 PROCEDURE — 99239 HOSP IP/OBS DSCHRG MGMT >30: CPT

## 2025-03-14 PROCEDURE — 99221 1ST HOSP IP/OBS SF/LOW 40: CPT

## 2025-03-14 RX ORDER — FOLIC ACID 1 MG/1
1 TABLET ORAL
Qty: 30 | Refills: 0
Start: 2025-03-14 | End: 2025-04-12

## 2025-03-14 RX ORDER — FLUTICASONE FUROATE, UMECLIDINIUM BROMIDE AND VILANTEROL TRIFENATATE 100; 62.5; 25 UG/1; UG/1; UG/1
1 POWDER RESPIRATORY (INHALATION)
Refills: 0 | DISCHARGE

## 2025-03-14 RX ORDER — RIVASTIGMINE 13.3 MG/24H
1 PATCH, EXTENDED RELEASE TRANSDERMAL
Refills: 0 | DISCHARGE

## 2025-03-14 RX ORDER — MAGNESIUM CITRATE
296 SOLUTION, ORAL ORAL ONCE
Refills: 0 | Status: COMPLETED | OUTPATIENT
Start: 2025-03-14 | End: 2025-03-14

## 2025-03-14 RX ADMIN — QUETIAPINE FUMARATE 25 MILLIGRAM(S): 25 TABLET ORAL at 12:43

## 2025-03-14 RX ADMIN — LISINOPRIL 10 MILLIGRAM(S): 5 TABLET ORAL at 05:14

## 2025-03-14 RX ADMIN — DILTIAZEM HYDROCHLORIDE 120 MILLIGRAM(S): 240 TABLET, EXTENDED RELEASE ORAL at 05:13

## 2025-03-14 RX ADMIN — Medication 81 MILLIGRAM(S): at 12:43

## 2025-03-14 RX ADMIN — Medication 40 MILLIGRAM(S): at 05:13

## 2025-03-14 RX ADMIN — Medication 296 MILLILITER(S): at 10:05

## 2025-03-14 RX ADMIN — FOLIC ACID 1 MILLIGRAM(S): 1 TABLET ORAL at 12:43

## 2025-03-14 RX ADMIN — ISOSORBIDE MONONITRATE 30 MILLIGRAM(S): 60 TABLET, EXTENDED RELEASE ORAL at 12:43

## 2025-03-14 RX ADMIN — Medication 60 MILLILITER(S): at 05:39

## 2025-03-14 RX ADMIN — Medication 20 MILLIGRAM(S): at 12:44

## 2025-03-14 RX ADMIN — HEPARIN SODIUM 5000 UNIT(S): 1000 INJECTION INTRAVENOUS; SUBCUTANEOUS at 05:13

## 2025-03-14 RX ADMIN — Medication 100 MILLIGRAM(S): at 12:42

## 2025-03-14 NOTE — DISCHARGE NOTE NURSING/CASE MANAGEMENT/SOCIAL WORK - PATIENT PORTAL LINK FT
You can access the FollowMyHealth Patient Portal offered by [facility] by registering at the following website: [email or web address]. By joining [employer]’s FollowMyHealth portal, you will also be able to view your health information using other applications (apps) compatible with our system.

## 2025-03-14 NOTE — DISCHARGE NOTE NURSING/CASE MANAGEMENT/SOCIAL WORK - NSDCPEEMAIL_GEN_ALL_CORE
[facility] email [email or web address]

## 2025-03-14 NOTE — CONSULT NOTE ADULT - PROBLEM SELECTOR RECOMMENDATION 9
After evaluation at this time protocol .....Pt should be on Thiamine and Folic acid. Pt will be monitored and supportive care provided.  Obtain UDS if not done already.  Use of Vistaril for anxiety.  Consider adding gabapentin for anxiety standing order and follow up with PMD/Program for continuation of treatment.    Abdominal ultrasound AFP every 6 months for HCC screening  -EGD outpatient for esophageal varices screening   -Follow up with Private GI or our [facility] located at [address] [place] [address]. Phone Number: [phone]  -Alcohol counseling provided   CATCH team involved for aftercare and pt will follow up with aftercare

## 2025-03-14 NOTE — DISCHARGE NOTE PROVIDER - NSDCMRMEDTOKEN_GEN_ALL_CORE_FT
Aspir 81 oral delayed release tablet: 1 tab(s) orally once a day  DilTIAZem (Eqv-Cardizem CD) 120 mg/24 hours oral capsule, extended release: 1 cap(s) orally once a day  famotidine 20 mg oral tablet: 1 orally once a day  Flomax 0.4 mg oral capsule: 1 cap(s) orally once a day  isosorbide mononitrate 30 mg oral tablet, extended release: 1 orally once a day  latanoprost 0.005% ophthalmic solution: 1 drop(s) in each eye once a day (at bedtime)  lisinopril 10 mg oral tablet: 1 tab(s) orally once a day  pantoprazole 40 mg oral delayed release tablet: 1 tab(s) orally once a day (at bedtime)  propranolol 10 mg oral tablet: 1 tab(s) orally 2 times a day  QUEtiapine 25 mg oral tablet: 1 tab(s) orally  rivastigmine 6 mg oral capsule: 1 cap(s) orally once a day (in the morning)  rosuvastatin 20 mg oral tablet: 1 tab(s) orally once a day  Trelegy Ellipta 200 mcg-62.5 mcg-25 mcg/inh inhalation powder: 1 spray(s) inhaled once a day   Aspir 81 oral delayed release tablet: 1 tab(s) orally once a day  DilTIAZem (Eqv-Cardizem CD) 120 mg/24 hours oral capsule, extended release: 1 cap(s) orally once a day  famotidine 20 mg oral tablet: 1 orally once a day  Flomax 0.4 mg oral capsule: 1 cap(s) orally once a day  folic acid 1 mg oral tablet: 1 tab(s) orally once a day  isosorbide mononitrate 30 mg oral tablet, extended release: 1 orally once a day  latanoprost 0.005% ophthalmic solution: 1 drop(s) in each eye once a day (at bedtime)  lisinopril 10 mg oral tablet: 1 tab(s) orally once a day  pantoprazole 40 mg oral delayed release tablet: 1 tab(s) orally once a day (at bedtime)  propranolol 10 mg oral tablet: 1 tab(s) orally 2 times a day  QUEtiapine 25 mg oral tablet: 1 tab(s) orally  rosuvastatin 20 mg oral tablet: 1 tab(s) orally once a day  thiamine 100 mg oral tablet: 1 tab(s) orally once a day

## 2025-03-14 NOTE — PHYSICAL THERAPY INITIAL EVALUATION ADULT - GENERAL OBSERVATIONS, REHAB EVAL
9:00 -9;30 Chart reviewed. Order received.  Patient is ok to be  seen for PT,confirmed with RN. pt encountered  Semi frank  in bed denies pain, and agrees to participate in session, +  Rodriguez NAD.

## 2025-03-14 NOTE — DISCHARGE NOTE PROVIDER - NSDCCPCAREPLAN_GEN_ALL_CORE_FT
PRINCIPAL DISCHARGE DIAGNOSIS  Diagnosis: Confusion  Assessment and Plan of Treatment:   You presented to the hospital with a change in mental status/ confusion. A urinalysis, chest xray, respiratory virus panel, and CT of your abdomen and pelvis were done. Folate and vitamin B12 levels were also tested. All results were normal. Upon admission, you were seen by our physical therapy team who recommended home PT. No further workup was needed, you were cleared for discharge.        SECONDARY DISCHARGE DIAGNOSES  Diagnosis: THOMPSON (acute kidney injury)  Assessment and Plan of Treatment: When your original labs were drawn at the hospital, they showed an acute kidney inury. This was thought to be secondary to dehydration. You were given gentle IV fluids and repeat lab worked showed that the acute kidney injury was improving      Diagnosis: Alcohol abuse  Assessment and Plan of Treatment: Due to your history of alcohol use, your alcohol levels were checked upon arrival which were normal. You were seen by the addiction medicine team who recommended thiamine and folic acid supplements to minimize withdrawal complications while you were here. They also  recommeded an esaphagogastroduodenoscopy (imaging study) to monitor for blood vessels in your esophagus that could be present due to alcohol use. They also recommend abdominal ultrasounds every 6 months and for you to visit an outpatient gastroenterologist or liver clinic for follow up.       PRINCIPAL DISCHARGE DIAGNOSIS  Diagnosis: Confusion  Assessment and Plan of Treatment:   You presented to the hospital with a change in mental status/ confusion. A urinalysis, chest xray, respiratory virus panel, and CT of your abdomen and pelvis were done. Folate and vitamin B12 levels were also tested. All results were normal. Upon admission, you were seen by our physical therapy team who recommended home PT. No further workup was needed, you were cleared for discharge.        SECONDARY DISCHARGE DIAGNOSES  Diagnosis: THOMPSON (acute kidney injury)  Assessment and Plan of Treatment: When your original labs were drawn at the hospital, they showed an acute kidney inury. This was thought to be secondary to dehydration. You were given gentle IV fluids and repeat lab worked showed that the acute kidney injury was improving      Diagnosis: Alcohol abuse  Assessment and Plan of Treatment: Due to your history of alcohol use, your alcohol levels were checked upon arrival which were normal. You were seen by the addiction medicine team who recommended thiamine and folic acid supplements to minimize withdrawal complications while you were here. They also  recommeded an esaphagogastroduodenoscopy (imaging study) to monitor for blood vessels in your esophagus that could be present due to alcohol use. They also recommend abdominal ultrasounds every 6 months and for you to visit an outpatient gastroenterologist or liver clinic for follow up.      Diagnosis: Constipation  Assessment and Plan of Treatment: abdominal xray showed - Abundant fecal load/impaction. No abnormal bowel dilatation or pneumoperitoneum. Lumbar spine degenerative changes. Right hip total arthroplasty. Urinary bladder contrast material  -you were started on stool softners , take as prescribed        PRINCIPAL DISCHARGE DIAGNOSIS  Diagnosis: Confusion  Assessment and Plan of Treatment: You presented to the hospital with a change in mental status/ confusion. A urinalysis, chest xray, respiratory virus panel, and CT of your abdomen and pelvis were done. Folate and vitamin B12 levels were also tested. All results were normal. TSH 1/ T4 6.8   Upon admission, you were seen by our physical therapy team who recommended home PT. No further workup was needed, you were cleared for discharge.        SECONDARY DISCHARGE DIAGNOSES  Diagnosis: THOMPSON (acute kidney injury)  Assessment and Plan of Treatment: When your original labs were drawn at the hospital, they showed an acute kidney inury. This was thought to be secondary to dehydration. You were given gentle IV fluids and repeat lab worked showed that the acute kidney injury was improving      Diagnosis: Alcohol abuse  Assessment and Plan of Treatment: Due to your history of alcohol use, your alcohol levels were checked upon arrival which were normal. You were seen by the addiction medicine team who recommended thiamine and folic acid supplements to minimize withdrawal complications while you were here. They also  recommeded an esaphagogastroduodenoscopy (imaging study) to monitor for blood vessels in your esophagus that could be present due to alcohol use. They also recommend abdominal ultrasounds every 6 months and for you to visit an outpatient gastroenterologist or liver clinic for follow up.      Diagnosis: Constipation  Assessment and Plan of Treatment: abdominal xray showed - Abundant fecal load/impaction. No abnormal bowel dilatation or pneumoperitoneum. Lumbar spine degenerative changes. Right hip total arthroplasty. Urinary bladder contrast material  -you were started on stool softners , take as prescribed        PRINCIPAL DISCHARGE DIAGNOSIS  Diagnosis: Confusion  Assessment and Plan of Treatment: You presented to the hospital with a change in mental status/ confusion episode at home secondary to dementia. A urinalysis, chest xray, respiratory virus panel, and CT of your abdomen and pelvis were done. Folate and vitamin B12 levels were also tested. All results were normal. TSH 1/ T4 6.8   Upon admission, you were seen by our physical therapy team who recommended home PT. No further workup was needed, you were cleared for discharge. Urine culture and HIV testing pending. You prfered to take patient home. Records can be picked up in medical records. Continue with home Seroquel and follow up with your scheduled neurologist appointment on Friday 3/21         SECONDARY DISCHARGE DIAGNOSES  Diagnosis: THOMPSON (acute kidney injury)  Assessment and Plan of Treatment: When your original labs were drawn at the hospital, they showed an acute kidney inury. This was thought to be secondary to dehydration. You were given gentle IV fluids and repeat lab worked showed that the acute kidney injury was improving      Diagnosis: Alcohol abuse  Assessment and Plan of Treatment: Due to your history of alcohol use, your alcohol levels were checked upon arrival which were normal. You were seen by the addiction medicine team who recommended thiamine and folic acid supplements to minimize withdrawal complications while you were here. They also  recommeded an esaphagogastroduodenoscopy (imaging study) to monitor for blood vessels in your esophagus that could be present due to alcohol use. They also recommend abdominal ultrasounds every 6 months and for you to visit an outpatient gastroenterologist or liver clinic for follow up.      Diagnosis: Constipation  Assessment and Plan of Treatment: abdominal xray showed - Abundant fecal load/impaction. No abnormal bowel dilatation or pneumoperitoneum. Abdominal exam unremarkable. Lumbar spine degenerative changes. Right hip total arthroplasty. Urinary bladder contrast material  - you received magnesium citrate  - take over the counter and dulcolax or senna and miralax to normalize bowel movements  - family prefered to take patient home

## 2025-03-14 NOTE — PHYSICAL THERAPY INITIAL EVALUATION ADULT - ADDITIONAL COMMENTS
As per  Wife  pt lives With Her in Ph W/ 1 Stoop to enter and ll in one level inside the house , as epr Wife Pt was independent W/ all functional activity PTA and use Rollator at baseline

## 2025-03-14 NOTE — DISCHARGE NOTE NURSING/CASE MANAGEMENT/SOCIAL WORK - FINANCIAL ASSISTANCE
[facility] provides services at a reduced cost to those who are determined to be eligible through [facility]’s financial assistance program. Information regarding [facility]’s financial assistance program can be found by going to [email or web address] or by calling [phone].

## 2025-03-14 NOTE — DISCHARGE NOTE PROVIDER - PROVIDER TOKENS
FREE:[LAST:[[facility]],PHONE:[phone]],FAX:[(   )    -],ADDRESS:[[address] [place] [address]],FOLLOWUP:[1 week]] FREE:[LAST:[[facility]],PHONE:[phone]],FAX:[(   )    -],ADDRESS:[[address] [place] [address]],FOLLOWUP:[1 week]],PROVIDER:[TOKEN:[[id number]],FOLLOWUP:[1 week]] PROVIDER:[TOKEN:[[id number]],FOLLOWUP:[1 week]],FREE:[LAST:[[facility]],PHONE:[phone]],FAX:[(   )    -],ADDRESS:[[address] [place] [address]],FOLLOWUP:[1 week]],FREE:[LAST:[neurology],FIRST:[neurology],PHONE:[(   )    -],FAX:[(   )    -],ADDRESS:[March 21]]

## 2025-03-14 NOTE — DISCHARGE NOTE PROVIDER - HOSPITAL COURSE
89-year-old male history of HTN, HLD, dementia, GERD, asthma, chronic diarrhea, BPH presented to ED with worsening confusion.    # Metabolic encephalopathy  # H/O Dementia   - admitted to medicine  - UA negative/ CXR neg/ RVP negative/ CT C/A/P with no acute findings  - folate, b12 WNL  - PT eval- recommended home PT  - monitor I/Os    # Mild THOMPSON  - likely 2/2 dehydration  - patient given gentle IV fluids and repeat BMP showed resolving THOMPSON    # Hx ETOH use  - check ETOH level- <10  - thiamine and folic acid supplement  - addiction eval- recommended serial abdominal ultrasounds, EGD screening for esophageal varices outpatient and follow up outpatient with GI/ Liver Clinic  - rishi prn     # H/O Chronic diarrhea   - had multiple work up in the past which were negative   - stool count monitored inpatient     89-year-old male history of HTN, HLD, dementia, GERD, asthma, chronic diarrhea, BPH presented to ED with worsening confusion.    # Metabolic encephalopathy  # H/O Dementia   - admitted to medicine  - UA negative/ CXR neg/ RVP negative/ CT C/A/P with no acute findings  - folate, b12 WNL  - PT eval- recommended home PT  - monitor I/Os    # Mild THOMPSON  - likely 2/2 dehydration  - patient given gentle IV fluids and repeat BMP showed resolving THOMPSON    # Hx ETOH use  - check ETOH level- <10  - thiamine and folic acid supplement  - addiction eval- recommended serial abdominal ultrasounds, EGD screening for esophageal varices outpatient and follow up outpatient with GI/ Liver Clinic  - rishi prn     #constipation   KUB-  Abundant fecal load/impaction. No abnormal bowel dilatation   or pneumoperitoneum. Lumbar spine degenerative changes. Right hip total   arthroplasty. Urinary bladder contrast material  -pt received stool softeners.       # H/O Chronic diarrhea   - had multiple work up in the past which were negative   - stool count monitored inpatient     89-year-old male history of HTN, HLD, dementia, GERD, asthma, chronic diarrhea, BPH presented to ED with worsening confusion.    # Sundowning vs Worsening Dementia   # H/O Dementia   - UA negative/ CXR neg/ RVP negative/ CT C/A/P with no acute findings  - folate, b12 WNL  - PT eval- recommended home PT  - monitor I/Os    # Mild THOMPSON - resolved   - likely 2/2 dehydration  - patient given gentle IV fluids and repeat BMP showed resolving THOMPSON    # Hx ETOH use  - check ETOH level- <10  - thiamine and folic acid supplement  - addiction eval- recommended serial abdominal ultrasounds, EGD screening for esophageal varices outpatient and follow up outpatient with GI/ Liver Clinic if consistent with goc as outpatient   - ciwa prn     #constipation   - patient asymptomatic - abd exam benign , no nausea, no abd pain , no tenderness   KUB-  Abundant fecal load/impaction. No abnormal bowel dilatation   or pneumoperitoneum. Lumbar spine degenerative changes. Right hip total   arthroplasty. Urinary bladder contrast material  -pt received stool softeners - wife prefers to take patient home to have bowel movement   - patient had one normal bm in the hospital prior to leaving - no diarrhea       # H/O Chronic diarrhea   - had multiple work up in the past which were negative   - stool count monitored inpatient      attending attestation:  patient seen and examined on day of discharge  labs and vitals reviewed  patient has no complaints  plan of care discussed with patient/family wife  in agreement and understanding     89-year-old male history of HTN, HLD, dementia, GERD, asthma, chronic diarrhea, BPH presented to ED with worsening confusion.    # Sundowning vs Worsening Dementia   # H/O Dementia   - UA negative/ CXR neg/ RVP negative/ CT C/A/P with no acute findings  - folate, b12 WNL  - PT eval- recommended home PT  - monitor I/Os    # Mild THOMPSON - resolved   - likely 2/2 dehydration  - patient given gentle IV fluids and repeat BMP showed resolving THOMPSON    # Hx ETOH use  - check ETOH level- <10  - thiamine and folic acid supplement  - addiction eval- recommended serial abdominal ultrasounds, EGD screening for esophageal varices outpatient and follow up outpatient with GI/ Liver Clinic if consistent with goc as outpatient   - ciwa prn     #constipation   - patient asymptomatic - abd exam benign , no nausea, no abd pain , no tenderness   KUB-  Abundant fecal load/impaction. No abnormal bowel dilatation   or pneumoperitoneum. Lumbar spine degenerative changes. Right hip total   arthroplasty. Urinary bladder contrast material  -pt received stool softeners - wife prefers to take patient home to have bowel movement   - patient had one normal bm in the hospital prior to leaving - no diarrhea       # H/O Chronic diarrhea   - had multiple work up in the past which were negative   - stool count monitored inpatient      attending attestation:  patient seen and examined on day of discharge  labs and vitals reviewed  patient has no complaints  plan of care discussed with patient/family wife  in agreement and understanding 3/14     89-year-old male history of HTN, HLD, dementia, GERD, asthma, chronic diarrhea, BPH presented to ED with worsening confusion.    # Sundowning vs Worsening Dementia   # H/O Dementia   - UA negative/ CXR neg/ RVP negative/ CT C/A/P with no acute findings  - folate, b12 WNL  - PT eval- recommended home PT  - monitor I/Os    # Mild THOMPSON - resolved   - likely 2/2 dehydration  - patient given gentle IV fluids and repeat BMP showed resolving THOMPSON    # Hx ETOH use  - check ETOH level- <10  - thiamine and folic acid supplement  - addiction eval- recommended serial abdominal ultrasounds, EGD screening for esophageal varices outpatient and follow up outpatient with GI/ Liver Clinic if consistent with goc as outpatient   - ciwa prn     #constipation   - patient asymptomatic - abd exam benign , no nausea, no abd pain , no tenderness   KUB-  Abundant fecal load/impaction. No abnormal bowel dilatation   or pneumoperitoneum. Lumbar spine degenerative changes. Right hip total   arthroplasty. Urinary bladder contrast material  -pt received stool softeners - wife prefers to take patient home to have bowel movement   - patient had one normal bm in the hospital prior to leaving - no diarrhea       # H/O Chronic diarrhea   - had multiple work up in the past which were negative   - stool count monitored inpatient      attending attestation:  patient seen and examined on day of discharge  labs and vitals reviewed  patient has no complaints  plan of care discussed with patient/family wife  in agreement and understanding 3/14 ; wife accepts and understands still pending urine clx and blood work as well as resolution of constipation - however prefers to take the patient home and  medical records and monitor for stool and bowel movement at home

## 2025-03-14 NOTE — DISCHARGE NOTE PROVIDER - CARE PROVIDER_API CALL
[facility],   [address] [place] [address]  Phone: [phone]  Fax: (   )    -  Follow Up Time: 1 week    [facility],   [address] [place] [address]  Phone: [phone]  Fax: (   )    -  Follow Up Time: 1 week    Juarez Girard.  Internal Medicine  [address] [place] [address]  Phone: [phone]  Fax: [phone]  Follow Up Time: 1 week   Juarez Girard .  Internal Medicine  [address] [place] [address]  Phone: [phone]  Fax: [phone]  Follow Up Time: 1 week    [facility],   [address] [place] [address]  Phone: [phone]  Fax: (   )    -  Follow Up Time: 1 week    neurology, neurology  March 21  Phone: (   )    -  Fax: (   )    -  Follow Up Time:

## 2025-03-14 NOTE — DISCHARGE NOTE PROVIDER - CARE PROVIDERS DIRECT ADDRESSES
,DirectAddress_Unknown ,DirectAddress_Unknown,[email or web address] ,[email or web address],DirectAddress_Unknown,DirectAddress_Unknown

## 2025-03-14 NOTE — DISCHARGE NOTE PROVIDER - NSDCFUSCHEDAPPT_GEN_ALL_CORE_FT
[facility]  CARDIOLOGY [address]  Scheduled Appointment: 03/18/2025    Parish Cristina  [facility]  CARDIOLOGY [address]  Scheduled Appointment: 04/28/2025

## 2025-03-14 NOTE — DISCHARGE NOTE PROVIDER - ATTENDING DISCHARGE PHYSICAL EXAMINATION:
PHYSICAL EXAM:  GENERAL: NAD, lying in bed comfortably  HEAD:  Atraumatic, Normocephalic  EYES: EOMI, PERRLA, conjunctiva and sclera clear  ENT: Moist mucous membranes  NECK: Supple, No JVD  CHEST/LUNG: bl breath sounds   HEART: Regular rate and rhythm; No murmurs, rubs, or gallops  ABDOMEN: Bowel sounds present; Soft, Nontender, Nondistended. No hepatomegally  EXTREMITIES:  warm   NERVOUS SYSTEM:  Alert & Oriented X2, speech clear. No deficits

## 2025-03-14 NOTE — CONSULT NOTE ADULT - SUBJECTIVE AND OBJECTIVE BOX
89-year-old male history of HTN, HLD, dementia, GERD, asthma, chronic diarrhea, BPH presents to ED with worsening confusion since last evening. Wife denies pt reporting any ever, chills, nausea, vomiting, urinary frequency, urgency, dysuria.  She does admit pt drinking 1-2 glasses/day of scotch for over 20 years.        Pt interviewed, examined and EMR chart reviewed.  Pt admits to drinking--- x    months. Last Drink   Hx of withdrawal   variable periods of sobriety in the past.  Has been in detox before _____yes,   _____No    SOCIAL HISTORY:    REVIEW OF SYSTEMS:    Constitutional: No fever, weight loss or fatigue  ENT:  No difficulty hearing, tinnitus, vertigo; No sinus or throat pain  Neck: No pain or stiffness  Respiratory: No cough, wheezing, chills or hemoptysis  Cardiovascular: No chest pain, palpitations, shortness of breath, dizziness or leg swelling  Gastrointestinal: No abdominal or epigastric pain. No nausea, vomiting or hematemesis; No diarrhea or constipation. No melena or hematochezia.  Neurological: No headaches, memory loss, loss of strength, numbness or tremors  Musculoskeletal: No joint pain or swelling; No muscle, back or extremity pain  Psychiatric: No depression, anxiety, mood swings or difficulty sleeping    MEDICATIONS  (STANDING):  aspirin enteric coated 81 milliGRAM(s) Oral daily  diltiazem    milliGRAM(s) Oral daily  famotidine    Tablet 20 milliGRAM(s) Oral daily  fluticasone propionate/ salmeterol 250-50 MICROgram(s) Diskus 1 Dose(s) Inhalation two times a day  folic acid 1 milliGRAM(s) Oral daily  heparin   Injectable 5000 Unit(s) SubCutaneous every 12 hours  isosorbide   mononitrate ER Tablet (IMDUR) 30 milliGRAM(s) Oral daily  latanoprost 0.005% Ophthalmic Solution 1 Drop(s) Both EYES at bedtime  lisinopril 10 milliGRAM(s) Oral daily  nicotine -  14 mG/24Hr(s) Patch 1 Patch Transdermal daily  pantoprazole    Tablet 40 milliGRAM(s) Oral before breakfast  propranolol 10 milliGRAM(s) Oral two times a day  QUEtiapine 25 milliGRAM(s) Oral daily  rosuvastatin 20 milliGRAM(s) Oral at bedtime  sodium chloride 0.9%. 1000 milliLiter(s) (60 mL/Hr) IV Continuous <Continuous>  tamsulosin 0.4 milliGRAM(s) Oral at bedtime  thiamine 100 milliGRAM(s) Oral daily  thiamine IVPB 500 milliGRAM(s) IV Intermittent once    MEDICATIONS  (PRN):  acetaminophen     Tablet .. 650 milliGRAM(s) Oral every 6 hours PRN Temp greater or equal to 38C (100.4F), Mild Pain (1 - 3)  LORazepam     Tablet 2 milliGRAM(s) Oral every 2 hours PRN CIWA-Ar score  8 - 15      Vital Signs Last 24 Hrs  T(C): 36.7 (14 Mar 2025 05:17), Max: 36.8 (13 Mar 2025 20:41)  T(F): 98 (14 Mar 2025 05:17), Max: 98.2 (13 Mar 2025 20:41)  HR: 77 (14 Mar 2025 05:17) (68 - 77)  BP: 135/66 (14 Mar 2025 05:17) (104/63 - 135/66)  BP(mean): --  RR: 18 (14 Mar 2025 05:17) (16 - 18)  SpO2: 99% (14 Mar 2025 05:17) (96% - 99%)    Parameters below as of 14 Mar 2025 05:17  Patient On (Oxygen Delivery Method): room air        PHYSICAL EXAM:    Constitutional: NAD, well-groomed, well-developed  HEENT: PERRLA, EOMI, Normal Hearing,  Neck: No LAD, No JVD  Back: Normal spine flexure, No CVA tenderness  Respiratory: CTAB/L  Cardiovascular: S1 and S2, RRR, no M/G/R  Gastrointestinal: BS+, soft, NT/ND  Extremities: No peripheral edema  Neurological: A/O x 3, no focal deficits    LABS:                        13.9   9.94  )-----------( 192      ( 13 Mar 2025 11:40 )             41.8     03-13    139  |  105  |  29[H]  ----------------------------<  105[H]  4.2   |  26  |  1.2    Ca    9.1      13 Mar 2025 11:40    TPro  5.8[L]  /  Alb  3.9  /  TBili  0.3  /  DBili  x   /  AST  19  /  ALT  20  /  AlkPhos  90  -      Urinalysis Basic - ( 13 Mar 2025 12:36 )    Color: Dark Yellow / Appearance: Clear / S.026 / pH: x  Gluc: x / Ketone: Trace mg/dL  / Bili: Negative / Urobili: 1.0 mg/dL   Blood: x / Protein: Trace mg/dL / Nitrite: Negative   Leuk Esterase: Negative / RBC: x / WBC x   Sq Epi: x / Non Sq Epi: x / Bacteria: x      Drug Screen Urine:  Alcohol Level  Alcohol, Blood: <10 mg/dL (25 @ 19:00)        RADIOLOGY & ADDITIONAL STUDIES:

## 2025-03-14 NOTE — DISCHARGE NOTE NURSING/CASE MANAGEMENT/SOCIAL WORK - NSDCPEWEB_GEN_ALL_CORE
[facility] for Tobacco Control website --- [email or web address] website --- [email or web address]

## 2025-03-15 LAB — HIV 1+2 AB+HIV1 P24 AG SERPL QL IA: SIGNIFICANT CHANGE UP

## 2025-03-15 RX ORDER — CEFPODOXIME PROXETIL 200 MG/1
1 TABLET, FILM COATED ORAL
Qty: 10 | Refills: 0
Start: 2025-03-15 | End: 2025-03-19

## 2025-03-15 NOTE — CHART NOTE - NSCHARTNOTEFT_GEN_A_CORE
Culture - Urine (03.13.25 @ 14:00)    Specimen Source: Clean Catch Clean Catch (Midstream)    Culture Results:   10,000 - 49,000 CFU/mL Klebsiella pneumoniae  10,000 - 49,000 CFU/mL Escherichia coli  <10,000 CFU/ml Normal Urogenital fan present      Called wife Ines , updated about the result, abx senrt to pharmacy - wife to pick it up  encouraged to make appointment with pcp for follow up

## 2025-03-16 LAB
-  AMOXICILLIN/CLAVULANIC ACID: SIGNIFICANT CHANGE UP
-  AMOXICILLIN/CLAVULANIC ACID: SIGNIFICANT CHANGE UP
-  AMPICILLIN/SULBACTAM: SIGNIFICANT CHANGE UP
-  AMPICILLIN/SULBACTAM: SIGNIFICANT CHANGE UP
-  AMPICILLIN: SIGNIFICANT CHANGE UP
-  AMPICILLIN: SIGNIFICANT CHANGE UP
-  AZTREONAM: SIGNIFICANT CHANGE UP
-  AZTREONAM: SIGNIFICANT CHANGE UP
-  CEFAZOLIN: SIGNIFICANT CHANGE UP
-  CEFAZOLIN: SIGNIFICANT CHANGE UP
-  CEFEPIME: SIGNIFICANT CHANGE UP
-  CEFEPIME: SIGNIFICANT CHANGE UP
-  CEFOXITIN: SIGNIFICANT CHANGE UP
-  CEFOXITIN: SIGNIFICANT CHANGE UP
-  CEFTRIAXONE: SIGNIFICANT CHANGE UP
-  CEFTRIAXONE: SIGNIFICANT CHANGE UP
-  CEFUROXIME: SIGNIFICANT CHANGE UP
-  CEFUROXIME: SIGNIFICANT CHANGE UP
-  CIPROFLOXACIN: SIGNIFICANT CHANGE UP
-  CIPROFLOXACIN: SIGNIFICANT CHANGE UP
-  ERTAPENEM: SIGNIFICANT CHANGE UP
-  ERTAPENEM: SIGNIFICANT CHANGE UP
-  GENTAMICIN: SIGNIFICANT CHANGE UP
-  GENTAMICIN: SIGNIFICANT CHANGE UP
-  IMIPENEM: SIGNIFICANT CHANGE UP
-  IMIPENEM: SIGNIFICANT CHANGE UP
-  LEVOFLOXACIN: SIGNIFICANT CHANGE UP
-  LEVOFLOXACIN: SIGNIFICANT CHANGE UP
-  MEROPENEM: SIGNIFICANT CHANGE UP
-  MEROPENEM: SIGNIFICANT CHANGE UP
-  NITROFURANTOIN: SIGNIFICANT CHANGE UP
-  NITROFURANTOIN: SIGNIFICANT CHANGE UP
-  PIPERACILLIN/TAZOBACTAM: SIGNIFICANT CHANGE UP
-  PIPERACILLIN/TAZOBACTAM: SIGNIFICANT CHANGE UP
-  TOBRAMYCIN: SIGNIFICANT CHANGE UP
-  TOBRAMYCIN: SIGNIFICANT CHANGE UP
-  TRIMETHOPRIM/SULFAMETHOXAZOLE: SIGNIFICANT CHANGE UP
-  TRIMETHOPRIM/SULFAMETHOXAZOLE: SIGNIFICANT CHANGE UP
CULTURE RESULTS: ABNORMAL
METHOD TYPE: SIGNIFICANT CHANGE UP
METHOD TYPE: SIGNIFICANT CHANGE UP
ORGANISM # SPEC MICROSCOPIC CNT: ABNORMAL
ORGANISM # SPEC MICROSCOPIC CNT: ABNORMAL
ORGANISM # SPEC MICROSCOPIC CNT: SIGNIFICANT CHANGE UP
SPECIMEN SOURCE: SIGNIFICANT CHANGE UP

## 2025-03-19 DIAGNOSIS — F10.10 ALCOHOL ABUSE, UNCOMPLICATED: ICD-10-CM

## 2025-03-19 DIAGNOSIS — K21.9 GASTRO-ESOPHAGEAL REFLUX DISEASE WITHOUT ESOPHAGITIS: ICD-10-CM

## 2025-03-19 DIAGNOSIS — F05 DELIRIUM DUE TO KNOWN PHYSIOLOGICAL CONDITION: ICD-10-CM

## 2025-03-19 DIAGNOSIS — I10 ESSENTIAL (PRIMARY) HYPERTENSION: ICD-10-CM

## 2025-03-19 DIAGNOSIS — N17.9 ACUTE KIDNEY FAILURE, UNSPECIFIED: ICD-10-CM

## 2025-03-19 DIAGNOSIS — K52.9 NONINFECTIVE GASTROENTERITIS AND COLITIS, UNSPECIFIED: ICD-10-CM

## 2025-03-19 DIAGNOSIS — M54.9 DORSALGIA, UNSPECIFIED: ICD-10-CM

## 2025-03-19 DIAGNOSIS — Z79.899 OTHER LONG TERM (CURRENT) DRUG THERAPY: ICD-10-CM

## 2025-03-19 DIAGNOSIS — N40.0 BENIGN PROSTATIC HYPERPLASIA WITHOUT LOWER URINARY TRACT SYMPTOMS: ICD-10-CM

## 2025-03-19 DIAGNOSIS — K59.00 CONSTIPATION, UNSPECIFIED: ICD-10-CM

## 2025-03-19 DIAGNOSIS — F03.911 UNSPECIFIED DEMENTIA, UNSPECIFIED SEVERITY, WITH AGITATION: ICD-10-CM

## 2025-03-19 DIAGNOSIS — F17.210 NICOTINE DEPENDENCE, CIGARETTES, UNCOMPLICATED: ICD-10-CM

## 2025-03-19 DIAGNOSIS — J45.909 UNSPECIFIED ASTHMA, UNCOMPLICATED: ICD-10-CM
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
## 2025-03-19 DIAGNOSIS — Z79.82 LONG TERM (CURRENT) USE OF ASPIRIN: ICD-10-CM

## 2025-03-19 DIAGNOSIS — G89.29 OTHER CHRONIC PAIN: ICD-10-CM

## 2025-03-19 DIAGNOSIS — E86.0 DEHYDRATION: ICD-10-CM

## 2025-03-19 DIAGNOSIS — G93.41 METABOLIC ENCEPHALOPATHY: ICD-10-CM

## 2025-03-19 DIAGNOSIS — Z91.83 WANDERING IN DISEASES CLASSIFIED ELSEWHERE: ICD-10-CM

## 2025-04-03 ENCOUNTER — APPOINTMENT (OUTPATIENT)
Dept: NEUROLOGY | Facility: CLINIC | Age: 89
End: 2025-04-03
Payer: MEDICARE

## 2025-04-03 VITALS
DIASTOLIC BLOOD PRESSURE: 72 MMHG | SYSTOLIC BLOOD PRESSURE: 134 MMHG | HEIGHT: 62 IN | BODY MASS INDEX: 28.34 KG/M2 | WEIGHT: 154 LBS | HEART RATE: 83 BPM

## 2025-04-03 DIAGNOSIS — Z86.79 PERSONAL HISTORY OF OTHER DISEASES OF THE CIRCULATORY SYSTEM: ICD-10-CM

## 2025-04-03 DIAGNOSIS — J45.50 SEVERE PERSISTENT ASTHMA, UNCOMPLICATED: ICD-10-CM

## 2025-04-03 DIAGNOSIS — Z86.39 PERSONAL HISTORY OF OTHER ENDOCRINE, NUTRITIONAL AND METABOLIC DISEASE: ICD-10-CM

## 2025-04-03 DIAGNOSIS — Z87.09 PERSONAL HISTORY OF OTHER DISEASES OF THE RESPIRATORY SYSTEM: ICD-10-CM

## 2025-04-03 PROCEDURE — 99204 OFFICE O/P NEW MOD 45 MIN: CPT

## 2025-04-07 ENCOUNTER — APPOINTMENT (OUTPATIENT)
Dept: CARDIOLOGY | Facility: CLINIC | Age: 89
End: 2025-04-07
Payer: MEDICARE

## 2025-04-07 ENCOUNTER — APPOINTMENT (OUTPATIENT)
Dept: GASTROENTEROLOGY | Facility: CLINIC | Age: 89
End: 2025-04-07

## 2025-04-07 DIAGNOSIS — K59.00 CONSTIPATION, UNSPECIFIED: ICD-10-CM

## 2025-04-07 DIAGNOSIS — Z82.0 FAMILY HISTORY OF EPILEPSY AND OTHER DISEASES OF THE NERVOUS SYSTEM: ICD-10-CM

## 2025-04-07 DIAGNOSIS — R19.7 DIARRHEA, UNSPECIFIED: ICD-10-CM

## 2025-04-07 PROCEDURE — 93880 EXTRACRANIAL BILAT STUDY: CPT

## 2025-04-07 PROCEDURE — 99204 OFFICE O/P NEW MOD 45 MIN: CPT | Mod: 93

## 2025-04-09 DIAGNOSIS — F01.B18 VASCULAR DEMENTIA, MODERATE, WITH OTHER BEHAVIORAL DISTURBANCE: ICD-10-CM

## 2025-04-10 ENCOUNTER — APPOINTMENT (OUTPATIENT)
Dept: MRI IMAGING | Facility: CLINIC | Age: 89
End: 2025-04-10
Payer: MEDICARE

## 2025-04-10 PROCEDURE — 70551 MRI BRAIN STEM W/O DYE: CPT | Mod: 26,TC

## 2025-04-28 ENCOUNTER — APPOINTMENT (OUTPATIENT)
Dept: CARDIOLOGY | Facility: CLINIC | Age: 89
End: 2025-04-28
Payer: MEDICARE

## 2025-04-28 VITALS
WEIGHT: 144 LBS | OXYGEN SATURATION: 96 % | DIASTOLIC BLOOD PRESSURE: 74 MMHG | HEIGHT: 62 IN | HEART RATE: 66 BPM | SYSTOLIC BLOOD PRESSURE: 126 MMHG | BODY MASS INDEX: 26.5 KG/M2

## 2025-04-28 DIAGNOSIS — R42 DIZZINESS AND GIDDINESS: ICD-10-CM

## 2025-04-28 DIAGNOSIS — R60.0 LOCALIZED EDEMA: ICD-10-CM

## 2025-04-28 DIAGNOSIS — F01.B18 VASCULAR DEMENTIA, MODERATE, WITH OTHER BEHAVIORAL DISTURBANCE: ICD-10-CM

## 2025-04-28 DIAGNOSIS — I10 ESSENTIAL (PRIMARY) HYPERTENSION: ICD-10-CM

## 2025-04-28 DIAGNOSIS — Z86.39 PERSONAL HISTORY OF OTHER ENDOCRINE, NUTRITIONAL AND METABOLIC DISEASE: ICD-10-CM

## 2025-04-28 DIAGNOSIS — G47.33 OBSTRUCTIVE SLEEP APNEA (ADULT) (PEDIATRIC): ICD-10-CM

## 2025-04-28 DIAGNOSIS — I25.10 ATHEROSCLEROTIC HEART DISEASE OF NATIVE CORONARY ARTERY W/OUT ANGINA PECTORIS: ICD-10-CM

## 2025-04-28 DIAGNOSIS — I35.0 NONRHEUMATIC AORTIC (VALVE) STENOSIS: ICD-10-CM

## 2025-04-28 PROCEDURE — G2211 COMPLEX E/M VISIT ADD ON: CPT

## 2025-04-28 PROCEDURE — 93000 ELECTROCARDIOGRAM COMPLETE: CPT

## 2025-04-28 PROCEDURE — 99214 OFFICE O/P EST MOD 30 MIN: CPT

## 2025-04-28 RX ORDER — LORAZEPAM 0.5 MG/1
0.5 TABLET ORAL
Refills: 0 | Status: ACTIVE | COMMUNITY

## 2025-05-07 ENCOUNTER — APPOINTMENT (OUTPATIENT)
Dept: NEUROLOGY | Facility: CLINIC | Age: 89
End: 2025-05-07
Payer: MEDICARE

## 2025-05-07 PROCEDURE — 99214 OFFICE O/P EST MOD 30 MIN: CPT

## 2025-05-12 ENCOUNTER — APPOINTMENT (OUTPATIENT)
Dept: GASTROENTEROLOGY | Facility: CLINIC | Age: 89
End: 2025-05-12

## 2025-05-12 DIAGNOSIS — K59.00 CONSTIPATION, UNSPECIFIED: ICD-10-CM

## 2025-05-12 DIAGNOSIS — R19.7 DIARRHEA, UNSPECIFIED: ICD-10-CM

## 2025-05-12 DIAGNOSIS — Z86.79 PERSONAL HISTORY OF OTHER DISEASES OF THE CIRCULATORY SYSTEM: ICD-10-CM

## 2025-05-12 DIAGNOSIS — Z87.09 PERSONAL HISTORY OF OTHER DISEASES OF THE RESPIRATORY SYSTEM: ICD-10-CM

## 2025-05-12 DIAGNOSIS — Z86.39 PERSONAL HISTORY OF OTHER ENDOCRINE, NUTRITIONAL AND METABOLIC DISEASE: ICD-10-CM

## 2025-05-12 DIAGNOSIS — J45.50 SEVERE PERSISTENT ASTHMA, UNCOMPLICATED: ICD-10-CM

## 2025-05-12 PROCEDURE — 99213 OFFICE O/P EST LOW 20 MIN: CPT | Mod: 1L,93

## 2025-05-23 ENCOUNTER — APPOINTMENT (OUTPATIENT)
Dept: PULMONOLOGY | Facility: CLINIC | Age: 89
End: 2025-05-23
Payer: MEDICARE

## 2025-05-23 VITALS
OXYGEN SATURATION: 98 % | HEART RATE: 68 BPM | WEIGHT: 155 LBS | BODY MASS INDEX: 28.52 KG/M2 | HEIGHT: 62 IN | SYSTOLIC BLOOD PRESSURE: 120 MMHG | DIASTOLIC BLOOD PRESSURE: 80 MMHG | RESPIRATION RATE: 12 BRPM

## 2025-05-23 DIAGNOSIS — J44.9 CHRONIC OBSTRUCTIVE PULMONARY DISEASE, UNSPECIFIED: ICD-10-CM

## 2025-05-23 DIAGNOSIS — G47.33 OBSTRUCTIVE SLEEP APNEA (ADULT) (PEDIATRIC): ICD-10-CM

## 2025-05-23 DIAGNOSIS — R09.82 POSTNASAL DRIP: ICD-10-CM

## 2025-05-23 PROCEDURE — 99214 OFFICE O/P EST MOD 30 MIN: CPT

## 2025-05-23 PROCEDURE — G2211 COMPLEX E/M VISIT ADD ON: CPT

## 2025-05-23 RX ORDER — AZELASTINE HYDROCHLORIDE 137 UG/1
0.1 SPRAY, METERED NASAL
Qty: 1 | Refills: 3 | Status: ACTIVE | COMMUNITY
Start: 2025-05-23 | End: 1900-01-01

## 2025-05-23 RX ORDER — FLUTICASONE FUROATE, UMECLIDINIUM BROMIDE AND VILANTEROL TRIFENATATE 100; 62.5; 25 UG/1; UG/1; UG/1
100-62.5-25 POWDER RESPIRATORY (INHALATION)
Qty: 1 | Refills: 1 | Status: ACTIVE | COMMUNITY
Start: 2025-05-23 | End: 1900-01-01

## 2025-05-23 RX ORDER — ALBUTEROL SULFATE 90 UG/1
108 (90 BASE) INHALANT RESPIRATORY (INHALATION)
Qty: 1 | Refills: 3 | Status: ACTIVE | COMMUNITY
Start: 2025-05-23 | End: 1900-01-01

## 2025-06-02 ENCOUNTER — LABORATORY RESULT (OUTPATIENT)
Age: 89
End: 2025-06-02

## 2025-06-03 ENCOUNTER — APPOINTMENT (OUTPATIENT)
Dept: PSYCHIATRY | Facility: CLINIC | Age: 89
End: 2025-06-03
Payer: MEDICARE

## 2025-06-03 ENCOUNTER — OUTPATIENT (OUTPATIENT)
Dept: OUTPATIENT SERVICES | Facility: HOSPITAL | Age: 89
LOS: 1 days | End: 2025-06-03
Payer: MEDICARE

## 2025-06-03 DIAGNOSIS — F01.B18 VASCULAR DEMENTIA, MODERATE, WITH OTHER BEHAVIORAL DISTURBANCE: ICD-10-CM

## 2025-06-03 DIAGNOSIS — Z98.890 OTHER SPECIFIED POSTPROCEDURAL STATES: Chronic | ICD-10-CM

## 2025-06-03 DIAGNOSIS — F41.1 GENERALIZED ANXIETY DISORDER: ICD-10-CM

## 2025-06-03 PROCEDURE — 90792 PSYCH DIAG EVAL W/MED SRVCS: CPT

## 2025-06-03 RX ORDER — TRAZODONE HYDROCHLORIDE 50 MG/1
50 TABLET ORAL AS DIRECTED
Qty: 30 | Refills: 0 | Status: ACTIVE | COMMUNITY
Start: 2025-06-03 | End: 1900-01-01

## 2025-06-09 ENCOUNTER — NON-APPOINTMENT (OUTPATIENT)
Age: 89
End: 2025-06-09

## 2025-06-12 ENCOUNTER — APPOINTMENT (OUTPATIENT)
Dept: UROLOGY | Facility: CLINIC | Age: 89
End: 2025-06-12
Payer: MEDICARE

## 2025-06-12 VITALS
HEART RATE: 79 BPM | HEIGHT: 62 IN | OXYGEN SATURATION: 97 % | SYSTOLIC BLOOD PRESSURE: 135 MMHG | WEIGHT: 155 LBS | RESPIRATION RATE: 18 BRPM | DIASTOLIC BLOOD PRESSURE: 66 MMHG | BODY MASS INDEX: 28.52 KG/M2

## 2025-06-12 PROCEDURE — 99214 OFFICE O/P EST MOD 30 MIN: CPT

## 2025-06-13 PROBLEM — N39.0 URINARY TRACT INFECTION: Status: ACTIVE | Noted: 2025-06-13

## 2025-06-25 ENCOUNTER — APPOINTMENT (OUTPATIENT)
Dept: NEUROLOGY | Facility: CLINIC | Age: 89
End: 2025-06-25

## 2025-06-26 ENCOUNTER — APPOINTMENT (OUTPATIENT)
Dept: CV DIAGNOSITCS | Facility: HOSPITAL | Age: 89
End: 2025-06-26
Payer: MEDICARE

## 2025-06-26 ENCOUNTER — RESULT REVIEW (OUTPATIENT)
Age: 89
End: 2025-06-26

## 2025-06-26 ENCOUNTER — OUTPATIENT (OUTPATIENT)
Dept: OUTPATIENT SERVICES | Facility: HOSPITAL | Age: 89
LOS: 1 days | End: 2025-06-26
Payer: MEDICARE

## 2025-06-26 DIAGNOSIS — Z98.890 OTHER SPECIFIED POSTPROCEDURAL STATES: Chronic | ICD-10-CM

## 2025-06-26 DIAGNOSIS — I10 ESSENTIAL (PRIMARY) HYPERTENSION: ICD-10-CM

## 2025-06-26 PROCEDURE — 93306 TTE W/DOPPLER COMPLETE: CPT | Mod: 26

## 2025-06-26 PROCEDURE — 93306 TTE W/DOPPLER COMPLETE: CPT

## 2025-06-27 DIAGNOSIS — I10 ESSENTIAL (PRIMARY) HYPERTENSION: ICD-10-CM

## 2025-07-10 ENCOUNTER — APPOINTMENT (OUTPATIENT)
Dept: UROLOGY | Facility: CLINIC | Age: 89
End: 2025-07-10
Payer: MEDICARE

## 2025-07-10 PROCEDURE — 99214 OFFICE O/P EST MOD 30 MIN: CPT

## 2025-07-10 RX ORDER — TAMSULOSIN HYDROCHLORIDE 0.4 MG/1
0.4 CAPSULE ORAL
Qty: 90 | Refills: 3 | Status: ACTIVE | COMMUNITY
Start: 2025-07-10 | End: 1900-01-01

## 2025-07-29 ENCOUNTER — APPOINTMENT (OUTPATIENT)
Dept: PAIN MANAGEMENT | Facility: CLINIC | Age: 89
End: 2025-07-29
Payer: MEDICARE

## 2025-07-29 VITALS — HEIGHT: 62 IN | BODY MASS INDEX: 28.16 KG/M2 | WEIGHT: 153 LBS

## 2025-07-29 DIAGNOSIS — M54.50 LOW BACK PAIN, UNSPECIFIED: ICD-10-CM

## 2025-07-29 PROCEDURE — 99213 OFFICE O/P EST LOW 20 MIN: CPT

## 2025-08-05 ENCOUNTER — APPOINTMENT (OUTPATIENT)
Dept: CARDIOLOGY | Facility: CLINIC | Age: 89
End: 2025-08-05

## 2025-08-06 ENCOUNTER — APPOINTMENT (OUTPATIENT)
Dept: PSYCHIATRY | Facility: CLINIC | Age: 89
End: 2025-08-06

## 2025-08-06 ENCOUNTER — NON-APPOINTMENT (OUTPATIENT)
Age: 89
End: 2025-08-06

## 2025-08-15 ENCOUNTER — APPOINTMENT (OUTPATIENT)
Dept: PAIN MANAGEMENT | Facility: CLINIC | Age: 89
End: 2025-08-15
Payer: MEDICARE

## 2025-08-15 DIAGNOSIS — M54.16 RADICULOPATHY, LUMBAR REGION: ICD-10-CM

## 2025-08-15 PROCEDURE — 94761 N-INVAS EAR/PLS OXIMETRY MLT: CPT

## 2025-08-15 PROCEDURE — 93040 RHYTHM ECG WITH REPORT: CPT | Mod: 79

## 2025-08-15 PROCEDURE — 62323 NJX INTERLAMINAR LMBR/SAC: CPT

## 2025-08-15 PROCEDURE — 93770 DETERMINATION VENOUS PRESS: CPT

## 2025-08-26 ENCOUNTER — APPOINTMENT (OUTPATIENT)
Dept: PAIN MANAGEMENT | Facility: CLINIC | Age: 89
End: 2025-08-26

## 2025-09-03 ENCOUNTER — APPOINTMENT (OUTPATIENT)
Dept: PSYCHIATRY | Facility: CLINIC | Age: 89
End: 2025-09-03

## 2025-09-16 ENCOUNTER — APPOINTMENT (OUTPATIENT)
Dept: PSYCHIATRY | Facility: CLINIC | Age: 89
End: 2025-09-16
Payer: MEDICARE

## 2025-09-16 DIAGNOSIS — F01.B18 VASCULAR DEMENTIA, MODERATE, WITH OTHER BEHAVIORAL DISTURBANCE: ICD-10-CM

## 2025-09-16 PROCEDURE — 99214 OFFICE O/P EST MOD 30 MIN: CPT
